# Patient Record
Sex: FEMALE | Race: WHITE | NOT HISPANIC OR LATINO | Employment: FULL TIME | URBAN - METROPOLITAN AREA
[De-identification: names, ages, dates, MRNs, and addresses within clinical notes are randomized per-mention and may not be internally consistent; named-entity substitution may affect disease eponyms.]

---

## 2017-01-17 ENCOUNTER — PRENATAL OFFICE VISIT (OUTPATIENT)
Dept: OBGYN | Facility: CLINIC | Age: 32
End: 2017-01-17
Payer: COMMERCIAL

## 2017-01-17 VITALS — WEIGHT: 156.6 LBS | SYSTOLIC BLOOD PRESSURE: 94 MMHG | DIASTOLIC BLOOD PRESSURE: 54 MMHG | BODY MASS INDEX: 26.87 KG/M2

## 2017-01-17 DIAGNOSIS — Z34.02 ENCOUNTER FOR SUPERVISION OF NORMAL FIRST PREGNANCY IN SECOND TRIMESTER: Primary | ICD-10-CM

## 2017-01-17 PROCEDURE — 99207 ZZC PRENATAL VISIT: CPT | Performed by: OBSTETRICS & GYNECOLOGY

## 2017-01-17 NOTE — NURSING NOTE
"Chief Complaint   Patient presents with     Prenatal Care   21w2d  Discuss fetal echo.  Mere Mcallister MA      Initial BP 94/54 mmHg  Wt 156 lb 9.6 oz (71.033 kg)  LMP 08/09/2016 Estimated body mass index is 26.87 kg/(m^2) as calculated from the following:    Height as of 9/28/16: 5' 4\" (1.626 m).    Weight as of this encounter: 156 lb 9.6 oz (71.033 kg).  BP completed using cuff size: regular    "

## 2017-01-17 NOTE — PROGRESS NOTES
PROBLEM LIST  1. Migraines: Inderal  2. Anxiety: Zoloft and Buspar  3. Opos/RI/  GIRL  4. Increased NT on 1TM screen: Verifi 46 XX, level II: within normal limits; echo (1/31):    Feeling well overall, flying to [place] in the next few weeks, discussed walking, hydration. Echo scheduled with peds cardiology/MFM on 1/31, follow up with me in one month.  June Jones MD

## 2017-01-31 ENCOUNTER — HOSPITAL ENCOUNTER (OUTPATIENT)
Dept: ULTRASOUND IMAGING | Facility: CLINIC | Age: 32
Discharge: HOME | End: 2017-01-31
Attending: OBSTETRICS & GYNECOLOGY | Admitting: OBSTETRICS & GYNECOLOGY
Payer: COMMERCIAL

## 2017-01-31 ENCOUNTER — MYC MEDICAL ADVICE (OUTPATIENT)
Dept: OBGYN | Facility: CLINIC | Age: 32
End: 2017-01-31

## 2017-01-31 ENCOUNTER — OFFICE VISIT (OUTPATIENT)
Dept: MATERNAL FETAL MEDICINE | Facility: CLINIC | Age: 32
End: 2017-01-31
Attending: OBSTETRICS & GYNECOLOGY
Payer: COMMERCIAL

## 2017-01-31 ENCOUNTER — HOSPITAL ENCOUNTER (OUTPATIENT)
Dept: ULTRASOUND IMAGING | Facility: CLINIC | Age: 32
End: 2017-01-31
Attending: OBSTETRICS & GYNECOLOGY
Payer: COMMERCIAL

## 2017-01-31 DIAGNOSIS — O28.3 INCREASED NUCHAL TRANSLUCENCY SPACE ON FETAL ULTRASOUND: Primary | ICD-10-CM

## 2017-01-31 DIAGNOSIS — O28.3 INCREASED NUCHAL TRANSLUCENCY SPACE ON FETAL ULTRASOUND: ICD-10-CM

## 2017-01-31 PROCEDURE — 76825 ECHO EXAM OF FETAL HEART: CPT

## 2017-01-31 PROCEDURE — 76816 OB US FOLLOW-UP PER FETUS: CPT

## 2017-01-31 NOTE — PROGRESS NOTES
"Please see \"Imaging\" tab under \"Chart Review\" for details of today's ultrasound.    Karl Hernández M.D.  Specialist in Maternal-Fetal Medicine     "

## 2017-02-21 ENCOUNTER — PRENATAL OFFICE VISIT (OUTPATIENT)
Dept: OBGYN | Facility: CLINIC | Age: 32
End: 2017-02-21
Payer: COMMERCIAL

## 2017-02-21 VITALS — SYSTOLIC BLOOD PRESSURE: 98 MMHG | BODY MASS INDEX: 28.37 KG/M2 | WEIGHT: 165.3 LBS | DIASTOLIC BLOOD PRESSURE: 60 MMHG

## 2017-02-21 DIAGNOSIS — Z32.01 PREGNANCY TEST POSITIVE: Primary | ICD-10-CM

## 2017-02-21 DIAGNOSIS — Z34.02 ENCOUNTER FOR SUPERVISION OF NORMAL FIRST PREGNANCY IN SECOND TRIMESTER: ICD-10-CM

## 2017-02-21 LAB
ERYTHROCYTE [DISTWIDTH] IN BLOOD BY AUTOMATED COUNT: 12.3 % (ref 10–15)
HCT VFR BLD AUTO: 30.7 % (ref 35–47)
HGB BLD-MCNC: 10.1 G/DL (ref 11.7–15.7)
MCH RBC QN AUTO: 31.8 PG (ref 26.5–33)
MCHC RBC AUTO-ENTMCNC: 32.9 G/DL (ref 31.5–36.5)
MCV RBC AUTO: 97 FL (ref 78–100)
PLATELET # BLD AUTO: 174 10E9/L (ref 150–450)
RBC # BLD AUTO: 3.18 10E12/L (ref 3.8–5.2)
WBC # BLD AUTO: 8 10E9/L (ref 4–11)

## 2017-02-21 PROCEDURE — 82950 GLUCOSE TEST: CPT | Performed by: OBSTETRICS & GYNECOLOGY

## 2017-02-21 PROCEDURE — 36415 COLL VENOUS BLD VENIPUNCTURE: CPT | Performed by: OBSTETRICS & GYNECOLOGY

## 2017-02-21 PROCEDURE — 86780 TREPONEMA PALLIDUM: CPT | Performed by: OBSTETRICS & GYNECOLOGY

## 2017-02-21 PROCEDURE — 99207 ZZC PRENATAL VISIT: CPT | Performed by: OBSTETRICS & GYNECOLOGY

## 2017-02-21 PROCEDURE — 85027 COMPLETE CBC AUTOMATED: CPT | Performed by: OBSTETRICS & GYNECOLOGY

## 2017-02-21 NOTE — PROGRESS NOTES
PROBLEM LIST  1. Migraines: Inderal  2. Anxiety: Zoloft and Buspar  3. Opos/RI/  GIRL  4. Increased NT on 1TM screen: Verifi 46 XX, level II: within normal limits; echo (1/31):    All follow up tests normal (echo.) Routine prenatal care. Glucose tolerance test today, tdap next time.  June Jones MD

## 2017-02-21 NOTE — MR AVS SNAPSHOT
After Visit Summary   2/21/2017    Seema Nielson    MRN: [id number]           Patient Information     Date Of Birth          1985        Visit Information        Provider Department      2/21/2017 3:30 PM June Jones MD [facility]        Today's Diagnoses     Pregnancy test positive    -  1    Encounter for supervision of normal first pregnancy in second trimester           Follow-ups after your visit        Your next 10 appointments already scheduled     Mar 14, 2017  3:00 PM CDT   ESTABL[facility] PRENATAL with June Jones MD   [facility]    [address]  [facility] [id number] [phone]              Who to contact     If you have questions or need follow up information about today's clinic visit or your schedule please contact [facility] directly at [phone].  Normal or non-critical lab and imaging results will be communicated to you by MyChart, letter or phone within 4 business days after the clinic has received the results. If you do not hear from us within 7 days, please contact the clinic through MyChart or phone. If you have a critical or abnormal lab result, we will notify you by phone as soon as possible.  Submit refill requests through [employer] or call your pharmacy and they will forward the refill request to us. Please allow 3 business days for your refill to be completed.          Additional Information About Your Visit        MyChart Information     [employer] gives you secure access to your electronic health record. If you see a primary care provider, you can also send messages to your care team and make appointments. If you have questions, please call your primary care clinic.  If you do not have a primary care provider, please call [phone] and they will assist you.        Care EveryWhere ID     This is your Care EveryWhere ID. This could be used by other organizations to  access your [facility] medical records  [id number]        Your Vitals Were     Last Period BMI (Body Mass Index)                08/09/2016 28.37 kg/m2           Blood Pressure from Last 3 Encounters:   02/21/17 98/60   01/17/17 94/54   12/21/16 100/62    Weight from Last 3 Encounters:   02/21/17 165 lb 4.8 oz (75 kg)   01/17/17 156 lb 9.6 oz (71 kg)   12/21/16 152 lb 4.8 oz (69.1 kg)              We Performed the Following     Anti Treponema     CBC with platelets     Glucose tolerance, gest screen, 1 hour          Today's Medication Changes          These changes are accurate as of: 2/21/17  4:30 PM.  If you have any questions, ask your nurse or doctor.               Stop taking these medicines if you haven't already. Please contact your care team if you have questions.     SUMAtriptan 100 MG tablet   Commonly known as:  IMITREX   Stopped by:  June Jones MD                    Primary Care Provider    No Pcp Confirmed       No address on file        Thank you!     Thank you for choosing [facility]  for your care. Our goal is always to provide you with excellent care. Hearing back from our patients is one way we can continue to improve our services. Please take a few minutes to complete the written survey that you may receive in the mail after your visit with us. Thank you!             Your Updated Medication List - Protect others around you: Learn how to safely use, store and throw away your medicines at www.disposemymeds.org.          This list is accurate as of: 2/21/17  4:30 PM.  Always use your most recent med list.                   Brand Name Dispense Instructions for use    busPIRone 15 MG tablet    BUSPAR    360 tablet    Take 2 tablets (30 mg) by mouth 2 times daily       propranolol 60 MG 24 hr capsule    INDERAL LA         sertraline 100 MG tablet    ZOLOFT    90 tablet    Take 1 tablet (100 mg) by mouth daily

## 2017-02-21 NOTE — NURSING NOTE
"Chief Complaint   Patient presents with     Prenatal Care   26w2d  Pt having 1 hour glucose, pt is O+.  No concerns.  Mere Mcallister MA      Initial BP 98/60  Wt 165 lb 4.8 oz (75 kg)  LMP 08/09/2016  BMI 28.37 kg/m2 Estimated body mass index is 28.37 kg/(m^2) as calculated from the following:    Height as of 9/28/16: 5' 4\" (1.626 m).    Weight as of this encounter: 165 lb 4.8 oz (75 kg).  Medication Reconciliation: complete    "

## 2017-02-22 LAB
GLUCOSE 1H P 50 G GLC PO SERPL-MCNC: 88 MG/DL (ref 60–129)
T PALLIDUM IGG+IGM SER QL: NEGATIVE

## 2017-03-09 ENCOUNTER — TELEPHONE (OUTPATIENT)
Dept: OBGYN | Facility: CLINIC | Age: 32
End: 2017-03-09

## 2017-03-09 NOTE — TELEPHONE ENCOUNTER
Pt calls, she was in [place] and developed nausea, vomiting and diarrhea the last day of her trip. She has been home for 2 days now and symptoms have continued. She is 28w4d. She is having vomiting and diarrhea episodes a couple times a day. Normal fetal movement today, no vaginal bleeding, contractions or signs of dehydration. She has Zofran at home, but this is not helping the nausea.     Symptoms are consistent with gastroenteritis. Recommended stomach rest following vomiting, then teaspoon of clear fluid every 10 minutes for one hour, can increase amount and frequency if tolerated. Advised BRAT diet once tolerating clear fluids and to call back with any changes.

## 2017-03-14 ENCOUNTER — PRENATAL OFFICE VISIT (OUTPATIENT)
Dept: OBGYN | Facility: CLINIC | Age: 32
End: 2017-03-14
Payer: COMMERCIAL

## 2017-03-14 VITALS
SYSTOLIC BLOOD PRESSURE: 104 MMHG | BODY MASS INDEX: 28.2 KG/M2 | WEIGHT: 164.3 LBS | TEMPERATURE: 97.7 F | DIASTOLIC BLOOD PRESSURE: 58 MMHG

## 2017-03-14 DIAGNOSIS — Z34.03 PRENATAL CARE, FIRST PREGNANCY IN THIRD TRIMESTER: Primary | ICD-10-CM

## 2017-03-14 PROCEDURE — 90471 IMMUNIZATION ADMIN: CPT | Performed by: OBSTETRICS & GYNECOLOGY

## 2017-03-14 PROCEDURE — 90715 TDAP VACCINE 7 YRS/> IM: CPT | Performed by: OBSTETRICS & GYNECOLOGY

## 2017-03-14 PROCEDURE — 99207 ZZC PRENATAL VISIT: CPT | Performed by: OBSTETRICS & GYNECOLOGY

## 2017-03-14 RX ORDER — BREAST PUMP
1 EACH MISCELLANEOUS PRN
Qty: 1 EACH | Refills: 0 | Status: SHIPPED | OUTPATIENT
Start: 2017-03-14 | End: 2018-06-20

## 2017-03-14 NOTE — MR AVS SNAPSHOT
After Visit Summary   3/14/2017    Seema Nielson    MRN: [id number]           Patient Information     Date Of Birth          1985        Visit Information        Provider Department      3/14/2017 3:00 PM June Jones MD [facility]        Today's Diagnoses     Prenatal care, first pregnancy in third trimester    -  1       Follow-ups after your visit        Your next 10 appointments already scheduled     Mar 31, 2017  9:45 AM CDT   ESTABLISHED PRENATAL with June Jones MD   [facility] ([facility]    [address] [place] [id number] [phone]              Who to contact     If you have questions or need follow up information about today's clinic visit or your schedule please contact [facility] directly at [phone].  Normal or non-critical lab and imaging results will be communicated to you by MyChart, letter or phone within 4 business days after the clinic has received the results. If you do not hear from us within 7 days, please contact the clinic through MyChart or phone. If you have a critical or abnormal lab result, we will notify you by phone as soon as possible.  Submit refill requests through [employer] or call your pharmacy and they will forward the refill request to us. Please allow 3 business days for your refill to be completed.          Additional Information About Your Visit        MyChart Information     [employer] gives you secure access to your electronic health record. If you see a primary care provider, you can also send messages to your care team and make appointments. If you have questions, please call your primary care clinic.  If you do not have a primary care provider, please call [phone] and they will assist you.        Care EveryWhere ID     This is your Care EveryWhere ID. This could be used by other organizations to access your [facility]  records  [id number]        Your Vitals Were     Temperature Last Period BMI (Body Mass Index)             97.7  F (36.5  C) (Oral) 08/09/2016 28.2 kg/m2          Blood Pressure from Last 3 Encounters:   03/14/17 104/58   02/21/17 98/60   01/17/17 94/54    Weight from Last 3 Encounters:   03/14/17 164 lb 4.8 oz (74.5 kg)   02/21/17 165 lb 4.8 oz (75 kg)   01/17/17 156 lb 9.6 oz (71 kg)              We Performed the Following     TDAP (ADACEL AGES 11-64)          Today's Medication Changes          These changes are accurate as of: 3/14/17  4:32 PM.  If you have any questions, ask your nurse or doctor.               Start taking these medicines.        Dose/Directions    breast pump Misc   Used for:  Prenatal care, first pregnancy in third trimester   Started by:  June Jones MD        Dose:  1 each   1 each as needed   Quantity:  1 each   Refills:  0            Where to get your medicines      Some of these will need a paper prescription and others can be bought over the counter.  Ask your nurse if you have questions.     Bring a paper prescription for each of these medications     breast pump Misc                Primary Care Provider    No Pcp Confirmed       No address on file        Thank you!     Thank you for choosing [facility]  for your care. Our goal is always to provide you with excellent care. Hearing back from our patients is one way we can continue to improve our services. Please take a few minutes to complete the written survey that you may receive in the mail after your visit with us. Thank you!             Your Updated Medication List - Protect others around you: Learn how to safely use, store and throw away your medicines at www.disposemymeds.org.          This list is accurate as of: 3/14/17  4:32 PM.  Always use your most recent med list.                   Brand Name Dispense Instructions for use    breast pump Misc     1 each    1 each as needed       busPIRone 15 MG tablet     BUSPAR    360 tablet    Take 2 tablets (30 mg) by mouth 2 times daily       propranolol 60 MG 24 hr capsule    INDERAL LA         sertraline 100 MG tablet    ZOLOFT    90 tablet    Take 1 tablet (100 mg) by mouth daily

## 2017-03-14 NOTE — NURSING NOTE
"Chief Complaint   Patient presents with     Prenatal Care   29w2d  Pt c/o increased anxiety.  C/O nausea from Fe.  Mere Mcallister MA      Initial /58 (BP Location: Right arm, Patient Position: Chair, Cuff Size: Adult Regular)  Temp 97.7  F (36.5  C) (Oral)  Wt 164 lb 4.8 oz (74.5 kg)  LMP 08/09/2016  BMI 28.2 kg/m2 Estimated body mass index is 28.2 kg/(m^2) as calculated from the following:    Height as of 9/28/16: 5' 4\" (1.626 m).    Weight as of this encounter: 164 lb 4.8 oz (74.5 kg).  Medication Reconciliation: complete    "

## 2017-03-21 ENCOUNTER — MYC MEDICAL ADVICE (OUTPATIENT)
Dept: OBGYN | Facility: CLINIC | Age: 32
End: 2017-03-21

## 2017-03-21 NOTE — TELEPHONE ENCOUNTER
Agree, unisom is fine. We have discussed her anxiety in clinic and I think it's okay for her to add this for now. Thanks.    June Jones MD

## 2017-03-31 ENCOUNTER — PRENATAL OFFICE VISIT (OUTPATIENT)
Dept: OBGYN | Facility: CLINIC | Age: 32
End: 2017-03-31
Payer: COMMERCIAL

## 2017-03-31 VITALS
DIASTOLIC BLOOD PRESSURE: 58 MMHG | BODY MASS INDEX: 28.67 KG/M2 | TEMPERATURE: 98 F | WEIGHT: 167 LBS | HEART RATE: 100 BPM | SYSTOLIC BLOOD PRESSURE: 102 MMHG

## 2017-03-31 DIAGNOSIS — Z34.03 PRENATAL CARE, FIRST PREGNANCY IN THIRD TRIMESTER: Primary | ICD-10-CM

## 2017-03-31 PROCEDURE — 99207 ZZC PRENATAL VISIT: CPT | Performed by: OBSTETRICS & GYNECOLOGY

## 2017-03-31 RX ORDER — SUMATRIPTAN 100 MG/1
100 TABLET, FILM COATED ORAL
COMMUNITY
End: 2018-06-20

## 2017-03-31 NOTE — PROGRESS NOTES
PROBLEM LIST  1. Migraines: Inderal  2. Anxiety: Zoloft and Buspar  3. Opos/RI/  GIRL  4. Increased NT on 1TM screen: Verifi 46 XX, level II: within normal limits; echo (1/31): within normal limits.    Anxiety is improving. She had one migraine starting earlier this month, went to bed and it was okay. Wondering what she should do in the third trimester since I asked her not to use the Imitrex, if she gets another and is not able to handle it. I recommend going to the ED, where she can receive fluids and Reglan if needed. She understands. Follow up 2 weeks.    June Jones MD

## 2017-03-31 NOTE — MR AVS SNAPSHOT
After Visit Summary   3/31/2017    Seema Nielson    MRN: [id number]           Patient Information     Date Of Birth          1985        Visit Information        Provider Department      3/31/2017 9:45 AM June Jones MD [facility]        Today's Diagnoses     Prenatal care, first pregnancy in third trimester    -  1       Follow-ups after your visit        Your next 10 appointments already scheduled     Apr 13, 2017  2:30 PM CDT   ESTABLISHED PRENATAL with June Jones MD   [facility] ([facility])    [address]  [facility] [address]   [phone]            Apr 26, 2017  3:00 PM CDT   ESTABLISHED PRENATAL with June Jnoes MD   [facility] ([facility])    [address]  [facility] [id number] [phone]            May 03, 2017  3:45 PM CDT   ESTABLISHED PRENATAL with June Jones MD   [facility] ([facility])    [address]  [facility] [id number] [phone]            May 10, 2017  3:30 PM CDT   ESTABLISHED PRENATAL with June Jones MD   [facility] ([facility])    [address]  [facility] [address]   [phone]            May 17, 2017  3:45 PM CDT   ESTABLISHED PRENATAL with June Jones MD   [facility] ([facility])    [address]  [facility] [address]   [phone]            May 24, 2017  3:45 PM CDT   ESTABLISHED PRENATAL with June Jones MD   [facility] ([facility])    [address]  [facility] [id number] [phone]              Who to contact     If you have questions or need follow up information about today's clinic visit or your schedule please contact [facility] directly at [phone].  Normal or non-critical lab and  imaging results will be communicated to you by [employer], letter or phone within 4 business days after the clinic has received the results. If you do not hear from us within 7 days, please contact the clinic through [employer] or phone. If you have a critical or abnormal lab result, we will notify you by phone as soon as possible.  Submit refill requests through [employer] or call your pharmacy and they will forward the refill request to us. Please allow 3 business days for your refill to be completed.          Additional Information About Your Visit        [employer] Information     [employer] gives you secure access to your electronic health record. If you see a primary care provider, you can also send messages to your care team and make appointments. If you have questions, please call your primary care clinic.  If you do not have a primary care provider, please call [phone] and they will assist you.        Care EveryWhere ID     This is your Care EveryWhere ID. This could be used by other organizations to access your [facility] medical records  [id number]        Your Vitals Were     Pulse Temperature Last Period BMI (Body Mass Index)          100 98  F (36.7  C) (Oral) 08/09/2016 28.67 kg/m2         Blood Pressure from Last 3 Encounters:   03/31/17 102/58   03/14/17 104/58   02/21/17 98/60    Weight from Last 3 Encounters:   03/31/17 167 lb (75.8 kg)   03/14/17 164 lb 4.8 oz (74.5 kg)   02/21/17 165 lb 4.8 oz (75 kg)              Today, you had the following     No orders found for display       Primary Care Provider    No Pcp Confirmed       No address on file        Thank you!     Thank you for choosing [facility]  for your care. Our goal is always to provide you with excellent care. Hearing back from our patients is one way we can continue to improve our services. Please take a few minutes to complete the written survey that you may receive in the mail after your visit with us. Thank you!              Your Updated Medication List - Protect others around you: Learn how to safely use, store and throw away your medicines at www.disposemymeds.org.          This list is accurate as of: 3/31/17 10:11 AM.  Always use your most recent med list.                   Brand Name Dispense Instructions for use    breast pump Misc     1 each    1 each as needed       busPIRone 15 MG tablet    BUSPAR    360 tablet    Take 2 tablets (30 mg) by mouth 2 times daily       IMITREX 100 MG tablet   Generic drug:  SUMAtriptan      Take 100 mg by mouth at onset of headache for migraine       propranolol 60 MG 24 hr capsule    KAREN SANCHEZ     Reported on 3/31/2017       sertraline 100 MG tablet    ZOLOFT    90 tablet    Take 1 tablet (100 mg) by mouth daily

## 2017-03-31 NOTE — NURSING NOTE
"Chief Complaint   Patient presents with     Prenatal Care     31 weeks 5 days     Anxiety     Medication Question     migraine treatment       Initial /58 (BP Location: Right arm, Patient Position: Chair, Cuff Size: Adult Regular)  Pulse 100  Temp 98  F (36.7  C) (Oral)  Wt 167 lb (75.8 kg)  LMP 08/09/2016  BMI 28.67 kg/m2 Estimated body mass index is 28.67 kg/(m^2) as calculated from the following:    Height as of 9/28/16: 5' 4\" (1.626 m).    Weight as of this encounter: 167 lb (75.8 kg).  Medication Reconciliation: complete     Edna Heath CMA      "

## 2017-04-13 ENCOUNTER — PRENATAL OFFICE VISIT (OUTPATIENT)
Dept: OBGYN | Facility: CLINIC | Age: 32
End: 2017-04-13
Payer: COMMERCIAL

## 2017-04-13 VITALS
TEMPERATURE: 98.1 F | BODY MASS INDEX: 29.37 KG/M2 | WEIGHT: 171.1 LBS | SYSTOLIC BLOOD PRESSURE: 102 MMHG | DIASTOLIC BLOOD PRESSURE: 54 MMHG

## 2017-04-13 DIAGNOSIS — Z34.03 PRENATAL CARE, FIRST PREGNANCY IN THIRD TRIMESTER: Primary | ICD-10-CM

## 2017-04-13 PROCEDURE — 99207 ZZC PRENATAL VISIT: CPT | Performed by: OBSTETRICS & GYNECOLOGY

## 2017-04-13 NOTE — PROGRESS NOTES
PROBLEM LIST  1. Migraines: Inderal  2. Anxiety: Zoloft and Buspar  3. Opos/RI/  GIRL  4. Increased NT on 1TM screen: Verifi 46 XX, level II: within normal limits; echo (1/31): within normal limits.    Anxiety is improving.  No migraines. Overall feeling quite well. Did prenatal classes. Follow up 2 weeks.    June Jones MD

## 2017-04-13 NOTE — MR AVS SNAPSHOT
After Visit Summary   4/13/2017    Seema Nielson    MRN: [id number]           Patient Information     Date Of Birth          1985        Visit Information        Provider Department      4/13/2017 2:30 PM June Jones MD [facility]        Today's Diagnoses     Prenatal care, first pregnancy in third trimester    -  1       Follow-ups after your visit        Your next 10 appointments already scheduled     Apr 26, 2017  3:00 PM CDT   ESTABLISHED PRENATAL with June Jones MD   [facility] ([facility])    [address]  [facility] [address]   [phone]            May 03, 2017  3:45 PM CDT   ESTABLISHED PRENATAL with June Jones MD   [facility] ([facility])    [address]  [facility] [id number] [phone]            May 10, 2017  3:30 PM CDT   ESTABLISHED PRENATAL with June Jones MD   [facility] ([facility])    [address]  [facility] [id number] [phone]            May 17, 2017  3:45 PM CDT   ESTABLISHED PRENATAL with June Jones MD   [facility] ([facility])    [address]  [facility] [id number] [phone]            May 24, 2017  3:45 PM CDT   ESTABLISHED PRENATAL with June Jones MD   [facility] ([facility])    [address]  [facility] [address] [phone]              Who to contact     If you have questions or need follow up information about today's clinic visit or your schedule please contact [facility] directly at [phone].  Normal or non-critical lab and imaging results will be communicated to you by MyChart, letter or phone within 4 business days after the clinic has received the results. If you do not hear from us within 7 days, please contact the clinic through  [employer] or phone. If you have a critical or abnormal lab result, we will notify you by phone as soon as possible.  Submit refill requests through [employer] or call your pharmacy and they will forward the refill request to us. Please allow 3 business days for your refill to be completed.          Additional Information About Your Visit        [employer] Information     [employer] gives you secure access to your electronic health record. If you see a primary care provider, you can also send messages to your care team and make appointments. If you have questions, please call your primary care clinic.  If you do not have a primary care provider, please call [phone] and they will assist you.        Care EveryWhere ID     This is your Care EveryWhere ID. This could be used by other organizations to access your [place] medical records  [id number]        Your Vitals Were     Temperature Last Period BMI (Body Mass Index)             98.1  F (36.7  C) (Oral) 08/09/2016 29.37 kg/m2          Blood Pressure from Last 3 Encounters:   04/13/17 102/54   03/31/17 102/58   03/14/17 104/58    Weight from Last 3 Encounters:   04/13/17 171 lb 1.6 oz (77.6 kg)   03/31/17 167 lb (75.8 kg)   03/14/17 164 lb 4.8 oz (74.5 kg)              Today, you had the following     No orders found for display       Primary Care Provider Office Phone # Fax #    Tay Lopes [phone]       [facility] [address]  [facility] [address]        Thank you!     Thank you for choosing [facility]  for your care. Our goal is always to provide you with excellent care. Hearing back from our patients is one way we can continue to improve our services. Please take a few minutes to complete the written survey that you may receive in the mail after your visit with us. Thank you!             Your Updated Medication List - Protect others around you: Learn how to safely use, store and throw away your medicines  at www.disposemymeds.org.          This list is accurate as of: 4/13/17  2:54 PM.  Always use your most recent med list.                   Brand Name Dispense Instructions for use    breast pump Misc     1 each    1 each as needed       busPIRone 15 MG tablet    BUSPAR    360 tablet    Take 2 tablets (30 mg) by mouth 2 times daily       IMITREX 100 MG tablet   Generic drug:  SUMAtriptan      Take 100 mg by mouth at onset of headache for migraine       propranolol 60 MG 24 hr capsule    KAREN SANCHEZ     Reported on 3/31/2017       sertraline 100 MG tablet    ZOLOFT    90 tablet    Take 1 tablet (100 mg) by mouth daily

## 2017-04-13 NOTE — NURSING NOTE
"Chief Complaint   Patient presents with     Prenatal Care   33w4d  Mere Mcallister MA      Initial /54 (BP Location: Right arm, Patient Position: Chair, Cuff Size: Adult Regular)  Temp 98.1  F (36.7  C) (Oral)  Wt 171 lb 1.6 oz (77.6 kg)  LMP 2016  BMI 29.37 kg/m2 Estimated body mass index is 29.37 kg/(m^2) as calculated from the following:    Height as of 16: 5' 4\" (1.626 m).    Weight as of this encounter: 171 lb 1.6 oz (77.6 kg).  BP completed using cuff size: regular        The following HM Due: NONE        "

## 2017-04-26 ENCOUNTER — PRENATAL OFFICE VISIT (OUTPATIENT)
Dept: OBGYN | Facility: CLINIC | Age: 32
End: 2017-04-26
Payer: COMMERCIAL

## 2017-04-26 VITALS
WEIGHT: 173.7 LBS | TEMPERATURE: 97.9 F | SYSTOLIC BLOOD PRESSURE: 106 MMHG | BODY MASS INDEX: 29.82 KG/M2 | DIASTOLIC BLOOD PRESSURE: 70 MMHG

## 2017-04-26 DIAGNOSIS — Z34.03 FIRST PREGNANCY, THIRD TRIMESTER: Primary | ICD-10-CM

## 2017-04-26 PROBLEM — Z34.00 FIRST PREGNANCY: Status: ACTIVE | Noted: 2017-04-26

## 2017-04-26 PROCEDURE — 99207 ZZC PRENATAL VISIT: CPT | Performed by: OBSTETRICS & GYNECOLOGY

## 2017-04-26 NOTE — MR AVS SNAPSHOT
After Visit Summary   4/26/2017    Seema Nielson    MRN: [id number]           Patient Information     Date Of Birth          1985        Visit Information        Provider Department      4/26/2017 3:00 PM June Jones MD [facility]        Today's Diagnoses     First pregnancy, third trimester    -  1       Follow-ups after your visit        Your next 10 appointments already scheduled     May 03, 2017  3:45 PM CDT   ESTABLISHED PRENATAL with June Jones MD   [facility]    [address]  [facility] [address]   [phone]            May 10, 2017  3:30 PM CDT   ESTABLISHED PRENATAL with June Jones MD   [facility]    [address]  [facility] [address]   [phone]            May 17, 2017  3:45 PM CDT   ESTABLISHED PRENATAL with June Jones MD   [facility]    [address]  [facility] [address]   [phone]            May 24, 2017  3:45 PM CDT   ESTABLISHED PRENATAL with June Jones MD   [facility]    [address]  [facility] [address] [phone]              Who to contact     If you have questions or need follow up information about today's clinic visit or your schedule please contact [facility] directly at [phone].  Normal or non-critical lab and imaging results will be communicated to you by MyChart, letter or phone within 4 business days after the clinic has received the results. If you do not hear from us within 7 days, please contact the clinic through ExactCost[employer] or phone. If you have a critical or abnormal lab result, we will notify you by phone as soon as possible.  Submit refill requests through [employer] or call your pharmacy and they will forward the refill request to us. Please  allow 3 business days for your refill to be completed.          Additional Information About Your Visit        MyChart Information     CastingDBhart gives you secure access to your electronic health record. If you see a primary care provider, you can also send messages to your care team and make appointments. If you have questions, please call your primary care clinic.  If you do not have a primary care provider, please call [phone] and they will assist you.        Care EveryWhere ID     This is your Care EveryWhere ID. This could be used by other organizations to access your [facility] medical records  [id number]        Your Vitals Were     Temperature Last Period BMI (Body Mass Index)             97.9  F (36.6  C) (Oral) 08/09/2016 29.82 kg/m2          Blood Pressure from Last 3 Encounters:   04/26/17 106/70   04/13/17 102/54   03/31/17 102/58    Weight from Last 3 Encounters:   04/26/17 173 lb 11.2 oz (78.8 kg)   04/13/17 171 lb 1.6 oz (77.6 kg)   03/31/17 167 lb (75.8 kg)              We Performed the Following     Group B strep PCR          Today's Medication Changes          These changes are accurate as of: 4/26/17  3:41 PM.  If you have any questions, ask your nurse or doctor.               Stop taking these medicines if you haven't already. Please contact your care team if you have questions.     propranolol 60 MG 24 hr capsule   Commonly known as:  INDERAL LA   Stopped by:  June Jones MD                    Primary Care Provider Office Phone # Fax #    Tay Lopes [phone]       [facility] [address]  [facility] [address]        Thank you!     Thank you for choosing [facility]  for your care. Our goal is always to provide you with excellent care. Hearing back from our patients is one way we can continue to improve our services. Please take a few minutes to complete the written survey that you may receive in the mail after your visit with  us. Thank you!             Your Updated Medication List - Protect others around you: Learn how to safely use, store and throw away your medicines at www.disposemymeds.org.          This list is accurate as of: 4/26/17  3:41 PM.  Always use your most recent med list.                   Brand Name Dispense Instructions for use    breast pump Misc     1 each    1 each as needed       busPIRone 15 MG tablet    BUSPAR    360 tablet    Take 2 tablets (30 mg) by mouth 2 times daily       IMITREX 100 MG tablet   Generic drug:  SUMAtriptan      Take 100 mg by mouth at onset of headache for migraine Reported on 4/26/2017       sertraline 100 MG tablet    ZOLOFT    90 tablet    Take 1 tablet (100 mg) by mouth daily

## 2017-04-26 NOTE — NURSING NOTE
"Chief Complaint   Patient presents with     Prenatal Care       Initial /70 (BP Location: Right arm, Patient Position: Chair, Cuff Size: Adult Regular)  Temp 97.9  F (36.6  C) (Oral)  Wt 173 lb 11.2 oz (78.8 kg)  LMP 08/09/2016  BMI 29.82 kg/m2 Estimated body mass index is 29.82 kg/(m^2) as calculated from the following:    Height as of 9/28/16: 5' 4\" (1.626 m).    Weight as of this encounter: 173 lb 11.2 oz (78.8 kg).  Medication Reconciliation: complete  35w3d    "

## 2017-04-26 NOTE — PROGRESS NOTES
PROBLEM LIST  1. Migraines: Inderal  2. Anxiety: Zoloft and Buspar  3. Opos/RI/  GIRL  4. Increased NT on 1TM screen: Verifi 46 XX, level II: within normal limits; echo (1/31): within normal limits.    Feeling well overall. Group B Strep today. Discussed signs and symptoms of labor today, when to call or come in. Follow up weekly. Planning epidural likely.    June Jones MD

## 2017-05-03 ENCOUNTER — PRENATAL OFFICE VISIT (OUTPATIENT)
Dept: OBGYN | Facility: CLINIC | Age: 32
End: 2017-05-03
Payer: COMMERCIAL

## 2017-05-03 VITALS — BODY MASS INDEX: 29.87 KG/M2 | SYSTOLIC BLOOD PRESSURE: 102 MMHG | DIASTOLIC BLOOD PRESSURE: 70 MMHG | WEIGHT: 174 LBS

## 2017-05-03 DIAGNOSIS — Z34.03 PRENATAL CARE, FIRST PREGNANCY IN THIRD TRIMESTER: Primary | ICD-10-CM

## 2017-05-03 PROCEDURE — 99207 ZZC PRENATAL VISIT: CPT | Performed by: OBSTETRICS & GYNECOLOGY

## 2017-05-03 PROCEDURE — 87653 STREP B DNA AMP PROBE: CPT | Performed by: OBSTETRICS & GYNECOLOGY

## 2017-05-03 RX ORDER — MULTIPLE VITAMINS W/ MINERALS TAB 9MG-400MCG
1 TAB ORAL DAILY
COMMUNITY

## 2017-05-03 NOTE — NURSING NOTE
"Chief Complaint   Patient presents with     Prenatal Care     She has no concerns.   36w3d      Initial LMP 08/09/2016 Estimated body mass index is 29.82 kg/(m^2) as calculated from the following:    Height as of 9/28/16: 5' 4\" (1.626 m).    Weight as of 4/26/17: 173 lb 11.2 oz (78.8 kg).  Medication Reconciliation: complete   Cheryle Sinclair MA      "

## 2017-05-03 NOTE — MR AVS SNAPSHOT
After Visit Summary   5/3/2017    Seema Nielson    MRN: [id number]           Patient Information     Date Of Birth          1985        Visit Information        Provider Department      5/3/2017 3:45 PM June Jones MD [facility]        Today's Diagnoses     Prenatal care, first pregnancy in third trimester    -  1       Follow-ups after your visit        Your next 10 appointments already scheduled     May 10, 2017  3:30 PM CDT   ESTABLISHED PRENATAL with June Jones MD   [facility] ([facility])    [address]  [facility] [id number] [phone]            May 17, 2017  3:45 PM CDT   ESTABLISHED PRENATAL with June Jones MD   [facility] ([facility])    [address]  [facility] [address] [phone]            May 24, 2017  3:45 PM CDT   ESTABLISHED PRENATAL with June Jones MD   [facility] ([facility])    [address]  [facility] [address] [phone]              Who to contact     If you have questions or need follow up information about today's clinic visit or your schedule please contact [facility] directly at [phone].  Normal or non-critical lab and imaging results will be communicated to you by MyChart, letter or phone within 4 business days after the clinic has received the results. If you do not hear from us within 7 days, please contact the clinic through MyChart or phone. If you have a critical or abnormal lab result, we will notify you by phone as soon as possible.  Submit refill requests through [employer] or call your pharmacy and they will forward the refill request to us. Please allow 3 business days for your refill to be completed.          Additional Information About Your Visit        [employer] Information     [employer] gives you secure access to your electronic health record.  If you see a primary care provider, you can also send messages to your care team and make appointments. If you have questions, please call your primary care clinic.  If you do not have a primary care provider, please call [phone] and they will assist you.        Care EveryWhere ID     This is your Care EveryWhere ID. This could be used by other organizations to access your [place] medical records  [id number]        Your Vitals Were     Last Period BMI (Body Mass Index)                08/09/2016 29.87 kg/m2           Blood Pressure from Last 3 Encounters:   05/03/17 102/70   04/26/17 106/70   04/13/17 102/54    Weight from Last 3 Encounters:   05/03/17 174 lb (78.9 kg)   04/26/17 173 lb 11.2 oz (78.8 kg)   04/13/17 171 lb 1.6 oz (77.6 kg)              Today, you had the following     No orders found for display       Primary Care Provider Office Phone # Fax #    Tay Lopes [phone]       [facility] [address]  [facility] [address]        Thank you!     Thank you for choosing [facility]  for your care. Our goal is always to provide you with excellent care. Hearing back from our patients is one way we can continue to improve our services. Please take a few minutes to complete the written survey that you may receive in the mail after your visit with us. Thank you!             Your Updated Medication List - Protect others around you: Learn how to safely use, store and throw away your medicines at www.disposemymeds.org.          This list is accurate as of: 5/3/17  4:20 PM.  Always use your most recent med list.                   Brand Name Dispense Instructions for use    breast pump Misc     1 each    1 each as needed       busPIRone 15 MG tablet    BUSPAR    360 tablet    Take 2 tablets (30 mg) by mouth 2 times daily       DHA PO      Take 2 tablets daily.       IMITREX 100 MG tablet   Generic drug:  SUMAtriptan      Take 100 mg by mouth at onset  of headache for migraine Reported on 5/3/2017       Multi-vitamin Tabs tablet      Take 1 tablet by mouth daily       sertraline 100 MG tablet    ZOLOFT    90 tablet    Take 1 tablet (100 mg) by mouth daily

## 2017-05-03 NOTE — PROGRESS NOTES
PROBLEM LIST  1. Migraines: Inderal  2. Anxiety: Zoloft and Buspar  3. Opos/RI/  GIRL  4. Increased NT on 1TM screen: Verifi 46 XX, level II: within normal limits; echo (1/31): within normal limits.    Her Group B Strep did not get sent last week, so repeated today. Discussed her pubic symphysis pain, she is managing okay. Follow up weekly. Signs and symptoms of labor discussed.  June Jones MD

## 2017-05-05 LAB
GP B STREP DNA SPEC QL NAA+PROBE: NORMAL
SPECIMEN SOURCE: NORMAL

## 2017-05-10 ENCOUNTER — PRENATAL OFFICE VISIT (OUTPATIENT)
Dept: OBGYN | Facility: CLINIC | Age: 32
End: 2017-05-10
Payer: COMMERCIAL

## 2017-05-10 VITALS — BODY MASS INDEX: 30 KG/M2 | SYSTOLIC BLOOD PRESSURE: 108 MMHG | DIASTOLIC BLOOD PRESSURE: 64 MMHG | WEIGHT: 174.8 LBS

## 2017-05-10 DIAGNOSIS — Z34.03 PRENATAL CARE, FIRST PREGNANCY IN THIRD TRIMESTER: Primary | ICD-10-CM

## 2017-05-10 PROCEDURE — 99207 ZZC PRENATAL VISIT: CPT | Performed by: OBSTETRICS & GYNECOLOGY

## 2017-05-10 NOTE — PROGRESS NOTES
PROBLEM LIST  1. Migraines: Inderal  2. Anxiety: Zoloft and Buspar  3. Opos/RI/  GIRL  4. Increased NT on 1TM screen: Verifi 46 XX, level II: within normal limits; echo (1/31): within normal limits.    Doing well, follow up in one week. Signs and symptoms of labor reviewed.  June Jones MD

## 2017-05-10 NOTE — NURSING NOTE
"Chief Complaint   Patient presents with     Prenatal Care   37w3d  Mere Mcallister MA    Initial /64 (BP Location: Right arm, Patient Position: Chair, Cuff Size: Adult Regular)  Wt 174 lb 12.8 oz (79.3 kg)  LMP 2016  BMI 30 kg/m2 Estimated body mass index is 30 kg/(m^2) as calculated from the following:    Height as of 16: 5' 4\" (1.626 m).    Weight as of this encounter: 174 lb 12.8 oz (79.3 kg).  BP completed using cuff size: regular        The following HM Due: NONE          n/a             "

## 2017-05-10 NOTE — MR AVS SNAPSHOT
After Visit Summary   5/10/2017    Seema Nielson    MRN: [id number]           Patient Information     Date Of Birth          1985        Visit Information        Provider Department      5/10/2017 3:30 PM June Jones MD [facility]        Today's Diagnoses     Prenatal care, first pregnancy in third trimester    -  1       Follow-ups after your visit        Your next 10 appointments already scheduled     May 17, 2017  3:45 PM CDT   ESTABLISHED PRENATAL with June Jones MD   [facility] ([facility]    [address]  [facility] [id number] [phone]            May 24, 2017  3:45 PM CDT   ESTABLISHED PRENATAL with June Jones MD   [facility]    [address] [place]  [facility] [id number] [phone]              Who to contact     If you have questions or need follow up information about today's clinic visit or your schedule please contact [facility] directly at [phone].  Normal or non-critical lab and imaging results will be communicated to you by [employer] Skimm[employer], letter or phone within 4 business days after the clinic has received the results. If you do not hear from us within 7 days, please contact the clinic through Fot[employer] or phone. If you have a critical or abnormal lab result, we will notify you by phone as soon as possible.  Submit refill requests through [employer] or call your pharmacy and they will forward the refill request to us. Please allow 3 business days for your refill to be completed.          Additional Information About Your Visit        [employer] Information     Peel gives you secure access to your electronic health record. If you see a primary care provider, you can also send messages to your care team and make appointments. If you have questions, please call your primary care clinic.  If you do not have a primary care provider,  please call [phone] and they will assist you.        Care EveryWhere ID     This is your Care EveryWhere ID. This could be used by other organizations to access your [place] medical records  [id number]        Your Vitals Were     Last Period BMI (Body Mass Index)                08/09/2016 30 kg/m2           Blood Pressure from Last 3 Encounters:   05/10/17 108/64   05/03/17 102/70   04/26/17 106/70    Weight from Last 3 Encounters:   05/10/17 174 lb 12.8 oz (79.3 kg)   05/03/17 174 lb (78.9 kg)   04/26/17 173 lb 11.2 oz (78.8 kg)              Today, you had the following     No orders found for display       Primary Care Provider Office Phone # Fax #    Tay Lopes [phone]       [facility] [address] [facility] [address]  [facility] [address]        Thank you!     Thank you for choosing [facility]  for your care. Our goal is always to provide you with excellent care. Hearing back from our patients is one way we can continue to improve our services. Please take a few minutes to complete the written survey that you may receive in the mail after your visit with us. Thank you!             Your Updated Medication List - Protect others around you: Learn how to safely use, store and throw away your medicines at www.disposemymeds.org.          This list is accurate as of: 5/10/17  4:19 PM.  Always use your most recent med list.                   Brand Name Dispense Instructions for use    breast pump Misc     1 each    1 each as needed       busPIRone 15 MG tablet    BUSPAR    360 tablet    Take 2 tablets (30 mg) by mouth 2 times daily       DHA PO      Take 2 tablets daily.       IMITREX 100 MG tablet   Generic drug:  SUMAtriptan      Take 100 mg by mouth at onset of headache for migraine Reported on 5/3/2017       Multi-vitamin Tabs tablet      Take 1 tablet by mouth daily       sertraline 100 MG tablet    ZOLOFT    90 tablet    Take 1 tablet (100 mg) by mouth daily

## 2017-05-17 ENCOUNTER — PRENATAL OFFICE VISIT (OUTPATIENT)
Dept: OBGYN | Facility: CLINIC | Age: 32
End: 2017-05-17
Payer: COMMERCIAL

## 2017-05-17 VITALS
BODY MASS INDEX: 30.71 KG/M2 | TEMPERATURE: 98.1 F | SYSTOLIC BLOOD PRESSURE: 108 MMHG | WEIGHT: 178.9 LBS | DIASTOLIC BLOOD PRESSURE: 64 MMHG

## 2017-05-17 DIAGNOSIS — Z34.03 PRENATAL CARE, FIRST PREGNANCY IN THIRD TRIMESTER: Primary | ICD-10-CM

## 2017-05-17 PROCEDURE — 99207 ZZC PRENATAL VISIT: CPT | Performed by: OBSTETRICS & GYNECOLOGY

## 2017-05-17 NOTE — NURSING NOTE
"Chief Complaint   Patient presents with     Prenatal Care     No concern.     38w3d    Initial /64 (BP Location: Right arm, Patient Position: Chair, Cuff Size: Adult Regular)  Temp 98.1  F (36.7  C) (Oral)  Wt 178 lb 14.4 oz (81.1 kg)  LMP 08/09/2016  BMI 30.71 kg/m2 Estimated body mass index is 30.71 kg/(m^2) as calculated from the following:    Height as of 9/28/16: 5' 4\" (1.626 m).    Weight as of this encounter: 178 lb 14.4 oz (81.1 kg).  Medication Reconciliation: complete   Cheryle Sinclair MA      "

## 2017-05-17 NOTE — MR AVS SNAPSHOT
After Visit Summary   5/17/2017    Seema Nielson    MRN: [id number]           Patient Information     Date Of Birth          1985        Visit Information        Provider Department      5/17/2017 3:45 PM June Jones MD [facility]        Today's Diagnoses     Prenatal care, first pregnancy in third trimester    -  1       Follow-ups after your visit        Your next 10 appointments already scheduled     May 24, 2017  3:45 PM CDT   ESTABL[facility] PRENATAL with June Jones MD   [facility]    [address]  [facility] [id number] [phone]            May 30, 2017 11:15 AM CDT   ESTABLISHED PRENATAL with June Jones MD   [facility]    [address] [place]  [facility] [address] [phone]              Who to contact     If you have questions or need follow up information about today's clinic visit or your schedule please contact [facility] directly at [phone].  Normal or non-critical lab and imaging results will be communicated to you by Bol[employer] Trevorhart, letter or phone within 4 business days after the clinic has received the results. If you do not hear from us within 7 days, please contact the clinic through Boost[employer] or phone. If you have a critical or abnormal lab result, we will notify you by phone as soon as possible.  Submit refill requests through [employer] or call your pharmacy and they will forward the refill request to us. Please allow 3 business days for your refill to be completed.          Additional Information About Your Visit        [employer] Trevorhart Information     [employer] gives you secure access to your electronic health record. If you see a primary care provider, you can also send messages to your care team and make appointments. If you have questions, please call your primary care clinic.  If you do not have a primary care provider,  please call [phone] and they will assist you.        Care EveryWhere ID     This is your Care EveryWhere ID. This could be used by other organizations to access your [facility] medical records  [id number]        Your Vitals Were     Temperature Last Period BMI (Body Mass Index)             98.1  F (36.7  C) (Oral) 08/09/2016 30.71 kg/m2          Blood Pressure from Last 3 Encounters:   05/17/17 108/64   05/10/17 108/64   05/03/17 102/70    Weight from Last 3 Encounters:   05/17/17 178 lb 14.4 oz (81.1 kg)   05/10/17 174 lb 12.8 oz (79.3 kg)   05/03/17 174 lb (78.9 kg)              Today, you had the following     No orders found for display       Primary Care Provider Office Phone # Fax #    Tay Lopes [phone]       [facility] [address] [place] [address]        Thank you!     Thank you for choosing [facility]  for your care. Our goal is always to provide you with excellent care. Hearing back from our patients is one way we can continue to improve our services. Please take a few minutes to complete the written survey that you may receive in the mail after your visit with us. Thank you!             Your Updated Medication List - Protect others around you: Learn how to safely use, store and throw away your medicines at www.disposemymeds.org.          This list is accurate as of: 5/17/17  4:05 PM.  Always use your most recent med list.                   Brand Name Dispense Instructions for use    breast pump Misc     1 each    1 each as needed       busPIRone 15 MG tablet    BUSPAR    360 tablet    Take 2 tablets (30 mg) by mouth 2 times daily       DHA PO      Take 2 tablets daily.       IMITREX 100 MG tablet   Generic drug:  SUMAtriptan      Take 100 mg by mouth at onset of headache for migraine Reported on 5/17/2017       Multi-vitamin Tabs tablet      Take 1 tablet by mouth daily       sertraline 100 MG tablet    ZOLOFT    90 tablet    Take  1 tablet (100 mg) by mouth daily

## 2017-05-24 ENCOUNTER — PRENATAL OFFICE VISIT (OUTPATIENT)
Dept: OBGYN | Facility: CLINIC | Age: 32
End: 2017-05-24
Payer: COMMERCIAL

## 2017-05-24 VITALS
BODY MASS INDEX: 30.62 KG/M2 | HEART RATE: 80 BPM | WEIGHT: 178.4 LBS | DIASTOLIC BLOOD PRESSURE: 60 MMHG | SYSTOLIC BLOOD PRESSURE: 106 MMHG

## 2017-05-24 DIAGNOSIS — Z34.03 PRENATAL CARE, FIRST PREGNANCY IN THIRD TRIMESTER: Primary | ICD-10-CM

## 2017-05-24 PROCEDURE — 99207 ZZC PRENATAL VISIT: CPT | Performed by: OBSTETRICS & GYNECOLOGY

## 2017-05-24 NOTE — NURSING NOTE
"Chief Complaint   Patient presents with     Prenatal Care   39w3d  C/o increase pressure/ no contractions    Initial /60 (BP Location: Right arm, Patient Position: Chair, Cuff Size: Adult Regular)  Pulse 80  Wt 178 lb 6.4 oz (80.9 kg)  LMP 08/09/2016  BMI 30.62 kg/m2 Estimated body mass index is 30.62 kg/(m^2) as calculated from the following:    Height as of 9/28/16: 5' 4\" (1.626 m).    Weight as of this encounter: 178 lb 6.4 oz (80.9 kg).  Medication Reconciliation: complete    "

## 2017-05-24 NOTE — MR AVS SNAPSHOT
After Visit Summary   5/24/2017    Seema Nielson    MRN: [id number]           Patient Information     Date Of Birth          1985        Visit Information        Provider Department      5/24/2017 3:45 PM June Jones MD [facility]        Today's Diagnoses     Prenatal care, first pregnancy in third trimester    -  1       Follow-ups after your visit        Your next 10 appointments already scheduled     May 30, 2017 11:15 AM CDT   ESTABLISHED PRENATAL with June Jones MD   [facility]    [address]  [facility] [id number] [phone]              Who to contact     If you have questions or need follow up information about today's clinic visit or your schedule please contact [facility] directly at [phone].  Normal or non-critical lab and imaging results will be communicated to you by MyChart, letter or phone within 4 business days after the clinic has received the results. If you do not hear from us within 7 days, please contact the clinic through [employer] or phone. If you have a critical or abnormal lab result, we will notify you by phone as soon as possible.  Submit refill requests through [employer] or call your pharmacy and they will forward the refill request to us. Please allow 3 business days for your refill to be completed.          Additional Information About Your Visit        MyChart Information     [employer] gives you secure access to your electronic health record. If you see a primary care provider, you can also send messages to your care team and make appointments. If you have questions, please call your primary care clinic.  If you do not have a primary care provider, please call [phone] and they will assist you.        Care EveryWhere ID     This is your Care EveryWhere ID. This could be used by other organizations to access your [place] medical records  [id number]         Your Vitals Were     Pulse Last Period BMI (Body Mass Index)             80 08/09/2016 30.62 kg/m2          Blood Pressure from Last 3 Encounters:   05/24/17 106/60   05/17/17 108/64   05/10/17 108/64    Weight from Last 3 Encounters:   05/24/17 178 lb 6.4 oz (80.9 kg)   05/17/17 178 lb 14.4 oz (81.1 kg)   05/10/17 174 lb 12.8 oz (79.3 kg)              Today, you had the following     No orders found for display       Primary Care Provider Office Phone # Fax #    Tay Lopes [phone]       [facility] [address]  [facility] [address]        Thank you!     Thank you for choosing [facility]  for your care. Our goal is always to provide you with excellent care. Hearing back from our patients is one way we can continue to improve our services. Please take a few minutes to complete the written survey that you may receive in the mail after your visit with us. Thank you!             Your Updated Medication List - Protect others around you: Learn how to safely use, store and throw away your medicines at www.disposemymeds.org.          This list is accurate as of: 5/24/17  4:00 PM.  Always use your most recent med list.                   Brand Name Dispense Instructions for use    breast pump Misc     1 each    1 each as needed       busPIRone 15 MG tablet    BUSPAR    360 tablet    Take 2 tablets (30 mg) by mouth 2 times daily       DHA PO      Take 2 tablets daily.       IMITREX 100 MG tablet   Generic drug:  SUMAtriptan      Take 100 mg by mouth at onset of headache for migraine Reported on 5/17/2017       Multi-vitamin Tabs tablet      Take 1 tablet by mouth daily       sertraline 100 MG tablet    ZOLOFT    90 tablet    Take 1 tablet (100 mg) by mouth daily

## 2017-05-24 NOTE — PROGRESS NOTES
PROBLEM LIST  1. Migraines: Inderal  2. Anxiety: Zoloft and Buspar  3. Opos/RI/GBS neg  GIRL  4. Increased NT on 1TM screen: Verifi 46 XX, level II: within normal limits; echo (1/31): within normal limits.    Doing well, follow up in one week. Membranes stripped with her permission. Signs and symptoms of labor reviewed.  June Jones MD

## 2017-05-25 ENCOUNTER — ANESTHESIA (OUTPATIENT)
Dept: OBGYN | Facility: CLINIC | Age: 32
End: 2017-05-25
Payer: COMMERCIAL

## 2017-05-25 ENCOUNTER — TELEPHONE (OUTPATIENT)
Dept: NURSING | Facility: CLINIC | Age: 32
End: 2017-05-25

## 2017-05-25 ENCOUNTER — ANESTHESIA EVENT (OUTPATIENT)
Dept: OBGYN | Facility: CLINIC | Age: 32
End: 2017-05-25
Payer: COMMERCIAL

## 2017-05-25 ENCOUNTER — HOSPITAL ENCOUNTER (INPATIENT)
Facility: CLINIC | Age: 32
LOS: 2 days | Discharge: HOME | End: 2017-05-27
Attending: OBSTETRICS & GYNECOLOGY | Admitting: FAMILY MEDICINE
Payer: COMMERCIAL

## 2017-05-25 PROBLEM — Z36.89 ENCOUNTER FOR TRIAGE IN PREGNANT PATIENT: Status: ACTIVE | Noted: 2017-05-25

## 2017-05-25 LAB
ABO + RH BLD: NORMAL
ABO + RH BLD: NORMAL
SPECIMEN EXP DATE BLD: NORMAL
T PALLIDUM IGG+IGM SER QL: NEGATIVE

## 2017-05-25 PROCEDURE — 25000132 ZZH RX MED GY IP 250 OP 250 PS 637: Performed by: FAMILY MEDICINE

## 2017-05-25 PROCEDURE — 25000125 ZZHC RX 250

## 2017-05-25 PROCEDURE — 88307 TISSUE EXAM BY PATHOLOGIST: CPT | Mod: 26 | Performed by: FAMILY MEDICINE

## 2017-05-25 PROCEDURE — 3E0R3CZ INTRODUCTION OF REGIONAL ANESTHETIC INTO SPINAL CANAL, PERCUTANEOUS APPROACH: ICD-10-PCS | Performed by: ANESTHESIOLOGY

## 2017-05-25 PROCEDURE — 25000128 H RX IP 250 OP 636: Performed by: FAMILY MEDICINE

## 2017-05-25 PROCEDURE — 25000128 H RX IP 250 OP 636: Performed by: OBSTETRICS & GYNECOLOGY

## 2017-05-25 PROCEDURE — 40000671 ZZH STATISTIC ANESTHESIA CASE

## 2017-05-25 PROCEDURE — 25000125 ZZHC RX 250: Performed by: OBSTETRICS & GYNECOLOGY

## 2017-05-25 PROCEDURE — 86780 TREPONEMA PALLIDUM: CPT | Performed by: OBSTETRICS & GYNECOLOGY

## 2017-05-25 PROCEDURE — 25000125 ZZHC RX 250: Performed by: ANESTHESIOLOGY

## 2017-05-25 PROCEDURE — 88307 TISSUE EXAM BY PATHOLOGIST: CPT | Performed by: FAMILY MEDICINE

## 2017-05-25 PROCEDURE — 25000132 ZZH RX MED GY IP 250 OP 250 PS 637: Performed by: OBSTETRICS & GYNECOLOGY

## 2017-05-25 PROCEDURE — 00HU33Z INSERTION OF INFUSION DEVICE INTO SPINAL CANAL, PERCUTANEOUS APPROACH: ICD-10-PCS | Performed by: ANESTHESIOLOGY

## 2017-05-25 PROCEDURE — 37000011 ZZH ANESTHESIA WARD SERVICE

## 2017-05-25 PROCEDURE — 10907ZC DRAINAGE OF AMNIOTIC FLUID, THERAPEUTIC FROM PRODUCTS OF CONCEPTION, VIA NATURAL OR ARTIFICIAL OPENING: ICD-10-PCS | Performed by: FAMILY MEDICINE

## 2017-05-25 PROCEDURE — 86901 BLOOD TYPING SEROLOGIC RH(D): CPT | Performed by: OBSTETRICS & GYNECOLOGY

## 2017-05-25 PROCEDURE — 25000128 H RX IP 250 OP 636

## 2017-05-25 PROCEDURE — 59400 OBSTETRICAL CARE: CPT | Performed by: FAMILY MEDICINE

## 2017-05-25 PROCEDURE — 0UQGXZZ REPAIR VAGINA, EXTERNAL APPROACH: ICD-10-PCS | Performed by: FAMILY MEDICINE

## 2017-05-25 PROCEDURE — 12000029 ZZH R&B OB INTERMEDIATE

## 2017-05-25 PROCEDURE — 0KQM0ZZ REPAIR PERINEUM MUSCLE, OPEN APPROACH: ICD-10-PCS | Performed by: FAMILY MEDICINE

## 2017-05-25 PROCEDURE — 86900 BLOOD TYPING SEROLOGIC ABO: CPT | Performed by: OBSTETRICS & GYNECOLOGY

## 2017-05-25 PROCEDURE — 72200001 ZZH LABOR CARE VAGINAL DELIVERY SINGLE

## 2017-05-25 RX ORDER — SODIUM CHLORIDE, SODIUM LACTATE, POTASSIUM CHLORIDE, CALCIUM CHLORIDE 600; 310; 30; 20 MG/100ML; MG/100ML; MG/100ML; MG/100ML
INJECTION, SOLUTION INTRAVENOUS CONTINUOUS
Status: DISCONTINUED | OUTPATIENT
Start: 2017-05-25 | End: 2017-05-26

## 2017-05-25 RX ORDER — OXYCODONE HYDROCHLORIDE 5 MG/1
5-10 TABLET ORAL
Status: DISCONTINUED | OUTPATIENT
Start: 2017-05-25 | End: 2017-05-27 | Stop reason: HOSPADM

## 2017-05-25 RX ORDER — METOCLOPRAMIDE HYDROCHLORIDE 5 MG/ML
10 INJECTION INTRAMUSCULAR; INTRAVENOUS EVERY 6 HOURS
Status: DISCONTINUED | OUTPATIENT
Start: 2017-05-25 | End: 2017-05-26

## 2017-05-25 RX ORDER — BISACODYL 10 MG
10 SUPPOSITORY, RECTAL RECTAL DAILY PRN
Status: DISCONTINUED | OUTPATIENT
Start: 2017-05-27 | End: 2017-05-27 | Stop reason: HOSPADM

## 2017-05-25 RX ORDER — SERTRALINE HYDROCHLORIDE 100 MG/1
100 TABLET, FILM COATED ORAL DAILY
Status: DISCONTINUED | OUTPATIENT
Start: 2017-05-25 | End: 2017-05-27 | Stop reason: HOSPADM

## 2017-05-25 RX ORDER — HYDROMORPHONE HYDROCHLORIDE 1 MG/ML
.3-.5 INJECTION, SOLUTION INTRAMUSCULAR; INTRAVENOUS; SUBCUTANEOUS EVERY 30 MIN PRN
Status: DISCONTINUED | OUTPATIENT
Start: 2017-05-25 | End: 2017-05-27 | Stop reason: HOSPADM

## 2017-05-25 RX ORDER — LANOLIN 100 %
OINTMENT (GRAM) TOPICAL
Status: DISCONTINUED | OUTPATIENT
Start: 2017-05-25 | End: 2017-05-27 | Stop reason: HOSPADM

## 2017-05-25 RX ORDER — FENTANYL CITRATE 50 UG/ML
50-100 INJECTION, SOLUTION INTRAMUSCULAR; INTRAVENOUS
Status: DISCONTINUED | OUTPATIENT
Start: 2017-05-25 | End: 2017-05-26

## 2017-05-25 RX ORDER — HYDROCORTISONE 2.5 %
CREAM (GRAM) TOPICAL 3 TIMES DAILY PRN
Status: DISCONTINUED | OUTPATIENT
Start: 2017-05-25 | End: 2017-05-27 | Stop reason: HOSPADM

## 2017-05-25 RX ORDER — IBUPROFEN 800 MG/1
800 TABLET, FILM COATED ORAL
Status: COMPLETED | OUTPATIENT
Start: 2017-05-25 | End: 2017-05-25

## 2017-05-25 RX ORDER — OXYTOCIN 10 [USP'U]/ML
10 INJECTION, SOLUTION INTRAMUSCULAR; INTRAVENOUS
Status: DISCONTINUED | OUTPATIENT
Start: 2017-05-25 | End: 2017-05-26

## 2017-05-25 RX ORDER — AMOXICILLIN 250 MG
1-2 CAPSULE ORAL 2 TIMES DAILY
Status: DISCONTINUED | OUTPATIENT
Start: 2017-05-25 | End: 2017-05-27 | Stop reason: HOSPADM

## 2017-05-25 RX ORDER — ONDANSETRON 2 MG/ML
4 INJECTION INTRAMUSCULAR; INTRAVENOUS EVERY 6 HOURS PRN
Status: DISCONTINUED | OUTPATIENT
Start: 2017-05-25 | End: 2017-05-26

## 2017-05-25 RX ORDER — EPHEDRINE SULFATE 50 MG/ML
INJECTION, SOLUTION INTRAMUSCULAR; INTRAVENOUS; SUBCUTANEOUS
Status: COMPLETED
Start: 2017-05-25 | End: 2017-05-25

## 2017-05-25 RX ORDER — OXYTOCIN 10 [USP'U]/ML
10 INJECTION, SOLUTION INTRAMUSCULAR; INTRAVENOUS
Status: DISCONTINUED | OUTPATIENT
Start: 2017-05-25 | End: 2017-05-27 | Stop reason: HOSPADM

## 2017-05-25 RX ORDER — OXYCODONE AND ACETAMINOPHEN 5; 325 MG/1; MG/1
1 TABLET ORAL
Status: COMPLETED | OUTPATIENT
Start: 2017-05-25 | End: 2017-05-27

## 2017-05-25 RX ORDER — OXYTOCIN/0.9 % SODIUM CHLORIDE 30/500 ML
340 PLASTIC BAG, INJECTION (ML) INTRAVENOUS CONTINUOUS PRN
Status: DISCONTINUED | OUTPATIENT
Start: 2017-05-25 | End: 2017-05-27 | Stop reason: HOSPADM

## 2017-05-25 RX ORDER — NALOXONE HYDROCHLORIDE 0.4 MG/ML
.1-.4 INJECTION, SOLUTION INTRAMUSCULAR; INTRAVENOUS; SUBCUTANEOUS
Status: DISCONTINUED | OUTPATIENT
Start: 2017-05-25 | End: 2017-05-26

## 2017-05-25 RX ORDER — SCOLOPAMINE TRANSDERMAL SYSTEM 1 MG/1
1 PATCH, EXTENDED RELEASE TRANSDERMAL ONCE
Status: DISCONTINUED | OUTPATIENT
Start: 2017-05-25 | End: 2017-05-26

## 2017-05-25 RX ORDER — METHYLERGONOVINE MALEATE 0.2 MG/ML
200 INJECTION INTRAVENOUS
Status: DISCONTINUED | OUTPATIENT
Start: 2017-05-25 | End: 2017-05-27 | Stop reason: HOSPADM

## 2017-05-25 RX ORDER — CALCIUM CARBONATE 500 MG/1
500 TABLET, CHEWABLE ORAL DAILY PRN
Status: DISCONTINUED | OUTPATIENT
Start: 2017-05-25 | End: 2017-05-27 | Stop reason: HOSPADM

## 2017-05-25 RX ORDER — IBUPROFEN 400 MG/1
400-800 TABLET, FILM COATED ORAL EVERY 6 HOURS PRN
Status: DISCONTINUED | OUTPATIENT
Start: 2017-05-25 | End: 2017-05-27 | Stop reason: HOSPADM

## 2017-05-25 RX ORDER — ACETAMINOPHEN 325 MG/1
650 TABLET ORAL EVERY 4 HOURS PRN
Status: DISCONTINUED | OUTPATIENT
Start: 2017-05-25 | End: 2017-05-27 | Stop reason: HOSPADM

## 2017-05-25 RX ORDER — MISOPROSTOL 200 UG/1
400 TABLET ORAL
Status: DISCONTINUED | OUTPATIENT
Start: 2017-05-25 | End: 2017-05-27 | Stop reason: HOSPADM

## 2017-05-25 RX ORDER — EPHEDRINE SULFATE 50 MG/ML
5 INJECTION, SOLUTION INTRAMUSCULAR; INTRAVENOUS; SUBCUTANEOUS
Status: DISCONTINUED | OUTPATIENT
Start: 2017-05-25 | End: 2017-05-26

## 2017-05-25 RX ORDER — CARBOPROST TROMETHAMINE 250 UG/ML
250 INJECTION, SOLUTION INTRAMUSCULAR
Status: DISCONTINUED | OUTPATIENT
Start: 2017-05-25 | End: 2017-05-27 | Stop reason: HOSPADM

## 2017-05-25 RX ORDER — OXYTOCIN/0.9 % SODIUM CHLORIDE 30/500 ML
100-340 PLASTIC BAG, INJECTION (ML) INTRAVENOUS CONTINUOUS PRN
Status: COMPLETED | OUTPATIENT
Start: 2017-05-25 | End: 2017-05-25

## 2017-05-25 RX ORDER — FENTANYL CITRATE 50 UG/ML
100 INJECTION, SOLUTION INTRAMUSCULAR; INTRAVENOUS ONCE
Status: COMPLETED | OUTPATIENT
Start: 2017-05-25 | End: 2017-05-25

## 2017-05-25 RX ORDER — NALBUPHINE HYDROCHLORIDE 10 MG/ML
2.5-5 INJECTION, SOLUTION INTRAMUSCULAR; INTRAVENOUS; SUBCUTANEOUS EVERY 6 HOURS PRN
Status: DISCONTINUED | OUTPATIENT
Start: 2017-05-25 | End: 2017-05-26

## 2017-05-25 RX ORDER — ACETAMINOPHEN 325 MG/1
650 TABLET ORAL EVERY 4 HOURS PRN
Status: DISCONTINUED | OUTPATIENT
Start: 2017-05-25 | End: 2017-05-26

## 2017-05-25 RX ORDER — CITRIC ACID/SODIUM CITRATE 334-500MG
30 SOLUTION, ORAL ORAL ONCE
Status: COMPLETED | OUTPATIENT
Start: 2017-05-25 | End: 2017-05-25

## 2017-05-25 RX ORDER — NALOXONE HYDROCHLORIDE 0.4 MG/ML
.1-.4 INJECTION, SOLUTION INTRAMUSCULAR; INTRAVENOUS; SUBCUTANEOUS
Status: DISCONTINUED | OUTPATIENT
Start: 2017-05-25 | End: 2017-05-27 | Stop reason: HOSPADM

## 2017-05-25 RX ORDER — BUSPIRONE HYDROCHLORIDE 15 MG/1
30 TABLET ORAL 2 TIMES DAILY
Status: DISCONTINUED | OUTPATIENT
Start: 2017-05-25 | End: 2017-05-27 | Stop reason: HOSPADM

## 2017-05-25 RX ORDER — OXYTOCIN/0.9 % SODIUM CHLORIDE 30/500 ML
100 PLASTIC BAG, INJECTION (ML) INTRAVENOUS CONTINUOUS
Status: DISCONTINUED | OUTPATIENT
Start: 2017-05-25 | End: 2017-05-27 | Stop reason: HOSPADM

## 2017-05-25 RX ADMIN — SODIUM CHLORIDE, POTASSIUM CHLORIDE, SODIUM LACTATE AND CALCIUM CHLORIDE: 600; 310; 30; 20 INJECTION, SOLUTION INTRAVENOUS at 13:15

## 2017-05-25 RX ADMIN — IBUPROFEN 800 MG: 800 TABLET ORAL at 22:38

## 2017-05-25 RX ADMIN — FENTANYL CITRATE 100 MCG: 50 INJECTION INTRAMUSCULAR; INTRAVENOUS at 07:45

## 2017-05-25 RX ADMIN — FENTANYL CITRATE 50 MCG: 50 INJECTION INTRAMUSCULAR; INTRAVENOUS at 20:37

## 2017-05-25 RX ADMIN — FENTANYL CITRATE 100 MCG: 50 INJECTION INTRAMUSCULAR; INTRAVENOUS at 04:42

## 2017-05-25 RX ADMIN — EPHEDRINE SULFATE 5 MG: 50 INJECTION INTRAMUSCULAR; INTRAVENOUS; SUBCUTANEOUS at 07:38

## 2017-05-25 RX ADMIN — BUSPIRONE HYDROCHLORIDE 30 MG: 15 TABLET ORAL at 22:38

## 2017-05-25 RX ADMIN — CALCIUM CARBONATE (ANTACID) CHEW TAB 500 MG 500 MG: 500 CHEW TAB at 15:25

## 2017-05-25 RX ADMIN — METOCLOPRAMIDE 10 MG: 5 INJECTION, SOLUTION INTRAMUSCULAR; INTRAVENOUS at 19:04

## 2017-05-25 RX ADMIN — ONDANSETRON 4 MG: 2 INJECTION INTRAMUSCULAR; INTRAVENOUS at 04:31

## 2017-05-25 RX ADMIN — FENTANYL CITRATE 100 MCG: 50 INJECTION INTRAMUSCULAR; INTRAVENOUS at 07:24

## 2017-05-25 RX ADMIN — SODIUM CHLORIDE, POTASSIUM CHLORIDE, SODIUM LACTATE AND CALCIUM CHLORIDE 1000 ML: 600; 310; 30; 20 INJECTION, SOLUTION INTRAVENOUS at 04:23

## 2017-05-25 RX ADMIN — CALCIUM CARBONATE (ANTACID) CHEW TAB 500 MG 500 MG: 500 CHEW TAB at 15:52

## 2017-05-25 RX ADMIN — FENTANYL CITRATE 100 MCG: 50 INJECTION INTRAMUSCULAR; INTRAVENOUS at 05:55

## 2017-05-25 RX ADMIN — ONDANSETRON 4 MG: 2 INJECTION INTRAMUSCULAR; INTRAVENOUS at 14:09

## 2017-05-25 RX ADMIN — SERTRALINE HYDROCHLORIDE 100 MG: 100 TABLET ORAL at 09:57

## 2017-05-25 RX ADMIN — ONDANSETRON 4 MG: 2 INJECTION INTRAMUSCULAR; INTRAVENOUS at 18:00

## 2017-05-25 RX ADMIN — BUSPIRONE HYDROCHLORIDE 30 MG: 15 TABLET ORAL at 09:57

## 2017-05-25 RX ADMIN — SODIUM CHLORIDE, POTASSIUM CHLORIDE, SODIUM LACTATE AND CALCIUM CHLORIDE: 600; 310; 30; 20 INJECTION, SOLUTION INTRAVENOUS at 04:52

## 2017-05-25 RX ADMIN — SODIUM CITRATE AND CITRIC ACID MONOHYDRATE 30 ML: 500; 334 SOLUTION ORAL at 17:46

## 2017-05-25 RX ADMIN — EPHEDRINE SULFATE 5 MG: 50 INJECTION, SOLUTION INTRAMUSCULAR; INTRAVENOUS; SUBCUTANEOUS at 07:38

## 2017-05-25 RX ADMIN — FENTANYL CITRATE 100 MCG: 50 INJECTION INTRAMUSCULAR; INTRAVENOUS at 06:55

## 2017-05-25 RX ADMIN — ACETAMINOPHEN 650 MG: 325 TABLET, FILM COATED ORAL at 13:18

## 2017-05-25 RX ADMIN — Medication: at 11:52

## 2017-05-25 RX ADMIN — OXYTOCIN-SODIUM CHLORIDE 0.9% IV SOLN 30 UNIT/500ML 340 ML/HR: 30-0.9/5 SOLUTION at 20:26

## 2017-05-25 RX ADMIN — Medication: at 07:25

## 2017-05-25 NOTE — ADDENDUM NOTE
Addendum  created 05/25/17 1718 by Umer Valentin DO    Anesthesia Event edited, Procedure Event Log accessed

## 2017-05-25 NOTE — PROVIDER NOTIFICATION
05/25/17 0401   Provider Notification   Provider Name/Title Dr. Jenkins   Method of Notification Phone   Request Evaluate - Remote   Notification Reason Patient Arrived;Uterine Activity;Pain;SVE   Comments   Comments Intrapartum orders received. Ok for epidural whenever. Call with changes.

## 2017-05-25 NOTE — ADDENDUM NOTE
Addendum  created 05/25/17 1415 by Umer Valentin DO    Anesthesia Event edited, Procedure Event Log accessed

## 2017-05-25 NOTE — PROVIDER NOTIFICATION
05/25/17 1410   Provider Notification   Provider Name/Title Dr Valentin   Method of Notification At Bedside   Request Evaluate in Person   Notification Reason Pain   Dr. Valentin at bedside to assess patient's pain. 2/3 bolus given. Will continue to monitor.

## 2017-05-25 NOTE — IP AVS SNAPSHOT
[facility]    [address] [facility] [address]    Phone:  [phone]    Fax:  [phone]                                       After Visit Summary   5/25/2017    Seema Nielson    MRN: [id number]           After Visit Summary Signature Page     I have received my discharge instructions, and my questions have been answered. I have discussed any challenges I see with this plan with the nurse or doctor.    ..........................................................................................................................................  Patient/Patient Representative Signature      ..........................................................................................................................................  Patient Representative Print Name and Relationship to Patient    ..................................................               ................................................  Date                                            Time    ..........................................................................................................................................  Reviewed by Signature/Title    ...................................................              ..............................................  Date                                                            Time

## 2017-05-25 NOTE — IP AVS SNAPSHOT
MRN:[id number]                      After Visit Summary   5/25/2017    Seema Nielson    MRN: [id number]           Thank you!     Thank you for choosing [facility] for your care. Our goal is always to provide you with excellent care. Hearing back from our patients is one way we can continue to improve our services. Please take a few minutes to complete the written survey that you may receive in the mail after you visit. If you would like to speak to someone directly about your visit please contact Patient Relations at [phone]. Thank you!          Patient Information     Date Of Birth          1985        About your hospital stay     You were admitted on:  May 25, 2017 You last received care in the:  [facility] Postpartum    You were discharged on:  May 27, 2017       Who to Call     For medical emergencies, please call 911.  For non-urgent questions about your medical care, please call your primary care provider or clinic, [phone]          Attending Provider     Provider Specialty    Duglas Jenkins MD OB/Gyn    [facility], Shira Calles DO OB/Gyn       Primary Care Provider Office Phone # Fax #    Tay Lopes -[phone]       [facility] [address] [place] [address]        Further instructions from your care team       Postpartum Vaginal Delivery Instructions  [facility] lactation: [phone]  Activity       Ask family and friends for help when you need it.    Do not place anything in your vagina for 6 weeks.    You are not restricted on other activities, but take it easy for a few weeks to allow your body to recover from delivery.  You are able to do any activities you feel up to that point.    No driving until you have stopped taking your pain medications (usually two weeks after delivery).     Call your health care provider if you have any of these symptoms:       Increased pain, swelling, redness, or  fluid around your stiches from an episiotomy or perineal tear.    A fever above 100.4 F (38 C) with or without chills when placing a thermometer under your tongue.    You soak a sanitary pad with blood within 1 hour, or you see blood clots larger than a golf ball.    Bleeding that lasts more than 6 weeks.    Vaginal discharge that smells bad.    Severe pain, cramping or tenderness in your lower belly area.    A need to urinate more frequently (use the toilet more often), more urgently (use the toilet very quickly), or it burns when you urinate.    Nausea and vomiting.    Redness, swelling or pain around a vein in your leg.    Problems breastfeeding or a red or painful area on your breast.    Chest pain and cough or are gasping for air.    Problems coping with sadness, anxiety, or depression.  If you have any concerns about hurting yourself or the baby, call your provider immediately.     You have questions or concerns after you return home.     Keep your hands clean:  Always wash your hands before touching your perineal area and stitches.  This helps reduce your risk of infection.  If your hands aren't dirty, you may use an alcohol hand-rub to clean your hands. Keep your nails clean and short.    Follow up in 1-2 weeks   Call [phone] or [phone] for appointment     Call and ask to be seen or talk to a doctor for the following: (You can go to the ob triage button   On answering service line) .     Increased bleeding, fever, general unwell feeling or increased pain.     Dr. Shira Chapin, DO    OB/GYN   [facility]        Pending Results     Date and Time Order Name Status Description    5/25/2017 2141 Placenta Path Order and Indications (PLACENTA) In process             Statement of Approval     Ordered          05/27/17 1038  I have reviewed and agree with all the recommendations and orders detailed in this document.  EFFECTIVE NOW     Approved and electronically  "signed by:  Shira Chapin DO             Admission Information     Date & Time Provider Department Dept. Phone    5/25/2017 Shira Chapin DO [facility] Postpartum [phone]      Your Vitals Were     Blood Pressure Pulse Temperature Respirations Height Weight    111/70 70 97.9  F (36.6  C) (Oral) 16 1.626 m (5' 4\") 80.7 kg (178 lb)    Last Period Pulse Oximetry BMI (Body Mass Index)             08/09/2016 94% 30.55 kg/m2         [email or web address] Information     [employer] gives you secure access to your electronic health record. If you see a primary care provider, you can also send messages to your care team and make appointments. If you have questions, please call your primary care clinic.  If you do not have a primary care provider, please call [phone] and they will assist you.        Care EveryWhere ID     This is your Care EveryWhere ID. This could be used by other organizations to access your [place] medical records  [id number]           Review of your medicines      START taking        Dose / Directions    docusate sodium 100 MG tablet   Commonly known as:  COLACE        Dose:  100 mg   Take 100 mg by mouth daily   Quantity:  60 tablet   Refills:  1       ibuprofen 800 MG tablet   Commonly known as:  ADVIL/MOTRIN        Dose:  800 mg   Take 1 tablet (800 mg) by mouth every 8 hours as needed for moderate pain   Quantity:  90 tablet   Refills:  1       oxyCODONE 5 MG IR tablet   Commonly known as:  ROXICODONE        Dose:  5 mg   Take 1 tablet (5 mg) by mouth every 4 hours as needed for pain maximum 6 tablet(s) per day   Quantity:  18 tablet   Refills:  0         CONTINUE these medicines which have NOT CHANGED        Dose / Directions    breast pump Misc   Used for:  Prenatal care, first pregnancy in third trimester        Dose:  1 each   1 each as needed   Quantity:  1 each   Refills:  0       busPIRone 15 MG tablet   Commonly known as:  BUSPAR   Used for:  Anxiety        Dose:  30 mg   Take " 2 tablets (30 mg) by mouth 2 times daily   Quantity:  360 tablet   Refills:  3       DHA PO        Take 2 tablets daily.   Refills:  0       IMITREX 100 MG tablet   Generic drug:  SUMAtriptan        Dose:  100 mg   Take 100 mg by mouth at onset of headache for migraine Reported on 5/17/2017   Refills:  0       Multi-vitamin Tabs tablet        Dose:  1 tablet   Take 1 tablet by mouth daily   Refills:  0       sertraline 100 MG tablet   Commonly known as:  ZOLOFT   Used for:  Anxiety        Dose:  100 mg   Take 1 tablet (100 mg) by mouth daily   Quantity:  90 tablet   Refills:  3            Where to get your medicines      These medications were sent to [place], [place] - [address] [facility]  [address] [facility] [address]     Phone:  [phone]     docusate sodium 100 MG tablet    ibuprofen 800 MG tablet         Some of these will need a paper prescription and others can be bought over the counter. Ask your nurse if you have questions.     Bring a paper prescription for each of these medications     oxyCODONE 5 MG IR tablet                Protect others around you: Learn how to safely use, store and throw away your medicines at www.disposemymeds.org.             Medication List: This is a list of all your medications and when to take them. Check marks below indicate your daily home schedule. Keep this list as a reference.      Medications           Morning Afternoon Evening Bedtime As Needed    breast pump Misc   1 each as needed                                busPIRone 15 MG tablet   Commonly known as:  BUSPAR   Take 2 tablets (30 mg) by mouth 2 times daily   Last time this was given:  30 mg on 5/27/2017  8:26 AM                                DHA PO   Take 2 tablets daily.                                docusate sodium 100 MG tablet   Commonly known as:  COLACE   Take 100 mg by mouth daily                                ibuprofen 800 MG tablet   Commonly known as:   ADVIL/MOTRIN   Take 1 tablet (800 mg) by mouth every 8 hours as needed for moderate pain   Last time this was given:  800 mg on 5/27/2017  6:06 AM                                IMITREX 100 MG tablet   Take 100 mg by mouth at onset of headache for migraine Reported on 5/17/2017   Generic drug:  SUMAtriptan                                Multi-vitamin Tabs tablet   Take 1 tablet by mouth daily                                oxyCODONE 5 MG IR tablet   Commonly known as:  ROXICODONE   Take 1 tablet (5 mg) by mouth every 4 hours as needed for pain maximum 6 tablet(s) per day   Last time this was given:  5 mg on 5/27/2017 10:56 AM                                sertraline 100 MG tablet   Commonly known as:  ZOLOFT   Take 1 tablet (100 mg) by mouth daily   Last time this was given:  100 mg on 5/27/2017  8:26 AM

## 2017-05-25 NOTE — PROVIDER NOTIFICATION
05/25/17 1545   Provider Notification   Provider Name/Title Valentin   Method of Notification At Bedside   MDA at bedside for rebolus.

## 2017-05-25 NOTE — PROVIDER NOTIFICATION
05/25/17 0700   Provider Notification   Provider Name/Title Dr. Valentin   Method of Notification Phone   Dr. Valentin paged for epidural - will put in orders and come up.

## 2017-05-25 NOTE — PLAN OF CARE
Pt not feeling anxious about feeling numb in her legs and butt. Requested to have epidural infusion turned off. Pt would also like to begin pushing.

## 2017-05-25 NOTE — PROVIDER NOTIFICATION
"   05/25/17 1005   Provider Notification   Provider Name/Title Dr. Valentin   Method of Notification In Department   Request Evaluate - Remote   Notification Reason Status Update   Dr Valentin notified that patient feeling anxious and stated that she \"was having a panic attack\" from not being able to feel her feet and toes. Dr. Valentin advised to turn off patient's epidural medication until she requests it again for pain.  Reassurance provided to patient. Patient agreed with this plan.  "

## 2017-05-25 NOTE — PROVIDER NOTIFICATION
05/25/17 0915   Provider Notification   Provider Name/Title Dr. Chapin   Method of Notification At Bedside   Request Evaluate in Person   Notification Reason SVE;Status Update;Labor Status;Membrane Status   Dr. Chapin assessed and made plan with patient. SVE done; patient dilated 6-7, 100% effaced, -1 station. Plan made to not rupture membranes or start oxytocin at this time. Orders for zoloft and buspar to be given now.

## 2017-05-25 NOTE — PLAN OF CARE
Pt educated re: Pain medication options, ambulation, monitoring, labor process, etc.  Contractions have spaced out - encouraged ambulation and birthing ball; pt states she would like to try natural methods as opposed to pitocin.  Pt does not want epidural at this time, would like to continue using fentanyl.

## 2017-05-25 NOTE — PROVIDER NOTIFICATION
05/25/17 0729   Provider Notification   Provider Name/Title Dr. Jenkins   Method of Notification Phone   Dr. Jenkins updated re: Last SVE before 4am @ 3/90/-1, contractions had spaced out, pt was ambulating/on birthing ball, has received fentanyl, just now received epidural. No new orders, Dr. Chapin will be on today.

## 2017-05-25 NOTE — PROVIDER NOTIFICATION
05/25/17 1510   Provider Notification   Provider Name/Title Dr Chapin   Method of Notification At Bedside   Request Evaluate in Person   Notification Reason Labor Status;SVE;Status Update;Uterine Activity   Dr Chapin at bedside to evaluate patient's labor.

## 2017-05-25 NOTE — ANESTHESIA PREPROCEDURE EVALUATION
PAC NOTE:       ANESTHESIA PRE EVALUATION:  Anesthesia Evaluation       history and physical reviewed .             ROS/MED HX    ENT/Pulmonary:  - neg pulmonary ROS     Neurologic:  - neg neurologic ROS     Cardiovascular:  - neg cardiovascular ROS       METS/Exercise Tolerance:     Hematologic:         Musculoskeletal:         GI/Hepatic:  - neg GI/hepatic ROS       Renal/Genitourinary:         Endo:         Psychiatric:         Infectious Disease:         Malignancy:         Other:                     Physical Exam  Normal systems: cardiovascular, pulmonary and dental    Airway   Mallampati: II    Dental     Cardiovascular       Pulmonary     Other findings: .Lab Test        02/21/17     09/28/16     09/15/16                       1622          1953          0749          WBC          8.0          6.5          4.2           HGB          10.1*        12.2         12.8          MCV          97           92           92            PLT          174          214          186            Lab Test        09/15/16     07/09/16                       0749          0616          NA           137          140           POTASSIUM    4.4          3.4           CHLORIDE     104          105           CO2          26           28            BUN          11           11            CR           0.78         0.85          ANIONGAP     7            7             ORACIO          9.1          9.2           GLC          80           101*            neg OB ROS            Anesthesia Plan      History & Physical Review      ASA Status:  2 .  OB Epidural Asa: 2       Plan for Epidural          Postoperative Care      Consents  Anesthetic plan, risks, benefits and alternatives discussed with:  Patient..                            .

## 2017-05-25 NOTE — PROVIDER NOTIFICATION
05/25/17 1600   Provider Notification   Provider Name/Title Tavares   Method of Notification At Bedside   MD at bedside to check on progress. Gave pt the option to labor down. Pt accepted and would like to rest for a while. Turned to right side.

## 2017-05-25 NOTE — PROVIDER NOTIFICATION
05/25/17 0900   Provider Notification   Provider Name/Title Dr. Ybarra   Method of Notification Phone   Request Evaluate - Remote   Notification Reason Membrane Status;Labor Status;Uterine Activity;Status Update   Dr Reid paged to update on patient status. Dr Reid will stop by to assess patient.

## 2017-05-25 NOTE — PLAN OF CARE
at 39.4 weeks presented at 0200 with low uterine cramp like contractions which started around 2100 last evening. Accompanied by , Vinicius. Denies LOF or VB. [place] and EFM applied. History reviewed and assessment taken. UC's 2-6 min apart and palpate mild to moderate. Patient breathing through UC's and educated on breathing and pain management techniques.  moderate variability with accelerations. Category I. Patient has hx of anxiety and current medications include Buspar and Zoloft. Discussed affects on infant after birth.   SVE: /-2, no fluid seen. No change from last clinic visit. Discussed observation plan, encouraged movement and walking, birthing ball and heat provided. Out walking at this time. Will recheck cervix by 345. Patient agrees to POC.

## 2017-05-25 NOTE — ANESTHESIA PROCEDURE NOTES
Peripheral nerve/Neuraxial procedure note : epidural catheter  Pre-Procedure  Performed by ÁNGELA VALENTIN  Referred by OLEKSANDR ARAGON MD  Location: OB      Pre-Anesthestic Checklist: patient identified, IV checked, risks and benefits discussed, informed consent, monitors and equipment checked, pre-op evaluation and at physician/surgeon's request    Timeout  Correct Patient: Yes   Correct Procedure: Yes   Correct Site: Yes   Correct Laterality: N/A   Correct Position: Yes   Site Marked: N/A   .   Procedure Documentation    .    Procedure:    Epidural catheter.     .  .       Assessment/Narrative  .  .  . Comments:  .Diagnosis- Anticipated vaginal delivery    Continuous Labor Epidural, indication is for labor pain. Following an discussion of the expectations, benefits and risk (including but not limited to nerve damage, infection, bleeding, spinal headache, partial or failed block)--questions were encouraged and answered. The patient appears to understand, and consents to proceed.     The patient received a fluid bolus per orders    Time-out performed.     The patient was in the sitting position, a PVP prep times three was done in the lumbar area followed by placement of a sterile drape. The skin was then infiltrated with lidocaine. A 17ga Touhy needle was then advanced under a loss of resistance technique until the epidural space was identified. The epidural catheter was then advanced and secured. The catheter was then bolused in divided doses with Bupivicaine 0.25% 12 cc plus Fentanyl 100mcg (2cc), while the patient/fetus was monitored. Infusion orders written and infusion of 0.125% bupivicaine 15cc per hour started.    I or my partner, was immediately available and frequently monitored at necessary intervals.      ESTER Valentin

## 2017-05-25 NOTE — TELEPHONE ENCOUNTER
"Call Type: Triage Call    Presenting Problem: \"I am in labor.\" Pt. says that she is 39 weeks 3  days pregnant, with her first baby. ERENDIRA: 5/28/17. Pt. is having  contractions. Pt. sees Dr. June Jones. Dr. Jenkins-[facility]  OB[facility]N-was then paged, to call pt., at: [phone], at: 12:00 A,  via smart web.  Triage Note:  Guideline Title: Pregnancy: Signs of Labor, 37 Weeks or Greater  Recommended Disposition: Call Provider Immediately  Original Inclination: Wanted to speak with a nurse  Override Disposition:  Intended Action: Call PCP/HCP  Physician Contacted: No  First childbirth with regular contractions for 1 hour and contractions are  feeling stronger and closer together. ?  YES  New or worsening signs and symptoms that may indicate shock ? NO  Feeling of baby coming or wanting to push (urge to bear down) ? NO  Umbilical cord or any part of the baby (head, bottom, arm or leg) at the opening  of the vagina ? NO  Gestation 20 to 37 weeks ? NO  Gush or leakage of green or green-tinged or port-wine colored fluid (reddish and  watery) from the vagina ? NO  Decreased fetal movement (less than 10 kicks/movements within two hours or a  significant change in usual pattern compared to previous days) ? NO  No relief between contractions ? NO  Continuous bright red vaginal bleeding for more than 15 minutes (more than  spotting) ? NO  Physician Instructions:  Care Advice: Lie on left side, if possible.  CAUTIONS  SYMPTOM / CONDITION MANAGEMENT  See another provider immediately if unable to talk with your provider  within 1 hour. Follow the directions from your provider's on call resource  if you are unable to speak to your provider directly.  You may be directed  to go to the hospital's Labor & Delivery department for evaluation. Another  adult should drive.  May have clear liquids (such as water, clear fruit juices without pulp,  soda, tea or coffee without dairy or non-dairy creamer, clear broth or  bouillon, oral hydration " solution, or plain gelatin, fruit ices/popsicles,  hard candy) but do not eat solid foods before talking with your provider.  Call  if any of these occur: profuse bright red vaginal bleeding  continuous (without relaxation) abdominal pain  the umbilical cord or any fetal part in vagina  bag of sanford coming through vagina  feeling of wanting to push or have a bowel movement.

## 2017-05-25 NOTE — PROVIDER NOTIFICATION
05/25/17 1325   Provider Notification   Provider Name/Title Dr Chapin   Method of Notification At Bedside   Request Evaluate in Person   Notification Reason SVE;Status Update;Bleeding;Pain;Uterine Activity;Labor Status;Membrane Status   Dr Chapin at bedside to evaluate patient. SVE 9.5/100/-1; fore bag AROM. Patient tolerated well. Will continue to monitor.

## 2017-05-25 NOTE — H&P
[facility] Labor and Delivery History and Physical    Seema Nielson MRN# [id number]   Age: 32 year old YOB: 1985     Date of Admission:  2017    Primary care provider: Tay Lopes           Chief Complaint:   Seema Nielson is a 32 year old female who is  @ 39w4d pregnant and being admitted for active labor management, GBS negative.          Pregnancy history:     OBSTETRIC HISTORY:    Obstetric History       T0      L0     SAB0   TAB0   Ectopic0   Multiple0   Live Births0       # Outcome Date GA Lbr Amari/2nd Weight Sex Delivery Anes PTL Lv   1 Current                   EDC: Estimated Date of Delivery: May 28, 2017    Prenatal Labs:   Lab Results   Component Value Date    ABO O 2017    RH  Pos 2017    AS Neg 2016    HEPBANG Nonreactive 2016    CHPCRT  10/20/2016     Negative   Negative for C. trachomatis rRNA by transcription mediated amplification.   A negative result by transcription mediated amplification does not preclude the   presence of C. trachomatis infection because results are dependent on proper   and adequate collection, absence of inhibitors, and sufficient rRNA to be   detected.      GCPCRT  10/20/2016     Negative   Negative for N. gonorrhoeae rRNA by transcription mediated amplification.   A negative result by transcription mediated amplification does not preclude the   presence of N. gonorrhoeae infection because results are dependent on proper   and adequate collection, absence of inhibitors, and sufficient rRNA to be   detected.      TREPAB Negative 2017    HGB 10.1 (L) 2017       GBS Status:   Lab Results   Component Value Date    GBS  2017     Negative  No GBS DNA detected, presumed negative for GBS or number of bacteria may be   below the limit of detection of the assay.   Assay performed on incubated broth culture of specimen using [employer] real-time   PCR.         Active Problem  List  Patient Active Problem List   Diagnosis     TMJ (dislocation of temporomandibular joint)     Constipation     RHETT (generalized anxiety disorder)     Irregular menses     Migraine without aura and without status migrainosus, not intractable     Anxiety     First pregnancy     Encounter for triage in pregnant patient     Indication for care in labor or delivery       Medication Prior to Admission  Prescriptions Prior to Admission   Medication Sig Dispense Refill Last Dose     multivitamin, therapeutic with minerals (MULTI-VITAMIN) TABS tablet Take 1 tablet by mouth daily   5/24/2017 at Unknown time     Docosahexaenoic Acid (DHA PO) Take 2 tablets daily.   5/24/2017 at Unknown time     sertraline (ZOLOFT) 100 MG tablet Take 1 tablet (100 mg) by mouth daily 90 tablet 3 5/24/2017 at Unknown time     busPIRone (BUSPAR) 15 MG tablet Take 2 tablets (30 mg) by mouth 2 times daily 360 tablet 3 5/24/2017 at Unknown time     SUMAtriptan (IMITREX) 100 MG tablet Take 100 mg by mouth at onset of headache for migraine Reported on 5/17/2017   More than a month at Unknown time     Misc. Devices (BREAST PUMP) MISC 1 each as needed (Patient not taking: Reported on 3/31/2017) 1 each 0 Not Taking   .        Maternal Past Medical History:     Past Medical History:   Diagnosis Date     Abnormal Pap smear of cervix 2004    Treated with Cryo.  Normal paps since then.  Last pap 4/2016     Anxiety state, unspecified     On Zoloft, well controlled. Managed by Dr. Zhu/Marianne     Battery end of life of spinal cord stimulator      Constipation      Constipation      H/O: depression      History of vaccination against human papillomavirus      Migraine, unspecified, without mention of intractable migraine without mention of status migrainosus     Managed by Dr. Zhu/Marianne     OAB (overactive bladder)     interstim initial      Temporomandibular joint disorders, unspecified      TMJ (dislocation of temporomandibular joint) middle school     Has taken Zanaflex.  Has also had surgeries.     Urinary problem                        Family History:   This patient has no significant family history            Social History:   This patient has no significant social history         Review of Systems:   C: NEGATIVE for fever, chills, change in weight  I: NEGATIVE for worrisome rashes, moles or lesions  E: NEGATIVE for vision changes or irritation  E/M: NEGATIVE for ear, mouth and throat problems  R: NEGATIVE for significant cough or SOB  B: NEGATIVE for masses, tenderness or discharge  CV: NEGATIVE for chest pain, palpitations or peripheral edema  GI: NEGATIVE for nausea, abdominal pain, heartburn, or change in bowel habits  : NEGATIVE for frequency, dysuria, or hematuria  M: NEGATIVE for significant arthralgias or myalgia  N: NEGATIVE for weakness, dizziness or paresthesias  E: NEGATIVE for temperature intolerance, skin/hair changes  H: NEGATIVE for bleeding problems  P: NEGATIVE for changes in mood or affect          Physical Exam:     Vitals were reviewed  All vitals stable  Patient Vitals for the past 8 hrs:   BP Temp Temp src Resp SpO2   05/25/17 1005 115/59 - - - 94 %   05/25/17 1000 - - - 16 96 %   05/25/17 0955 - - - - (!) 84 %   05/25/17 0953 111/61 - - - -   05/25/17 0950 - - - - 97 %   05/25/17 0945 - - - - 98 %   05/25/17 0940 - - - - 98 %   05/25/17 0939 115/60 - - - -   05/25/17 0935 - - - - 98 %   05/25/17 0930 - - - 16 97 %   05/25/17 0928 - - - - (!) 84 %   05/25/17 0925 - - - - 95 %   05/25/17 0923 102/63 - - - -   05/25/17 0920 - - - - 98 %   05/25/17 0915 - - - - 97 %   05/25/17 0910 - - - - 95 %   05/25/17 0908 - - - - 93 %   05/25/17 0905 - - - - 96 %   05/25/17 0900 - - - 16 96 %   05/25/17 0855 - - - - 96 %   05/25/17 0850 - - - - 98 %   05/25/17 0845 - - - - 98 %   05/25/17 0840 - - - - 98 %   05/25/17 0837 104/69 - - - -   05/25/17 0835 - - - - 97 %   05/25/17 0832 111/66 - - - -   05/25/17 0830 - - - 16 96 %   05/25/17 0827 108/65 -  - - -   05/25/17 0825 - - - - 98 %   05/25/17 0822 110/68 - - - -   05/25/17 0820 - - - - 100 %   05/25/17 0818 (!) 137/91 - - - -   05/25/17 0815 - - - - 98 %   05/25/17 0812 118/70 - - - -   05/25/17 0810 - - - - 99 %   05/25/17 0807 110/55 - - - -   05/25/17 0805 - - - - 99 %   05/25/17 0802 110/63 - - - -   05/25/17 0800 - - - 16 98 %   05/25/17 0759 110/61 - - - -   05/25/17 0755 - - - - 98 %   05/25/17 0751 96/54 - - - -   05/25/17 0750 98/55 - - - 98 %   05/25/17 0747 95/52 - - - -   05/25/17 0745 96/54 - - - 97 %   05/25/17 0743 102/55 - - - -   05/25/17 0741 104/59 - - - -   05/25/17 0740 - - - - 97 %   05/25/17 0739 96/52 - - - -   05/25/17 0737 95/51 - - - -   05/25/17 0735 94/55 - - - 93 %   05/25/17 0733 93/51 - - - -   05/25/17 0731 97/54 - - - -   05/25/17 0730 100/57 97.6  F (36.4  C) Oral 16 97 %   05/25/17 0729 - - - - 97 %   05/25/17 0727 92/55 - - - -   05/25/17 0725 100/55 - - - -   05/25/17 0723 102/59 - - - -   05/25/17 0555 - 98  F (36.7  C) Oral - -   05/25/17 0445 116/68 - - - -   05/25/17 0424 - - - - 100 %     Constitutional: Awake, alert, cooperative, no apparent distress, and appears stated age.  Eyes: Lids and lashes normal, pupils equal, round and reactive to light, extra ocular muscles intact, sclera clear, conjunctiva normal.  ENT: Normocephalic, without obvious abnormality, atramatic, sinuses nontender on palpation, external ears without lesions, oral pharynx with moist mucus membranes, tonsils without erythema or exudates, gums normal and good dentition.  Neck: Supple, symmetrical, trachea midline, no adenopathy, thyroid symmetric, not enlarged and no tenderness, skin normal.  Hematologic / Lymphatic: No cervical lymphadenopathy and no supraclavicular lymphadenopathy.  Back: Symmetric, no curvature, spinous processes are non-tender on palpation, paraspinous muscles are non-tender on palpation, no costal vertebral tenderness.  Lungs: No increased work of breathing, good air  exchange, clear to auscultation bilaterally, no crackles or wheezing.  Cardiovascular: Regular rate and rhythm, normal S1 and S2, no S3 or S4, and no murmur noted.  Chest / Breast: Breasts symmetrical, skin without lesion(s), no nipple retraction or dimpling, no nipple discharge, no masses palpated, no axillary or supraclavicular adenopathy.  Abdomen: No scars, normal bowel sounds, soft, non-distended, non-tender, no masses palpated, no hepatosplenomegally.  Genitourinary: No urethral discharge, normal external genitalia, no hernia.  Musculoskeletal: No redness, warmth, or swelling of the joints.  Full range of motion noted.  Motor strength is 5 out of 5 all extremities bilaterally.  Tone is normal.  Neurologic: Awake, alert, oriented to name, place and time.  Cranial nerves II-XII are grossly intact.  Motor is 5 out of 5 bilaterally.  Cerebellar finger to nose, heel to shin intact.  Sensory is intact.  Babinski down going, Romberg negative, and gait is normal.  Neuropsychiatric: Normal affect, mood, orientation, memory and insight.  Skin: No rashes, erythema, pallor, petechia or purpura.   Cervix:   Membranes: intact   Dilation: 7   Effacement: 100%   Station:-1   Consistency: average   Position: Mid  Presentation:Cephalic  Fetal Heart Rate Tracing: Tier 1 (normal)  Tocometer: frequency q 2-3 minutes                       Assessment:   Seema Nielson is a 32 year old female who is  @ 39w4d pregnant and being admitted for active labor management, GBS negative, reassuring fetal status.        Plan:   Anticipate   AROM when patient  Desires      Shira Chapin, DO

## 2017-05-26 LAB — HGB BLD-MCNC: 11.1 G/DL (ref 11.7–15.7)

## 2017-05-26 PROCEDURE — 25000132 ZZH RX MED GY IP 250 OP 250 PS 637: Performed by: FAMILY MEDICINE

## 2017-05-26 PROCEDURE — 36415 COLL VENOUS BLD VENIPUNCTURE: CPT | Performed by: FAMILY MEDICINE

## 2017-05-26 PROCEDURE — 85018 HEMOGLOBIN: CPT | Performed by: FAMILY MEDICINE

## 2017-05-26 PROCEDURE — 40000084 ZZH STATISTIC IP LACTATION SERVICES 16-30 MIN

## 2017-05-26 PROCEDURE — 12000029 ZZH R&B OB INTERMEDIATE

## 2017-05-26 RX ADMIN — ACETAMINOPHEN 650 MG: 325 TABLET, FILM COATED ORAL at 23:44

## 2017-05-26 RX ADMIN — IBUPROFEN 800 MG: 400 TABLET ORAL at 17:17

## 2017-05-26 RX ADMIN — OXYCODONE HYDROCHLORIDE 5 MG: 5 TABLET ORAL at 17:20

## 2017-05-26 RX ADMIN — BUSPIRONE HYDROCHLORIDE 30 MG: 15 TABLET ORAL at 21:44

## 2017-05-26 RX ADMIN — IBUPROFEN 800 MG: 400 TABLET ORAL at 11:16

## 2017-05-26 RX ADMIN — ACETAMINOPHEN 650 MG: 325 TABLET, FILM COATED ORAL at 09:50

## 2017-05-26 RX ADMIN — IBUPROFEN 800 MG: 400 TABLET ORAL at 05:32

## 2017-05-26 RX ADMIN — BUSPIRONE HYDROCHLORIDE 30 MG: 15 TABLET ORAL at 08:45

## 2017-05-26 RX ADMIN — ACETAMINOPHEN 650 MG: 325 TABLET, FILM COATED ORAL at 18:43

## 2017-05-26 RX ADMIN — OXYCODONE HYDROCHLORIDE 5 MG: 5 TABLET ORAL at 20:21

## 2017-05-26 RX ADMIN — IBUPROFEN 800 MG: 400 TABLET ORAL at 23:45

## 2017-05-26 RX ADMIN — SENNOSIDES AND DOCUSATE SODIUM 2 TABLET: 8.6; 5 TABLET ORAL at 08:51

## 2017-05-26 RX ADMIN — ACETAMINOPHEN 650 MG: 325 TABLET, FILM COATED ORAL at 14:10

## 2017-05-26 RX ADMIN — SERTRALINE HYDROCHLORIDE 100 MG: 100 TABLET ORAL at 08:45

## 2017-05-26 RX ADMIN — ACETAMINOPHEN 650 MG: 325 TABLET, FILM COATED ORAL at 05:31

## 2017-05-26 RX ADMIN — ACETAMINOPHEN 650 MG: 325 TABLET, FILM COATED ORAL at 00:17

## 2017-05-26 RX ADMIN — OXYCODONE HYDROCHLORIDE 5 MG: 5 TABLET ORAL at 11:16

## 2017-05-26 RX ADMIN — SENNOSIDES AND DOCUSATE SODIUM 2 TABLET: 8.6; 5 TABLET ORAL at 20:21

## 2017-05-26 NOTE — ANESTHESIA POSTPROCEDURE EVALUATION
Patient: Seema Nielson    * No procedures listed *    Diagnosis:* No pre-op diagnosis entered *  Diagnosis Additional Information: .Intrauterine Pregnancy, Labor      Anesthesia Type:  Epidural    Note:  Anesthesia Post Evaluation    Patient location during evaluation: Bedside       Comments: I or my partner was immediately available for management of this patient during epidural analgesia infusion.   Patient post labor epidural catheter, doing well.  She reports good pain relief with epidural catheter.  She denies ongoing sensorimotor block, headache, fever, chills or other complaints.  All questions answered, understanding voiced.  She will have us contacted for any questions or problems.        Last vitals:  Vitals:    05/25/17 2230 05/25/17 2245 05/26/17 0017   BP: 117/57 115/57 107/54   Pulse:   69   Resp:   18   Temp:   98.5  F (36.9  C)   SpO2:            Electronically Signed By: Bhavik Cardona MD  May 26, 2017  8:58 AM

## 2017-05-26 NOTE — PLAN OF CARE
Problem: Goal Outcome Summary  Goal: Goal Outcome Summary  Outcome: No Change  VSS, pt took a shower this shift, ambulating in the room, pleasant, having her family visiting; cramping pain is managed with pharmacological interventions and T-pump, using ice and tucks for perineal pain; seen by lactation consultant this shift, education is given about effective breast feeding, answered questions.

## 2017-05-26 NOTE — PLAN OF CARE
Problem: Goal Outcome Summary  Goal: Goal Outcome Summary  Outcome: Improving  Data: Vital signs within normal limits. Postpartum checks within normal limits - see flow record. Patient eating and drinking normally. Patient able to empty bladder independently and is up ambulating. Patient had one episode of urinary retention- see other note. Since resolved. No apparent signs of infection. Laceration healing well. Patient performing self cares and is able to care for infant. Baby having blood glucose instability.  Pt has lots of pain with feeding- latch check, alternatives (i.e. Nipple shield offered), but declined at this time. Mom stated less pain after RN adjustment. Once on breast, audible swallows heard and colostrum easily expressed. Mother expresses lots of anxiety and concerns with the blood glucose levels. 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  RN educated the mother and she states she feels slightly less anxious.   Action: Patient medicated during the shift for pain. See MAR. Patient reassessed within 1 hour after each medication and pain was improved - patient stated she was comfortable. Patient education done. See flow record.  Response: Positive attachment behaviors observed with infant. Support person present.   Plan: Anticipate discharge.

## 2017-05-26 NOTE — PROGRESS NOTES
"[facility]   Post-Partum Progress Note          Assessment and Plan:    Assessment:   Post-partum day #1  Normal spontaneous vaginal delivery  L&D complications: None      Doing well.  No excessive bleeding  Pain well-controlled.  Concern about avoiding constipation; has senna-colace available, may add additional agents as needed.   No constipation reported as yet.    Concern about pain management, has oxycodone available if needed      Plan:   Ambulation encouraged  Pain control measures as needed  Anticipate discharge tomorrow           Interval History:   Doing well.  Pain is well-controlled.  No fevers.  No history of foul-smelling vaginal discharge.  Good appetite.  Denies chest pain, shortness of breath, nausea or vomiting.  Vaginal bleeding is similar to a heavy menstrual flow.  Ambulatory.  Breastfeeding well.           Significant Problems:      Patient Active Problem List   Diagnosis     TMJ (dislocation of temporomandibular joint)     Constipation     RHETT (generalized anxiety disorder)     Irregular menses     Migraine without aura and without status migrainosus, not intractable     Anxiety     First pregnancy     Encounter for triage in pregnant patient     Indication for care in labor or delivery     Meconium staining      (spontaneous vaginal delivery)             Review of Systems:    The Review of Systems is negative other than noted in the HPI          Medications:   All medications related to the patient's surgery have been reviewed  Current Facility-Administered Medications   Medication     Medication Instructions: misoprostol (CYTOTEC)- Nurse to discuss ordering with provider, if needed. Ordered via \"OB misoprostol (CYTOTEC) Postpartum Hemorrhage PANEL\"     methylergonovine (METHERGINE) injection 200 mcg     carboprost (HEMABATE) injection 250 mcg     oxyCODONE-acetaminophen (PERCOCET) 5-325 MG per tablet 1 tablet     busPIRone (BUSPAR) tablet 30 mg     sertraline (ZOLOFT) tablet " "100 mg     calcium carbonate (TUMS) chewable tablet 500 mg     oxytocin (PITOCIN) 30 units in 500 mL 0.9% NaCl infusion     ibuprofen (ADVIL/MOTRIN) tablet 400-800 mg     acetaminophen (TYLENOL) tablet 650 mg     naloxone (NARCAN) injection 0.1-0.4 mg     senna-docusate (SENOKOT-S;PERICOLACE) 8.6-50 MG per tablet 1-2 tablet     [START ON 5/27/2017] bisacodyl (DULCOLAX) Suppository 10 mg     [START ON 5/27/2017] sodium phosphate (FLEET ENEMA) 1 enema     hydrocortisone 2.5 % cream     lanolin ointment     lactated ringers BOLUS 1,000 mL     oxytocin (PITOCIN) 30 units in 500 mL 0.9% NaCl infusion     oxytocin (PITOCIN) injection 10 Units     misoprostol (CYTOTEC) tablet 400 mcg     NO Rho (D) immune globulin (RhoGam) needed - mother Rh POSITIVE     measles, mumps and rubella vaccine (MMR) injection 0.5 mL     Tdap (tetanus-diphtheria-acell pertussis) (ADACEL) injection 0.5 mL     oxyCODONE (ROXICODONE) IR tablet 5-10 mg     HYDROmorphone (PF) (DILAUDID) injection 0.3-0.5 mg             Physical Exam:   All vitals stable    Vital signs:  Temp: 98.9  F (37.2  C) Temp src: Oral BP: 108/66 Pulse: 79   Resp: 16       Height: 5' 4\" (162.6 cm) Weight: 178 lb (80.7 kg)  Estimated body mass index is 30.55 kg/(m^2) as calculated from the following:    Height as of this encounter: 5' 4\" (1.626 m).    Weight as of this encounter: 178 lb (80.7 kg).      Uterine fundus is firm, non-tender and at the level of the umbilicus  Extremities: no edema bilaterally          Data:     All laboratory data related to this surgery reviewed  Hemoglobin   Date Value Ref Range Status   05/26/2017 11.1 (L) 11.7 - 15.7 g/dL Final   02/21/2017 10.1 (L) 11.7 - 15.7 g/dL Final     Comment:     Results confirmed by repeat test   09/28/2016 12.2 11.7 - 15.7 g/dL Final   09/15/2016 12.8 11.7 - 15.7 g/dL Final   07/09/2016 13.4 11.7 - 15.7 g/dL Final     No imaging studies have been ordered    Hilary Rosales MD    "

## 2017-05-26 NOTE — L&D DELIVERY NOTE
Delivery Summary    Seema Nielson MRN# [id number]   Age: 32 year old YOB: 1985     ASSESSMENT & PLAN:   32 year old y/o  @ 39w4d with Estimated Date of Delivery: Data Unavailable     Patient was admitted for labor  GBS neg.  SROM @ 1145, AROM of forebag 1320 , LOP with brow presentation  delivery   Fetal weight 7# 7oz   2nd degree tear with bilateral superficial sulcus tears, all repaired with 3-0 monocryl  Complications none               Labor Event Times    Labor onset date:  17 Onset time:   9:15 AM   Dilation complete date:  17 Complete time:   1:51 PM   Start pushing date/time:  2017 1437            Labor Length    1st Stage (hrs):  4 (min):  36   2nd Stage (hrs):  6 (min):  31   3rd Stage (hrs):  0 (min):  3      Labor Events     labor?:  No      Rupture identifier:  Rupture 1   Rupture date/time: 17 1100   Rupture type:  Spontaneous rupture of membranes occuring during spontaneous labor or augmentation   Fluid color:  Meconium   Fluid odor:  Normal      1:1 continuous labor support provided by?:  RN Labor partogram used?:  no         Delivery/Placenta Date and Time    Delivery Date:  17 Delivery Time:   8:22 PM   Placenta Date/Time:  2017  8:25 PM   Oxytocin given at the time of delivery:  after delivery of placenta      Vaginal Counts    Initial count performed by 2 team members:   Two Team Members   Kassy Chapin          Needles Suture [place] Sponges Instruments   Initial counts 2  5    Added to count  2 5    Final counts          Placed during labor Accounted for at the end of labor   No NA   No NA   No NA               Apgars    Living status:  Living    1 Minute 5 Minute 10 Minute 15 Minute 20 Minute   Skin color: 0  1       Heart rate: 2  2       Reflex irritability: 2  2       Muscle tone: 2  2       Respiratory effort: 1  2       Total: 7  9             Cord    Vessels:  3 Vessels Complications:  None   Cord Blood  Disposition:  Lab Gases Sent?:  No         Union Resuscitation    Methods:  None       Care at Delivery:  Called to this vaginal delivery for meconium stained amniotic fluid. Infant was delivered and cried before the cord was cut.  There was delayed cord clamping of ~ 1 minute. First apgar was 7 due to more aeration but after vigorous stimulation and crying lung sounds were clear and equal bilaterally. At ~4 min. Of age infant was brought to the warmer to be dried. Lung sounds improve and she is pink in room-air.  Presentation was forehead and she has a large presenting part there. I explained to the parents that that will go down even by tomorrow morning. Stable female .  Tatiana Malinopf ROSANNE CNP  17 8:38PM   Output in Delivery Room:  Stool       Measurements    Weight:  118.7 oz       Skin to Skin and Feeding Plan    Skin to skin initiation date/time: 17   Skin to skin with:  Mother   Skin to skin end date/time:        Labor Events and Shoulder Dystocia    Fetal Tracing Prior to Delivery:  Category 1   Shoulder dystocia present?:  Neg            Delivery (Maternal) (Provider to Complete) ([id number])    Episiotomy:  None   Perineal lacerations:  2nd Repaired?:  Yes   Vaginal laceration?:  Yes Repaired?:  Yes   Cervical laceration?:  No          Mother's Information  Mother: Seema Nielson #[id number]    Start of Mother's Information     IO Blood Loss  17 0915 - 17    Mom's I/O Activity            End of Mother's Information  Mother: Seema Nielson #[id number]            Delivery - Provider to Complete ([id number])    Delivery Type (Choose the 1 that will go to the Birth History):  Vaginal, Spontaneous Delivery                     Other personnel:   Provider Role   MERE HOFFMANN             Placenta    Delayed Cord Clamping:  Done   Date/Time:  2017  8:25 PM   Removal:  Spontaneous   Disposition:  Pathology      Anesthesia    Method:  Epidural   Cervical  dilation at placement:  4-7         Presentation and Position    Presentation:  Brow   Position:  Left Occiput Posterior                    Shira Chapin DO

## 2017-05-26 NOTE — LACTATION NOTE
Lactation visit. Mom has baby is laid back position with baby on top trying to awaken for latch. Her nipples are a little large in diameter but they are pliable and with help baby easily latched and NW. Baby  was given donor milk early for wt issues at 12 hours of age. Discussed this with mom. Assisted with feeding. Baby was quite jittery and though nursing well still was irritable. Assisted with hand expression technique and mom did well and saw good amount of colostrum which she was able to collect and give to baby by spoon, about 3 cc. Enc donor milk as long as baby was jittery even though the blood sugars have stabilized. If she can get her own milk that was encouraged 1st. Discussed the value of both breast compression while baby sucks and hand expression after nursing. Had Mom view the hand expression video and encouraged hand express after each nursing to increase milk supply. Demonstrated the technique and mom returned demo successfully. Reviewed breastfeeding expectations, milk production,  identifying nutritive and non-nutritive sucking, baby's 2nd night. Lactation to follow up tomorrow if available

## 2017-05-26 NOTE — PROVIDER NOTIFICATION
05/25/17 2010   Provider Notification   Provider Name/Title Tavares   Method of Notification At Bedside   MD at bedside for delivery

## 2017-05-26 NOTE — PROVIDER NOTIFICATION
05/25/17 1905   Provider Notification   Provider Name/Title Arya   Method of Notification At Bedside   MDA at bedside for rebcourtney.

## 2017-05-26 NOTE — PROGRESS NOTES
Pt uterus deviated- see flowsheets. Bleeding small. Pt denied need to void. Bladder scanned for >750cc. Pt voided 200cc. Uterus still deviated. Bladder scanned for 550cc. Straight cath performed with GLORY Silva as witness for sterility. 500cc drained from bladder. Fundus WDL- see flowsheets. Pt will attempt to void in 3 hours. Will continue to monitor.

## 2017-05-26 NOTE — ADDENDUM NOTE
Addendum  created 05/25/17 1945 by Matt Koenig MD    Anesthesia Event edited, Procedure Event Log accessed

## 2017-05-27 VITALS
SYSTOLIC BLOOD PRESSURE: 111 MMHG | OXYGEN SATURATION: 94 % | BODY MASS INDEX: 30.39 KG/M2 | DIASTOLIC BLOOD PRESSURE: 70 MMHG | HEIGHT: 64 IN | WEIGHT: 178 LBS | TEMPERATURE: 97.9 F | HEART RATE: 70 BPM | RESPIRATION RATE: 16 BRPM

## 2017-05-27 PROCEDURE — 25000132 ZZH RX MED GY IP 250 OP 250 PS 637: Performed by: OBSTETRICS & GYNECOLOGY

## 2017-05-27 PROCEDURE — 25000132 ZZH RX MED GY IP 250 OP 250 PS 637: Performed by: FAMILY MEDICINE

## 2017-05-27 RX ORDER — IBUPROFEN 800 MG/1
800 TABLET, FILM COATED ORAL EVERY 8 HOURS PRN
Qty: 90 TABLET | Refills: 1 | Status: SHIPPED | OUTPATIENT
Start: 2017-05-27 | End: 2017-07-20

## 2017-05-27 RX ORDER — ASPIRIN 81 MG
100 TABLET, DELAYED RELEASE (ENTERIC COATED) ORAL DAILY
Qty: 60 TABLET | Refills: 1 | Status: SHIPPED | OUTPATIENT
Start: 2017-05-27 | End: 2017-10-04

## 2017-05-27 RX ORDER — OXYCODONE HYDROCHLORIDE 5 MG/1
5 TABLET ORAL EVERY 4 HOURS PRN
Qty: 18 TABLET | Refills: 0 | Status: SHIPPED | OUTPATIENT
Start: 2017-05-27 | End: 2017-07-20

## 2017-05-27 RX ADMIN — ACETAMINOPHEN 650 MG: 325 TABLET, FILM COATED ORAL at 03:32

## 2017-05-27 RX ADMIN — SERTRALINE HYDROCHLORIDE 100 MG: 100 TABLET ORAL at 08:26

## 2017-05-27 RX ADMIN — OXYCODONE HYDROCHLORIDE AND ACETAMINOPHEN 1 TABLET: 5; 325 TABLET ORAL at 14:21

## 2017-05-27 RX ADMIN — SENNOSIDES AND DOCUSATE SODIUM 2 TABLET: 8.6; 5 TABLET ORAL at 08:26

## 2017-05-27 RX ADMIN — BUSPIRONE HYDROCHLORIDE 30 MG: 15 TABLET ORAL at 08:26

## 2017-05-27 RX ADMIN — IBUPROFEN 800 MG: 400 TABLET ORAL at 06:06

## 2017-05-27 RX ADMIN — ACETAMINOPHEN 650 MG: 325 TABLET, FILM COATED ORAL at 10:28

## 2017-05-27 RX ADMIN — OXYCODONE HYDROCHLORIDE 5 MG: 5 TABLET ORAL at 06:56

## 2017-05-27 RX ADMIN — OXYCODONE HYDROCHLORIDE 5 MG: 5 TABLET ORAL at 10:56

## 2017-05-27 NOTE — DISCHARGE INSTRUCTIONS
Postpartum Vaginal Delivery Instructions  [facility] lactation: [phone]  Activity       Ask family and friends for help when you need it.    Do not place anything in your vagina for 6 weeks.    You are not restricted on other activities, but take it easy for a few weeks to allow your body to recover from delivery.  You are able to do any activities you feel up to that point.    No driving until you have stopped taking your pain medications (usually two weeks after delivery).     Call your health care provider if you have any of these symptoms:       Increased pain, swelling, redness, or fluid around your stiches from an episiotomy or perineal tear.    A fever above 100.4 F (38 C) with or without chills when placing a thermometer under your tongue.    You soak a sanitary pad with blood within 1 hour, or you see blood clots larger than a golf ball.    Bleeding that lasts more than 6 weeks.    Vaginal discharge that smells bad.    Severe pain, cramping or tenderness in your lower belly area.    A need to urinate more frequently (use the toilet more often), more urgently (use the toilet very quickly), or it burns when you urinate.    Nausea and vomiting.    Redness, swelling or pain around a vein in your leg.    Problems breastfeeding or a red or painful area on your breast.    Chest pain and cough or are gasping for air.    Problems coping with sadness, anxiety, or depression.  If you have any concerns about hurting yourself or the baby, call your provider immediately.     You have questions or concerns after you return home.     Keep your hands clean:  Always wash your hands before touching your perineal area and stitches.  This helps reduce your risk of infection.  If your hands aren't dirty, you may use an alcohol hand-rub to clean your hands. Keep your nails clean and short.    Follow up in 1-2 weeks   Call [phone] or [phone] for appointment     Call and ask to be seen or talk to a doctor for  the following: (You can go to the ob triage button   On answering service line) .     Increased bleeding, fever, general unwell feeling or increased pain.     Dr. Shira Chapin, DO    OB/GYN   [facility]

## 2017-05-27 NOTE — PLAN OF CARE
Problem: Goal Outcome Summary  Goal: Goal Outcome Summary  Outcome: No Change  Pt meets expected goals, talked about discharge process with pt and spouse earlier this shift, answered questions and follow up for pt, will be discharging to home upon completion of discharge education.       

## 2017-05-27 NOTE — PLAN OF CARE
Problem: Goal Outcome Summary  Goal: Goal Outcome Summary  Outcome: Adequate for Discharge Date Met:  05/27/17  Pt's discharge education is complete, received Rx meds for going to home with, paged SW to see the pt since Hx of depression and anxiety, currently on meds, no one called back, pt scored 2 on the depression assessment, appropriate with self and baby; strongly advised pt to repeat a self assessment test for depression in 1-2 weeks; no further questions, will be discharging to home shortly with her baby.

## 2017-05-27 NOTE — PLAN OF CARE
Problem: Goal Outcome Summary  Goal: Goal Outcome Summary  Outcome: Improving  Motrin, oxy, and tylenol for pain this shift. Nipples tender using mother love and gel pads.Nursing baby well. Lots of visitors this shift.

## 2017-05-27 NOTE — PLAN OF CARE
Problem: Goal Outcome Summary  Goal: Goal Outcome Summary  Outcome: Improving  Pt VSS, is bonding well with infant, tolerating regular diet and able to ambulate independently.  Pt utilizing tylenol and ibuprofen, and oxycodone this shift for pain management. Pt education done, see flow sheet. Expected d/c 5/27/17.

## 2017-05-30 LAB — COPATH REPORT: NORMAL

## 2017-06-09 DIAGNOSIS — B37.31 YEAST INFECTION OF THE VAGINA: ICD-10-CM

## 2017-06-09 RX ORDER — FLUCONAZOLE 150 MG/1
150 TABLET ORAL ONCE
Qty: 2 TABLET | Refills: 0
Start: 2017-06-09 | End: 2017-06-09

## 2017-06-09 NOTE — TELEPHONE ENCOUNTER
Please call prescription to [facility] [phone]. It is not in the system .Mayte Bryant RN  Pt is still having yeast infection symtpotms reoccuring . Tried probiotics and yogurt. Itching and burning . Going out of town Had baby 2 weeks ago . .Mayte Bryant RN

## 2017-06-27 ENCOUNTER — OFFICE VISIT (OUTPATIENT)
Dept: OBGYN | Facility: CLINIC | Age: 32
End: 2017-06-27
Payer: COMMERCIAL

## 2017-06-27 VITALS
DIASTOLIC BLOOD PRESSURE: 60 MMHG | SYSTOLIC BLOOD PRESSURE: 110 MMHG | HEIGHT: 64 IN | WEIGHT: 152 LBS | BODY MASS INDEX: 25.95 KG/M2

## 2017-06-27 DIAGNOSIS — Z13.9 SCREENING FOR CONDITION: ICD-10-CM

## 2017-06-27 DIAGNOSIS — Z13.1 SCREENING FOR DIABETES MELLITUS: ICD-10-CM

## 2017-06-27 DIAGNOSIS — Z13.220 LIPID SCREENING: Primary | ICD-10-CM

## 2017-06-27 LAB
CHOLEST SERPL-MCNC: 188 MG/DL
GLUCOSE SERPL-MCNC: 83 MG/DL (ref 70–99)
HDLC SERPL-MCNC: 62 MG/DL
HGB BLD-MCNC: 13.8 G/DL (ref 11.7–15.7)
LDLC SERPL CALC-MCNC: 116 MG/DL
NONHDLC SERPL-MCNC: 126 MG/DL
TRIGL SERPL-MCNC: 50 MG/DL

## 2017-06-27 PROCEDURE — 80061 LIPID PANEL: CPT | Performed by: OBSTETRICS & GYNECOLOGY

## 2017-06-27 PROCEDURE — 99213 OFFICE O/P EST LOW 20 MIN: CPT | Performed by: OBSTETRICS & GYNECOLOGY

## 2017-06-27 PROCEDURE — 36415 COLL VENOUS BLD VENIPUNCTURE: CPT | Performed by: OBSTETRICS & GYNECOLOGY

## 2017-06-27 PROCEDURE — 82947 ASSAY GLUCOSE BLOOD QUANT: CPT | Performed by: OBSTETRICS & GYNECOLOGY

## 2017-06-27 PROCEDURE — 85018 HEMOGLOBIN: CPT | Performed by: OBSTETRICS & GYNECOLOGY

## 2017-06-27 NOTE — MR AVS SNAPSHOT
After Visit Summary   6/27/2017    Seema Nielson    MRN: [id number]           Patient Information     Date Of Birth          1985        Visit Information        Provider Department      6/27/2017 9:15 AM June Jones MD [facility]        Today's Diagnoses     Lipid screening    -  1    Screening for condition        Screening for diabetes mellitus           Follow-ups after your visit        Your next 10 appointments already scheduled     Jul 11, 2017  2:15 PM CDT   MyChart OB Short with June Jones MD   [facility] ([facility]    [address]  [facility] [address]   [phone]              Who to contact     If you have questions or need follow up information about today's clinic visit or your schedule please contact [facility] directly at [phone].  Normal or non-critical lab and imaging results will be communicated to you by MyChart, letter or phone within 4 business days after the clinic has received the results. If you do not hear from us within 7 days, please contact the clinic through [email or web address] or phone. If you have a critical or abnormal lab result, we will notify you by phone as soon as possible.  Submit refill requests through DialMyApp or call your pharmacy and they will forward the refill request to us. Please allow 3 business days for your refill to be completed.          Additional Information About Your Visit        MyChart Information     DialMyApp gives you secure access to your electronic health record. If you see a primary care provider, you can also send messages to your care team and make appointments. If you have questions, please call your primary care clinic.  If you do not have a primary care provider, please call [phone] and they will assist you.        Care EveryWhere ID     This is your Care EveryWhere ID. This could be used by other organizations to access your [place]  "medical records  [id number]        Your Vitals Were     Height Last Period Breastfeeding? BMI (Body Mass Index)          5' 4\" (1.626 m) 08/09/2016 Yes 26.09 kg/m2         Blood Pressure from Last 3 Encounters:   06/27/17 110/60   05/27/17 111/70   05/24/17 106/60    Weight from Last 3 Encounters:   06/27/17 152 lb (68.9 kg)   05/25/17 178 lb (80.7 kg)   05/24/17 178 lb 6.4 oz (80.9 kg)              We Performed the Following     Glucose     Hemoglobin     Lipid Profile        Primary Care Provider Office Phone # Fax #    Tay Lopes [phone]       [facility] [address]  [facility] [address]        Equal Access to Services     TIFFANIE VICTOR : Hadii aad olman hadasho Soomaali, waaxda luqadaha, qaybta kaalmada adeegyada, waxay carminain haydaniel aguayo . So [facility] [phone].    ATENCIÓN: Si habla español, tiene a spear disposición servicios gratuitos de asistencia lingüística. Evan al [phone].    We comply with applicable federal civil rights laws and [place] laws. We do not discriminate on the basis of race, color, national origin, age, disability sex, sexual orientation or gender identity.            Thank you!     Thank you for choosing [facility]  for your care. Our goal is always to provide you with excellent care. Hearing back from our patients is one way we can continue to improve our services. Please take a few minutes to complete the written survey that you may receive in the mail after your visit with us. Thank you!             Your Updated Medication List - Protect others around you: Learn how to safely use, store and throw away your medicines at www.disposemymeds.org.          This list is accurate as of: 6/27/17  2:18 PM.  Always use your most recent med list.                   Brand Name Dispense Instructions for use Diagnosis    breast pump Misc     1 each    1 each as needed    Prenatal care, first pregnancy in third " trimester       busPIRone 15 MG tablet    BUSPAR    360 tablet    Take 2 tablets (30 mg) by mouth 2 times daily    Anxiety       DHA PO      Take 2 tablets daily.        docusate sodium 100 MG tablet    COLACE    60 tablet    Take 100 mg by mouth daily     (spontaneous vaginal delivery)       ibuprofen 800 MG tablet    ADVIL/MOTRIN    90 tablet    Take 1 tablet (800 mg) by mouth every 8 hours as needed for moderate pain     (spontaneous vaginal delivery)       IMITREX 100 MG tablet   Generic drug:  SUMAtriptan      Take 100 mg by mouth at onset of headache for migraine Reported on 2017        Multi-vitamin Tabs tablet      Take 1 tablet by mouth daily        oxyCODONE 5 MG IR tablet    ROXICODONE    18 tablet    Take 1 tablet (5 mg) by mouth every 4 hours as needed for pain maximum 6 tablet(s) per day     (spontaneous vaginal delivery)       sertraline 100 MG tablet    ZOLOFT    90 tablet    Take 1 tablet (100 mg) by mouth daily    Anxiety

## 2017-06-27 NOTE — LETTER
[facility]  [address], [address]  [place] [address]  [phone]        June 28, 2017    Seema Nielson  [address] [address]            Dear MsSherrell Nielson:      The results of your recent labs were NORMAL.   Results for orders placed or performed in visit on 06/27/17   Lipid Profile   Result Value Ref Range    Cholesterol 188 <200 mg/dL    Triglycerides 50 <150 mg/dL    HDL Cholesterol 62 >49 mg/dL    LDL Cholesterol Calculated 116 (H) <100 mg/dL    Non HDL Cholesterol 126 <130 mg/dL   Hemoglobin   Result Value Ref Range    Hemoglobin 13.8 11.7 - 15.7 g/dL   Glucose   Result Value Ref Range    Glucose 83 70 - 99 mg/dL      If you have any further questions or problems, please contact our office.    Sincerely,      June Jones MD

## 2017-06-27 NOTE — PROGRESS NOTES
"  SUBJECTIVE:                                                   Seema Nielson is a 32 year old female who presents to clinic today for pain at her obstetric laceration repair site. Was doing \"great\" until this weekend, when she noted some pain with wiping and a little bleeding. No bleeding now, just a little sore. She wants to make sure everything is ok. No intercourse yet. Otherwise doing fine, no fever or chills. Baby is doing well.    Problem list and histories reviewed & adjusted, as indicated.  Additional history: as documented.    Patient Active Problem List   Diagnosis     TMJ (dislocation of temporomandibular joint)     Constipation     RHETT (generalized anxiety disorder)     Irregular menses     Migraine without aura and without status migrainosus, not intractable     Anxiety     First pregnancy     Encounter for triage in pregnant patient     Indication for care in labor or delivery     Meconium staining      (spontaneous vaginal delivery)     Past Surgical History:   Procedure Laterality Date     APPENDECTOMY OPEN       MANDIBLE SURGERY  ,     To treat TMJ (Done by oromaxillary sugeon).     NEURAL STIMULATOR DEVICE      Done by urologist in [facility] to treat constipation.     REMOVE STIMULATOR SACRAL NERVE  10/15/2013    Procedure: REMOVE STIMULATOR SACRAL NERVE;   REPLACEMENT NEURO STIMULATOR BATTERY ;  Surgeon: Deacon Casiano MD;  Location: [facility]      Social History   Substance Use Topics     Smoking status: Never Smoker     Smokeless tobacco: Never Used     Alcohol use No      Comment: One drink/week (prior to pregnancy)      Problem (# of Occurrences) Relation (Name,Age of Onset)    Breast Cancer (1) Paternal Grandmother (50)    CEREBROVASCULAR DISEASE (1) Maternal Grandmother (80)    Family History Negative (1) Father: Born     Hyperlipidemia (1) Maternal Grandmother    Hypertension (3) Mother: Born 1953, Paternal Grandmother, Maternal Grandmother    OSTEOPOROSIS " (2) Paternal Grandmother, Paternal Aunt              Current Outpatient Prescriptions on File Prior to Visit:  docusate sodium (COLACE) 100 MG tablet Take 100 mg by mouth daily   multivitamin, therapeutic with minerals (MULTI-VITAMIN) TABS tablet Take 1 tablet by mouth daily   SUMAtriptan (IMITREX) 100 MG tablet Take 100 mg by mouth at onset of headache for migraine Reported on 5/17/2017   sertraline (ZOLOFT) 100 MG tablet Take 1 tablet (100 mg) by mouth daily   busPIRone (BUSPAR) 15 MG tablet Take 2 tablets (30 mg) by mouth 2 times daily   oxyCODONE (ROXICODONE) 5 MG IR tablet Take 1 tablet (5 mg) by mouth every 4 hours as needed for pain maximum 6 tablet(s) per day (Patient not taking: Reported on 6/27/2017)   ibuprofen (ADVIL/MOTRIN) 800 MG tablet Take 1 tablet (800 mg) by mouth every 8 hours as needed for moderate pain (Patient not taking: Reported on 6/27/2017)   Docosahexaenoic Acid (DHA PO) Take 2 tablets daily.   Misc. Devices (BREAST PUMP) MISC 1 each as needed (Patient not taking: Reported on 3/31/2017)     No current facility-administered medications on file prior to visit.   Allergies   Allergen Reactions     Thimerosal      Thorazine [Chlorpromazine]      Gets panic attacks per pt       ROS:  5 point ROS negative except as noted above in HPI, including Gen., Resp., CV, GI &  system review.    OBJECTIVE:     Exam:  Constitutional:  Appearance: Well nourished, well developed alert, in no acute distress  Neurologic/Psychiatric:  Mental Status:  Oriented X3    Pelvis: External genitalia, Bartholin, urethral, and [place] glands within normal limits and with a well-healing midline perineal laceration--there is a single, slightly open area of about 1 mm at the introitus, and my guess is that this was from the knot tied during repair. No breakdown is seen. No signs of infection or fistula.      In-Clinic Test Results:  Results for orders placed or performed in visit on 06/27/17 (from the past 24 hour(s))    Hemoglobin   Result Value Ref Range    Hemoglobin 13.8 11.7 - 15.7 g/dL       ASSESSMENT/PLAN:                                                        ICD-10-CM    1. Lipid screening Z13.220 Lipid Profile   2. Screening for condition Z13.9 Hemoglobin   3. Screening for diabetes mellitus Z13.1 Glucose         She needs fasting labs for insurance so these were drawn today. Has postpartum appointment on 7/11, will follow up then and recheck perineum. Reassured that this is normal. Can use sitz baths, vasoline as needed.    June Jones MD  [facility]

## 2017-07-08 ENCOUNTER — TELEPHONE (OUTPATIENT)
Dept: OBGYN | Facility: CLINIC | Age: 32
End: 2017-07-08

## 2017-07-08 NOTE — TELEPHONE ENCOUNTER
Patient wanted to reschedule her post partum appointment but I didn't see anything until August. Is there any other time in July she could be fit in with Dr. Jones? Let her know.    Thank you,  Mary Jo DICKEY   Central Scheduler

## 2017-07-10 ENCOUNTER — TRANSFERRED RECORDS (OUTPATIENT)
Dept: HEALTH INFORMATION MANAGEMENT | Facility: CLINIC | Age: 32
End: 2017-07-10

## 2017-07-10 NOTE — TELEPHONE ENCOUNTER
Spoke with pt and due to unseen circumstances, she is not able to make it in tomorrow.     Pt states that she is doing better, but knows that Dr. Jones did want to see her to check her perineum.     appt changed to 7/20/17. I did tell the pt that Dr. Jones is on call that day.     ANA Liu RN

## 2017-07-20 ENCOUNTER — PRENATAL OFFICE VISIT (OUTPATIENT)
Dept: OBGYN | Facility: CLINIC | Age: 32
End: 2017-07-20
Payer: COMMERCIAL

## 2017-07-20 VITALS
HEART RATE: 68 BPM | DIASTOLIC BLOOD PRESSURE: 58 MMHG | BODY MASS INDEX: 25.88 KG/M2 | WEIGHT: 151.6 LBS | SYSTOLIC BLOOD PRESSURE: 104 MMHG | HEIGHT: 64 IN

## 2017-07-20 PROCEDURE — 99207 ZZC POST PARTUM EXAM: CPT | Performed by: OBSTETRICS & GYNECOLOGY

## 2017-07-20 RX ORDER — TRETINOIN 0.25 MG/G
CREAM TOPICAL
Refills: 5 | COMMUNITY
Start: 2017-07-10 | End: 2018-10-29

## 2017-07-20 RX ORDER — AZELAIC ACID 0.15 G/G
AEROSOL, FOAM TOPICAL
Refills: 11 | COMMUNITY
Start: 2017-07-12 | End: 2019-02-26

## 2017-07-20 NOTE — NURSING NOTE
"Chief Complaint   Patient presents with     Post Partum Exam       Initial /58  Pulse 68  Ht 5' 4\" (1.626 m)  Wt 151 lb 9.6 oz (68.8 kg)  Breastfeeding? Yes  BMI 26.02 kg/m2 Estimated body mass index is 26.02 kg/(m^2) as calculated from the following:    Height as of this encounter: 5' 4\" (1.626 m).    Weight as of this encounter: 151 lb 9.6 oz (68.8 kg).  BP completed using cuff size: regular        The following HM Due: NONE      The following patient reported/Care Every where data was sent to:  P ABSTRACT QUALITY INITIATIVES [[id number]]  NA     patient has appointment for today    Edwige DELGADO               "

## 2017-07-20 NOTE — MR AVS SNAPSHOT
"              After Visit Summary   7/20/2017    Seema Nielson    MRN: [id number]           Patient Information     Date Of Birth          1985        Visit Information        Provider Department      7/20/2017 10:00 AM June Jones MD [facility]        Today's Diagnoses     Routine postpartum follow-up    -  1       Follow-ups after your visit        Who to contact     If you have questions or need follow up information about today's clinic visit or your schedule please contact [facility] directly at [phone].  Normal or non-critical lab and imaging results will be communicated to you by byUshart, letter or phone within 4 business days after the clinic has received the results. If you do not hear from us within 7 days, please contact the clinic through [email or web address] or phone. If you have a critical or abnormal lab result, we will notify you by phone as soon as possible.  Submit refill requests through Cherry or call your pharmacy and they will forward the refill request to us. Please allow 3 business days for your refill to be completed.          Additional Information About Your Visit        MyChart Information     Cherry gives you secure access to your electronic health record. If you see a primary care provider, you can also send messages to your care team and make appointments. If you have questions, please call your primary care clinic.  If you do not have a primary care provider, please call [phone] and they will assist you.        Care EveryWhere ID     This is your Care EveryWhere ID. This could be used by other organizations to access your [place] medical records  [id number]        Your Vitals Were     Pulse Height Breastfeeding? BMI (Body Mass Index)          68 5' 4\" (1.626 m) Yes 26.02 kg/m2         Blood Pressure from Last 3 Encounters:   07/20/17 104/58   06/27/17 110/60   05/27/17 111/70    Weight from Last 3 Encounters:   07/20/17 151 lb 9.6 oz " (68.8 kg)   06/27/17 152 lb (68.9 kg)   05/25/17 178 lb (80.7 kg)              Today, you had the following     No orders found for display         Today's Medication Changes          These changes are accurate as of: 7/20/17 12:41 PM.  If you have any questions, ask your nurse or doctor.               Stop taking these medicines if you haven't already. Please contact your care team if you have questions.     ibuprofen 800 MG tablet   Commonly known as:  ADVIL/MOTRIN   Stopped by:  June Jones MD           oxyCODONE 5 MG IR tablet   Commonly known as:  ROXICODONE   Stopped by:  June Jones MD                    Primary Care Provider Office Phone # Fax #    Tay Lopes [phone]       [facility] [address]  [facility] [address]        Equal Access to Services     [facility]: Hadii genoveva thurman hadasho Soomaali, waaxda luqadaha, qaybta kaalmada adeegyada, waxay carminain hayaan molina aguayo . So [facility] [phone].    ATENCIÓN: Si habla español, tiene a spear disposición servicios gratuitos de asistencia lingüística. Llame al [phone].    We comply with applicable federal civil rights laws and [place] laws. We do not discriminate on the basis of race, color, national origin, age, disability sex, sexual orientation or gender identity.            Thank you!     Thank you for choosing [facility]  for your care. Our goal is always to provide you with excellent care. Hearing back from our patients is one way we can continue to improve our services. Please take a few minutes to complete the written survey that you may receive in the mail after your visit with us. Thank you!             Your Updated Medication List - Protect others around you: Learn how to safely use, store and throw away your medicines at www.disposemymeds.org.          This list is accurate as of: 7/20/17 12:41 PM.  Always use your most recent med list.                   Brand  Name Dispense Instructions for use Diagnosis    breast pump Misc     1 each    1 each as needed    Prenatal care, first pregnancy in third trimester       busPIRone 15 MG tablet    BUSPAR    360 tablet    Take 2 tablets (30 mg) by mouth 2 times daily    Anxiety       DHA PO      Take 2 tablets daily.        docusate sodium 100 MG tablet    COLACE    60 tablet    Take 100 mg by mouth daily     (spontaneous vaginal delivery)       FINACEA 15 % Foam   Generic drug:  Azelaic Acid      APPLY TO AFFECTED AREAS QD        IMITREX 100 MG tablet   Generic drug:  SUMAtriptan      Take 100 mg by mouth at onset of headache for migraine Reported on 2017        Multi-vitamin Tabs tablet      Take 1 tablet by mouth daily        sertraline 100 MG tablet    ZOLOFT    90 tablet    Take 1 tablet (100 mg) by mouth daily    Anxiety       tretinoin 0.025 % cream    RETIN-A     APPLY A PEA-SIZED AMOUNT TO AFFECTED AREAS QPM

## 2017-07-20 NOTE — PROGRESS NOTES
SUBJECTIVE: Seema is here for a 6-week postpartum checkup.    Delivery date was 2017. She had a  of a viable girl, weight 7 pounds 7 oz., with no complications.  Since delivery, she has been breast feeding.  She has no signs of infection, bleeding or other complications.  She is not pregnant.  We discussed contraception and she has chosen condoms.  She  has not had intercourse since delivery and complains of no discomfort. Patient screened for postpartum depression and complaints are NEGATIVE. Screening has also been completed for intimate partner violence.    PER MD:    She is doing well, reports good mood, is breastfeeding without concerns. No bleeding bowel and bladder function is normal.  She is planning to use condoms for birth control, may decide to start oral contraceptive pills after breastfeeding is completed. We did discuss all options today. Last pap was  and was normal, so not due today.    EXAM:  Today's Depression Rating was   PHQ-9 SCORE 2016   Total Score 0     Gen: mood: happy and adjusting to situation  Abdomen: Benign and Soft, flat, non-tender   Pelvis:  Perineum Intact and Well healed. On bimanual exam, anteverted, smooth contour, without enlargement, mobile, and without tenderness. Adnexa Normal.        ASSESSMENT:   Normal postpartum exam after .    PLAN:  Return as needed or at time of next expected pap, pelvic, or breast exam in one year, or sooner if issues/concerns.    June Jones MD

## 2017-07-24 ENCOUNTER — TRANSFERRED RECORDS (OUTPATIENT)
Dept: HEALTH INFORMATION MANAGEMENT | Facility: CLINIC | Age: 32
End: 2017-07-24

## 2017-08-22 DIAGNOSIS — F41.9 ANXIETY: ICD-10-CM

## 2017-08-22 RX ORDER — BUSPIRONE HYDROCHLORIDE 15 MG/1
TABLET ORAL
Refills: 0 | OUTPATIENT
Start: 2017-08-22

## 2017-08-22 RX ORDER — SERTRALINE HYDROCHLORIDE 100 MG/1
TABLET, FILM COATED ORAL
Qty: 90 TABLET | Refills: 0 | OUTPATIENT
Start: 2017-08-22

## 2017-08-26 ENCOUNTER — MYC MEDICAL ADVICE (OUTPATIENT)
Dept: OBGYN | Facility: CLINIC | Age: 32
End: 2017-08-26

## 2017-08-26 DIAGNOSIS — N95.2 ATROPHIC VAGINITIS: Primary | ICD-10-CM

## 2017-08-29 NOTE — TELEPHONE ENCOUNTER
Recommend Astroglide (easily found at [employer]) or Sylk (good for sensitive skin as well, can be ordered online at [employer] if not found in a store). Make sure it's water based if using a condom. If still having issues after using this, may need prescription for vaginal estrogen, so send a message and I will authorize it.    June Jones MD

## 2017-09-10 RX ORDER — ESTRADIOL 0.1 MG/G
CREAM VAGINAL
Qty: 42.5 G | Refills: 1 | Status: SHIPPED | OUTPATIENT
Start: 2017-09-10 | End: 2018-06-20

## 2017-09-20 ENCOUNTER — TRANSFERRED RECORDS (OUTPATIENT)
Dept: HEALTH INFORMATION MANAGEMENT | Facility: CLINIC | Age: 32
End: 2017-09-20

## 2017-10-01 ENCOUNTER — MYC REFILL (OUTPATIENT)
Dept: INTERNAL MEDICINE | Facility: CLINIC | Age: 32
End: 2017-10-01

## 2017-10-01 DIAGNOSIS — F41.9 ANXIETY: ICD-10-CM

## 2017-10-02 RX ORDER — SERTRALINE HYDROCHLORIDE 100 MG/1
100 TABLET, FILM COATED ORAL DAILY
Qty: 30 TABLET | Refills: 0 | Status: SHIPPED | OUTPATIENT
Start: 2017-10-02 | End: 2017-10-04

## 2017-10-02 RX ORDER — BUSPIRONE HYDROCHLORIDE 15 MG/1
30 TABLET ORAL 2 TIMES DAILY
Qty: 120 TABLET | Refills: 0 | Status: SHIPPED | OUTPATIENT
Start: 2017-10-02 | End: 2017-10-04

## 2017-10-02 NOTE — TELEPHONE ENCOUNTER
Zoloft     Last Written Prescription Date: 9/15/17  Last Fill Quantity: 90, # refills: 3  Last Office Visit with [facility] primary care provider:  9/15/16        Last PHQ-9 score on record=   PHQ-9 SCORE 6/9/2016   Total Score 0       Buspar        Last Written Prescription Date: 9/15/17  Last Fill Quantity: 90; # refills: 3  Last Office Visit with [facility], [facility] or [facility] prescribing provider:  9/15/16        Last PHQ-9 score on record=   PHQ-9 SCORE 6/9/2016   Total Score 0       Lab Results   Component Value Date    AST 9 09/15/2016     Lab Results   Component Value Date    ALT 16 09/15/2016       Labs showing if normal/abnormal  Lab Results   Component Value Date    AST 9 09/15/2016    ALT 16 09/15/2016

## 2017-10-02 NOTE — TELEPHONE ENCOUNTER
Message from MyChart:  Original authorizing provider: Tay Lopes MD, MD Seema Nielson would like a refill of the following medications:  sertraline (ZOLOFT) 100 MG tablet [Tay Lopes MD, MD]  busPIRone (BUSPAR) 15 MG tablet [Tay Lopes MD, MD]    Preferred pharmacy: [facility] [place], [place] [address] [address]    Comment:

## 2017-10-04 ENCOUNTER — OFFICE VISIT (OUTPATIENT)
Dept: INTERNAL MEDICINE | Facility: CLINIC | Age: 32
End: 2017-10-04
Payer: COMMERCIAL

## 2017-10-04 VITALS
BODY MASS INDEX: 24.82 KG/M2 | SYSTOLIC BLOOD PRESSURE: 106 MMHG | WEIGHT: 145.4 LBS | HEIGHT: 64 IN | RESPIRATION RATE: 16 BRPM | TEMPERATURE: 98.3 F | HEART RATE: 88 BPM | DIASTOLIC BLOOD PRESSURE: 70 MMHG | OXYGEN SATURATION: 98 %

## 2017-10-04 DIAGNOSIS — F41.9 ANXIETY: ICD-10-CM

## 2017-10-04 PROBLEM — Z36.89 ENCOUNTER FOR TRIAGE IN PREGNANT PATIENT: Status: RESOLVED | Noted: 2017-05-25 | Resolved: 2017-10-04

## 2017-10-04 PROCEDURE — 99213 OFFICE O/P EST LOW 20 MIN: CPT | Performed by: NURSE PRACTITIONER

## 2017-10-04 RX ORDER — SERTRALINE HYDROCHLORIDE 100 MG/1
100 TABLET, FILM COATED ORAL DAILY
Qty: 90 TABLET | Refills: 3 | Status: SHIPPED | OUTPATIENT
Start: 2017-10-04 | End: 2018-06-20

## 2017-10-04 RX ORDER — BUSPIRONE HYDROCHLORIDE 15 MG/1
30 TABLET ORAL 2 TIMES DAILY
Qty: 460 TABLET | Refills: 3 | Status: SHIPPED | OUTPATIENT
Start: 2017-10-04 | End: 2018-06-20

## 2017-10-04 ASSESSMENT — ANXIETY QUESTIONNAIRES
1. FEELING NERVOUS, ANXIOUS, OR ON EDGE: NOT AT ALL
2. NOT BEING ABLE TO STOP OR CONTROL WORRYING: NOT AT ALL
GAD7 TOTAL SCORE: 0
7. FEELING AFRAID AS IF SOMETHING AWFUL MIGHT HAPPEN: NOT AT ALL
5. BEING SO RESTLESS THAT IT IS HARD TO SIT STILL: NOT AT ALL
IF YOU CHECKED OFF ANY PROBLEMS ON THIS QUESTIONNAIRE, HOW DIFFICULT HAVE THESE PROBLEMS MADE IT FOR YOU TO DO YOUR WORK, TAKE CARE OF THINGS AT HOME, OR GET ALONG WITH OTHER PEOPLE: NOT DIFFICULT AT ALL
3. WORRYING TOO MUCH ABOUT DIFFERENT THINGS: NOT AT ALL
6. BECOMING EASILY ANNOYED OR IRRITABLE: NOT AT ALL

## 2017-10-04 ASSESSMENT — PATIENT HEALTH QUESTIONNAIRE - PHQ9: 5. POOR APPETITE OR OVEREATING: NOT AT ALL

## 2017-10-04 NOTE — PROGRESS NOTES
SUBJECTIVE:   Seema Nielson is a 32 year old female who presents to clinic today for the following health issues:      Anxiety Follow-Up    Status since last visit: No change    Other associated symptoms:None    Complicating factors:   Significant life event: 4 month old daughter, breastfeeding   Current substance abuse: None  Depression symptoms: No  RHETT-7 SCORE 6/9/2016   Total Score 2       GAD7              Amount of exercise or physical activity: None    Problems taking medications regularly: No    Medication side effects: none  Diet: regular (no restrictions)        Patient Active Problem List   Diagnosis     TMJ (dislocation of temporomandibular joint)     Constipation     RHETT (generalized anxiety disorder)     Irregular menses     Migraine without aura and without status migrainosus, not intractable     Anxiety     First pregnancy     Past Surgical History:   Procedure Laterality Date     APPENDECTOMY OPEN  2004     MANDIBLE SURGERY  2003, 2006    To treat TMJ (Done by oromaxillary sugeon).     NEURAL STIMULATOR DEVICE      Done by urologist in [facility] to treat constipation.     REMOVE STIMULATOR SACRAL NERVE  10/15/2013    Procedure: REMOVE STIMULATOR SACRAL NERVE;   REPLACEMENT NEURO STIMULATOR BATTERY ;  Surgeon: Deacon Casiano MD;  Location: [facility]       Social History   Substance Use Topics     Smoking status: Never Smoker     Smokeless tobacco: Never Used     Alcohol use Not on file      Comment: One drink/week (prior to pregnancy)     Family History   Problem Relation Age of Onset     Hypertension Mother      Born 1953     Family History Negative Father      Born 1957     Breast Cancer Paternal Grandmother 50     Hypertension Paternal Grandmother      OSTEOPOROSIS Paternal Grandmother      Hyperlipidemia Maternal Grandmother      Hypertension Maternal Grandmother      CEREBROVASCULAR DISEASE Maternal Grandmother 80     OSTEOPOROSIS Paternal Aunt          Current Outpatient  "Prescriptions   Medication Sig Dispense Refill     sertraline (ZOLOFT) 100 MG tablet Take 1 tablet (100 mg) by mouth daily 90 tablet 3     busPIRone (BUSPAR) 15 MG tablet Take 2 tablets (30 mg) by mouth 2 times daily 460 tablet 3     estradiol (ESTRACE VAGINAL) 0.1 MG/GM cream Apply a small amount externally every night for 4-6 weeks. 42.5 g 1     tretinoin (RETIN-A) 0.025 % cream APPLY A PEA-SIZED AMOUNT TO AFFECTED AREAS QPM  5     FINACEA 15 % FOAM APPLY TO AFFECTED AREAS QD  11     multivitamin, therapeutic with minerals (MULTI-VITAMIN) TABS tablet Take 1 tablet by mouth daily       Docosahexaenoic Acid (DHA PO) Take 2 tablets daily.       SUMAtriptan (IMITREX) 100 MG tablet Take 100 mg by mouth at onset of headache for migraine Reported on 5/17/2017       Misc. Devices (BREAST PUMP) MIS 1 each as needed 1 each 0     [DISCONTINUED] sertraline (ZOLOFT) 100 MG tablet Take 1 tablet (100 mg) by mouth daily 30 tablet 0     BP Readings from Last 3 Encounters:   10/04/17 106/70   07/20/17 104/58   06/27/17 110/60    Wt Readings from Last 3 Encounters:   10/04/17 145 lb 6.4 oz (66 kg)   07/20/17 151 lb 9.6 oz (68.8 kg)   06/27/17 152 lb (68.9 kg)                        Reviewed and updated as needed this visit by clinical staffTobacco  Allergies  Meds  Med Hx  Surg Hx  Fam Hx  Soc Hx      Reviewed and updated as needed this visit by Provider         ROS:  Constitutional, HEENT, cardiovascular, pulmonary, gi and gu systems are negative, except as otherwise noted.      OBJECTIVE:   /70 (BP Location: Right arm, Patient Position: Sitting, Cuff Size: Adult Regular)  Pulse 88  Temp 98.3  F (36.8  C) (Oral)  Resp 16  Ht 5' 4\" (1.626 m)  Wt 145 lb 6.4 oz (66 kg)  SpO2 98%  BMI 24.96 kg/m2  Body mass index is 24.96 kg/(m^2).  GENERAL: healthy, alert and no distress  PSYCH: mentation appears normal, affect normal/bright        ASSESSMENT/PLAN:               ICD-10-CM    1. Anxiety F41.9 sertraline (ZOLOFT) 100 " MG tablet     busPIRone (BUSPAR) 15 MG tablet       Patient Instructions   Continue meds    Pauline Wang, NP  [facility]

## 2017-10-04 NOTE — MR AVS SNAPSHOT
"              After Visit Summary   10/4/2017    Seema Nielson    MRN: [id number]           Patient Information     Date Of Birth          1985        Visit Information        Provider Department      10/4/2017 2:00 PM Pauline Wang NP [facility]        Today's Diagnoses     Anxiety          Care Instructions    Continue meds    Pauline Wang CNP            Follow-ups after your visit        Who to contact     If you have questions or need follow up information about today's clinic visit or your schedule please contact [facility] directly at [phone].  Normal or non-critical lab and imaging results will be communicated to you by Anhelohart, letter or phone within 4 business days after the clinic has received the results. If you do not hear from us within 7 days, please contact the clinic through Need Fixedt or phone. If you have a critical or abnormal lab result, we will notify you by phone as soon as possible.  Submit refill requests through Terascore or call your pharmacy and they will forward the refill request to us. Please allow 3 business days for your refill to be completed.          Additional Information About Your Visit        MyChart Information     Terascore gives you secure access to your electronic health record. If you see a primary care provider, you can also send messages to your care team and make appointments. If you have questions, please call your primary care clinic.  If you do not have a primary care provider, please call [phone] and they will assist you.        Care EveryWhere ID     This is your Care EveryWhere ID. This could be used by other organizations to access your [place] medical records  [id number]        Your Vitals Were     Pulse Temperature Respirations Height Pulse Oximetry BMI (Body Mass Index)    88 98.3  F (36.8  C) (Oral) 16 5' 4\" (1.626 m) 98% 24.96 kg/m2       Blood Pressure from Last 3 Encounters:   10/04/17 106/70 "   07/20/17 104/58   06/27/17 110/60    Weight from Last 3 Encounters:   10/04/17 145 lb 6.4 oz (66 kg)   07/20/17 151 lb 9.6 oz (68.8 kg)   06/27/17 152 lb (68.9 kg)              Today, you had the following     No orders found for display         Where to get your medicines      These medications were sent to [facility] [place] [place] - [address]  [address], [place] [address]     Phone:  [phone]     busPIRone 15 MG tablet    sertraline 100 MG tablet          Primary Care Provider Office Phone # Fax #    Tay Lopes [phone]       [address]  [facility] [address]        Equal Access to Services     TERESA VICTOR : Hadii genoveva thurman hadasho Soomaali, waaxda luqadaha, qaybta kaalmada adeegyada, flash aguayo . So [facility] [phone].    ATENCIÓN: Si habla español, tiene a spear disposición servicios gratuitos de asistencia lingüística. Evan al [phone].    We comply with applicable federal civil rights laws and [place] laws. We do not discriminate on the basis of race, color, national origin, age, disability, sex, sexual orientation, or gender identity.            Thank you!     Thank you for choosing [facility]  for your care. Our goal is always to provide you with excellent care. Hearing back from our patients is one way we can continue to improve our services. Please take a few minutes to complete the written survey that you may receive in the mail after your visit with us. Thank you!             Your Updated Medication List - Protect others around you: Learn how to safely use, store and throw away your medicines at www.disposemymeds.org.          This list is accurate as of: 10/4/17  2:22 PM.  Always use your most recent med list.                   Brand Name Dispense Instructions for use Diagnosis    breast pump Misc     1 each    1 each as needed    Prenatal care, first pregnancy in  third trimester       busPIRone 15 MG tablet    BUSPAR    460 tablet    Take 2 tablets (30 mg) by mouth 2 times daily    Anxiety       DHA PO      Take 2 tablets daily.        estradiol 0.1 MG/GM cream    ESTRACE VAGINAL    42.5 g    Apply a small amount externally every night for 4-6 weeks.    Atrophic vaginitis       FINACEA 15 % Foam   Generic drug:  Azelaic Acid      APPLY TO AFFECTED AREAS QD        IMITREX 100 MG tablet   Generic drug:  SUMAtriptan      Take 100 mg by mouth at onset of headache for migraine Reported on 5/17/2017        Multi-vitamin Tabs tablet      Take 1 tablet by mouth daily        sertraline 100 MG tablet    ZOLOFT    90 tablet    Take 1 tablet (100 mg) by mouth daily    Anxiety       tretinoin 0.025 % cream    RETIN-A     APPLY A PEA-SIZED AMOUNT TO AFFECTED AREAS QPM

## 2017-10-04 NOTE — NURSING NOTE
"Chief Complaint   Patient presents with     Recheck Medication     zoloft and buspar       Initial /70 (BP Location: Right arm, Patient Position: Sitting, Cuff Size: Adult Regular)  Pulse 88  Temp 98.3  F (36.8  C) (Oral)  Resp 16  Ht 5' 4\" (1.626 m)  Wt 145 lb 6.4 oz (66 kg)  SpO2 98%  BMI 24.96 kg/m2 Estimated body mass index is 24.96 kg/(m^2) as calculated from the following:    Height as of this encounter: 5' 4\" (1.626 m).    Weight as of this encounter: 145 lb 6.4 oz (66 kg).  Medication Reconciliation: complete    "

## 2017-10-05 ASSESSMENT — ANXIETY QUESTIONNAIRES: GAD7 TOTAL SCORE: 0

## 2018-01-12 ENCOUNTER — TRANSFERRED RECORDS (OUTPATIENT)
Dept: HEALTH INFORMATION MANAGEMENT | Facility: CLINIC | Age: 33
End: 2018-01-12

## 2018-04-18 ENCOUNTER — TRANSFERRED RECORDS (OUTPATIENT)
Dept: HEALTH INFORMATION MANAGEMENT | Facility: CLINIC | Age: 33
End: 2018-04-18

## 2018-06-08 ENCOUNTER — OFFICE VISIT (OUTPATIENT)
Dept: OBGYN | Facility: CLINIC | Age: 33
End: 2018-06-08
Payer: COMMERCIAL

## 2018-06-08 VITALS
HEART RATE: 71 BPM | SYSTOLIC BLOOD PRESSURE: 111 MMHG | HEIGHT: 64 IN | WEIGHT: 135 LBS | DIASTOLIC BLOOD PRESSURE: 71 MMHG | BODY MASS INDEX: 23.05 KG/M2

## 2018-06-08 DIAGNOSIS — Z30.09 FAMILY PLANNING ADVICE: ICD-10-CM

## 2018-06-08 DIAGNOSIS — Z00.00 ROUTINE GENERAL MEDICAL EXAMINATION AT A HEALTH CARE FACILITY: Primary | ICD-10-CM

## 2018-06-08 PROCEDURE — 99395 PREV VISIT EST AGE 18-39: CPT | Performed by: OBSTETRICS & GYNECOLOGY

## 2018-06-08 NOTE — MR AVS SNAPSHOT
"              After Visit Summary   6/8/2018    Seema Nielson    MRN: [id number]           Patient Information     Date Of Birth          1985        Visit Information        Provider Department      6/8/2018 2:00 PM June Jones MD [facility]        Today's Diagnoses     Routine general medical examination at a health care facility    -  1    Family planning advice           Follow-ups after your visit        Who to contact     If you have questions or need follow up information about today's clinic visit or your schedule please contact [facility] directly at [phone].  Normal or non-critical lab and imaging results will be communicated to you by Jukin Mediahart, letter or phone within 4 business days after the clinic has received the results. If you do not hear from us within 7 days, please contact the clinic through Jukin Mediahart or phone. If you have a critical or abnormal lab result, we will notify you by phone as soon as possible.  Submit refill requests through V[employer]-a-[employer] or call your pharmacy and they will forward the refill request to us. Please allow 3 business days for your refill to be completed.          Additional Information About Your Visit        MyChart Information     [employer] gives you secure access to your electronic health record. If you see a primary care provider, you can also send messages to your care team and make appointments. If you have questions, please call your primary care clinic.  If you do not have a primary care provider, please call [phone] and they will assist you.        Care EveryWhere ID     This is your Care EveryWhere ID. This could be used by other organizations to access your [place] medical records  [id number]        Your Vitals Were     Pulse Height Breastfeeding? BMI (Body Mass Index)          71 5' 4\" (1.626 m) Yes 23.17 kg/m2         Blood Pressure from Last 3 Encounters:   06/08/18 111/71   10/04/17 106/70   07/20/17 " 104/58    Weight from Last 3 Encounters:   06/08/18 135 lb (61.2 kg)   10/04/17 145 lb 6.4 oz (66 kg)   07/20/17 151 lb 9.6 oz (68.8 kg)              Today, you had the following     No orders found for display       Primary Care Provider Office Phone # Fax #    Tay Lopes [phone]       [address] [facility] [address]  [facility] [address]        Equal Access to Services     [facility] : Hadii aad ku hadasho Soomaali, waaxda luqadaha, qaybta kaalmada adeegyada, waxay idiin hayaan adeeg kharash layoana . So [facility] [phone].    ATENCIÓN: Si habla español, tiene a spear disposición servicios gratuitos de asistencia lingüística. [facility] [phone].    We comply with applicable federal civil rights laws and [place] laws. We do not discriminate on the basis of race, color, national origin, age, disability, sex, sexual orientation, or gender identity.            Thank you!     Thank you for choosing [facility]  for your care. Our goal is always to provide you with excellent care. Hearing back from our patients is one way we can continue to improve our services. Please take a few minutes to complete the written survey that you may receive in the mail after your visit with us. Thank you!             Your Updated Medication List - Protect others around you: Learn how to safely use, store and throw away your medicines at www.disposemymeds.org.          This list is accurate as of 6/8/18  2:52 PM.  Always use your most recent med list.                   Brand Name Dispense Instructions for use Diagnosis    breast pump Misc     1 each    1 each as needed    Prenatal care, first pregnancy in third trimester       busPIRone 15 MG tablet    BUSPAR    460 tablet    Take 2 tablets (30 mg) by mouth 2 times daily    Anxiety       DHA PO      Take 2 tablets daily.        estradiol 0.1 MG/GM cream    ESTRACE VAGINAL    42.5 g    Apply a small amount externally every night for 4-6 weeks.     Atrophic vaginitis       FINACEA 15 % Foam   Generic drug:  Azelaic Acid      APPLY TO AFFECTED AREAS QD        IMITREX 100 MG tablet   Generic drug:  SUMAtriptan      Take 100 mg by mouth at onset of headache for migraine Reported on 5/17/2017        Multi-vitamin Tabs tablet      Take 1 tablet by mouth daily        sertraline 100 MG tablet    ZOLOFT    90 tablet    Take 1 tablet (100 mg) by mouth daily    Anxiety       tretinoin 0.025 % cream    RETIN-A     APPLY A PEA-SIZED AMOUNT TO AFFECTED AREAS QPM

## 2018-06-08 NOTE — NURSING NOTE
"Chief Complaint   Patient presents with     Physical     Annual exam. Pap up to date.        Initial /71  Pulse 71  Ht 5' 4\" (1.626 m)  Wt 135 lb (61.2 kg)  Breastfeeding? Yes  BMI 23.17 kg/m2 Estimated body mass index is 23.17 kg/(m^2) as calculated from the following:    Height as of this encounter: 5' 4\" (1.626 m).    Weight as of this encounter: 135 lb (61.2 kg).  BP completed using cuff size: regular        The following HM Due: NONE      The following patient reported/Care Every where data was sent to:  P ABSTRACT QUALITY INITIATIVES [55031]     Ana María Ordaz LPN                "

## 2018-06-20 ENCOUNTER — OFFICE VISIT (OUTPATIENT)
Dept: INTERNAL MEDICINE | Facility: CLINIC | Age: 33
End: 2018-06-20
Payer: COMMERCIAL

## 2018-06-20 VITALS
DIASTOLIC BLOOD PRESSURE: 58 MMHG | OXYGEN SATURATION: 97 % | WEIGHT: 132.2 LBS | SYSTOLIC BLOOD PRESSURE: 90 MMHG | TEMPERATURE: 97.7 F | RESPIRATION RATE: 16 BRPM | HEIGHT: 64 IN | HEART RATE: 106 BPM | BODY MASS INDEX: 22.57 KG/M2

## 2018-06-20 DIAGNOSIS — F41.1 GAD (GENERALIZED ANXIETY DISORDER): ICD-10-CM

## 2018-06-20 DIAGNOSIS — G43.009 MIGRAINE WITHOUT AURA AND WITHOUT STATUS MIGRAINOSUS, NOT INTRACTABLE: ICD-10-CM

## 2018-06-20 DIAGNOSIS — Z00.00 HEALTH CARE MAINTENANCE: Primary | ICD-10-CM

## 2018-06-20 DIAGNOSIS — F41.9 ANXIETY: ICD-10-CM

## 2018-06-20 LAB
ERYTHROCYTE [DISTWIDTH] IN BLOOD BY AUTOMATED COUNT: 12.2 % (ref 10–15)
HCT VFR BLD AUTO: 41.7 % (ref 35–47)
HGB BLD-MCNC: 14 G/DL (ref 11.7–15.7)
MCH RBC QN AUTO: 29.9 PG (ref 26.5–33)
MCHC RBC AUTO-ENTMCNC: 33.6 G/DL (ref 31.5–36.5)
MCV RBC AUTO: 89 FL (ref 78–100)
PLATELET # BLD AUTO: 185 10E9/L (ref 150–450)
RBC # BLD AUTO: 4.69 10E12/L (ref 3.8–5.2)
WBC # BLD AUTO: 4.6 10E9/L (ref 4–11)

## 2018-06-20 PROCEDURE — 80061 LIPID PANEL: CPT | Performed by: NURSE PRACTITIONER

## 2018-06-20 PROCEDURE — 36415 COLL VENOUS BLD VENIPUNCTURE: CPT | Performed by: NURSE PRACTITIONER

## 2018-06-20 PROCEDURE — 84443 ASSAY THYROID STIM HORMONE: CPT | Performed by: NURSE PRACTITIONER

## 2018-06-20 PROCEDURE — 84460 ALANINE AMINO (ALT) (SGPT): CPT | Performed by: NURSE PRACTITIONER

## 2018-06-20 PROCEDURE — 80048 BASIC METABOLIC PNL TOTAL CA: CPT | Performed by: NURSE PRACTITIONER

## 2018-06-20 PROCEDURE — 99395 PREV VISIT EST AGE 18-39: CPT | Performed by: NURSE PRACTITIONER

## 2018-06-20 PROCEDURE — 85027 COMPLETE CBC AUTOMATED: CPT | Performed by: NURSE PRACTITIONER

## 2018-06-20 RX ORDER — BUSPIRONE HYDROCHLORIDE 15 MG/1
30 TABLET ORAL 2 TIMES DAILY
Qty: 460 TABLET | Refills: 3 | Status: SHIPPED | OUTPATIENT
Start: 2018-06-20 | End: 2019-01-22

## 2018-06-20 RX ORDER — SUMATRIPTAN 100 MG/1
100 TABLET, FILM COATED ORAL
Qty: 30 TABLET | Refills: 3 | Status: SHIPPED | OUTPATIENT
Start: 2018-06-20 | End: 2019-09-11

## 2018-06-20 RX ORDER — SERTRALINE HYDROCHLORIDE 100 MG/1
100 TABLET, FILM COATED ORAL DAILY
Qty: 90 TABLET | Refills: 3 | Status: SHIPPED | OUTPATIENT
Start: 2018-06-20 | End: 2019-02-26

## 2018-06-20 RX ORDER — ONDANSETRON 4 MG/1
4-8 TABLET, ORALLY DISINTEGRATING ORAL EVERY 8 HOURS PRN
Qty: 20 TABLET | Refills: 1 | Status: SHIPPED | OUTPATIENT
Start: 2018-06-20 | End: 2018-11-27

## 2018-06-20 NOTE — NURSING NOTE
"BP 90/58 (BP Location: Right arm, Patient Position: Sitting, Cuff Size: Adult Large)  Pulse 106  Temp 97.7  F (36.5  C) (Oral)  Resp 16  Ht 5' 4\" (1.626 m)  Wt 132 lb 3.2 oz (60 kg)  SpO2 97%  BMI 22.69 kg/m2  "

## 2018-06-20 NOTE — LETTER
June 21, 2018      Seema Ellerzler  [address] [facility] [address]        Dear ,    We are writing to inform you of your test results.    Your recent lab results were normal.    Resulted Orders   CBC with platelets   Result Value Ref Range    WBC 4.6 4.0 - 11.0 10e9/L    RBC Count 4.69 3.8 - 5.2 10e12/L    Hemoglobin 14.0 11.7 - 15.7 g/dL    Hematocrit 41.7 35.0 - 47.0 %    MCV 89 78 - 100 fl    MCH 29.9 26.5 - 33.0 pg    MCHC 33.6 31.5 - 36.5 g/dL    RDW 12.2 10.0 - 15.0 %    Platelet Count 185 150 - 450 10e9/L   Basic metabolic panel   Result Value Ref Range    Sodium 139 133 - 144 mmol/L    Potassium 4.1 3.4 - 5.3 mmol/L    Chloride 104 94 - 109 mmol/L    Carbon Dioxide 30 20 - 32 mmol/L    Anion Gap 5 3 - 14 mmol/L    Glucose 80 70 - 99 mg/dL      Comment:      Fasting specimen    Urea Nitrogen 19 7 - 30 mg/dL    Creatinine 0.93 0.52 - 1.04 mg/dL    GFR Estimate 69 >60 mL/min/1.7m2      Comment:      Non  GFR Calc    GFR Estimate If Black 84 >60 mL/min/1.7m2      Comment:       GFR Calc    Calcium 9.3 8.5 - 10.1 mg/dL   ALT   Result Value Ref Range    ALT 23 0 - 50 U/L   Lipid Profile   Result Value Ref Range    Cholesterol 155 <200 mg/dL    Triglycerides 68 <150 mg/dL      Comment:      Fasting specimen    HDL Cholesterol 50 >49 mg/dL    LDL Cholesterol Calculated 91 <100 mg/dL      Comment:      Desirable:       <100 mg/dl    Non HDL Cholesterol 105 <130 mg/dL   TSH with free T4 reflex   Result Value Ref Range    TSH 2.08 0.40 - 4.00 mU/L       If you have any questions or concerns, please call the clinic at the number listed above.       Sincerely,        Pauline Wang NP

## 2018-06-20 NOTE — PROGRESS NOTES
SUBJECTIVE:   CC: Seema Nielson is an 33 year old woman who presents for preventive health visit.     Physical   Annual:     Getting at least 3 servings of Calcium per day::  Yes    Bi-annual eye exam::  Yes    Dental care twice a year::  Yes    Sleep apnea or symptoms of sleep apnea::  None    Diet::  Regular (no restrictions)    Frequency of exercise::  1 day/week    Duration of exercise::  15-30 minutes    Taking medications regularly::  Yes    Medication side effects::  Not applicable    Additional concerns today::  No                Anxiety Follow-Up    Status since last visit: No change    Other associated symptoms:None    Complicating factors:   Significant life event: No   Current substance abuse: None  Depression symptoms: No  RHETT-7 SCORE 6/9/2016 10/4/2017   Total Score 2 0       RHETT-7    Today's PHQ-2 Score:   PHQ-2 ( 1999 [employer]) 6/20/2018   Q1: Little interest or pleasure in doing things 0   Q2: Feeling down, depressed or hopeless 0   PHQ-2 Score 0   Q1: Little interest or pleasure in doing things -   Q2: Feeling down, depressed or hopeless -   PHQ-2 Score -       Abuse: Current or Past(Physical, Sexual or Emotional)- No  Do you feel safe in your environment - Yes    Social History   Substance Use Topics     Smoking status: Never Smoker     Smokeless tobacco: Never Used     Alcohol use Not on file      Comment: One drink/week (prior to pregnancy)     Alcohol Use 6/14/2018   If you drink alcohol do you typically have greater than 3 drinks per day OR greater than 7 drinks per week? No       Reviewed orders with patient.  Reviewed health maintenance and updated orders accordingly - Yes  BP Readings from Last 3 Encounters:   06/20/18 90/58   06/08/18 111/71   10/04/17 106/70    Wt Readings from Last 3 Encounters:   06/20/18 132 lb 3.2 oz (60 kg)   06/08/18 135 lb (61.2 kg)   10/04/17 145 lb 6.4 oz (66 kg)                  Patient Active Problem List   Diagnosis     TMJ (dislocation of  temporomandibular joint)     Constipation     RHETT (generalized anxiety disorder)     Irregular menses     Migraine without aura and without status migrainosus, not intractable     Anxiety     First pregnancy     Past Surgical History:   Procedure Laterality Date     APPENDECTOMY OPEN  2004     MANDIBLE SURGERY  2003, 2006    To treat TMJ (Done by oromaxillary sugeon).     NEURAL STIMULATOR DEVICE      Done by urologist in [facility] to treat constipation.     REMOVE STIMULATOR SACRAL NERVE  10/15/2013    Procedure: REMOVE STIMULATOR SACRAL NERVE;   REPLACEMENT NEURO STIMULATOR BATTERY ;  Surgeon: Deacon Casiano MD;  Location: [facility]       Social History   Substance Use Topics     Smoking status: Never Smoker     Smokeless tobacco: Never Used     Alcohol use Not on file      Comment: One drink/week (prior to pregnancy)     Family History   Problem Relation Age of Onset     Hypertension Mother      Born 1953     Family History Negative Father      Born 1957     Breast Cancer Paternal Grandmother 50     Hypertension Paternal Grandmother      Osteoperosis Paternal Grandmother      Hyperlipidemia Maternal Grandmother      Hypertension Maternal Grandmother      Cerebrovascular Disease Maternal Grandmother 80     Osteoperosis Paternal Aunt          Current Outpatient Prescriptions   Medication Sig Dispense Refill     busPIRone (BUSPAR) 15 MG tablet Take 2 tablets (30 mg) by mouth 2 times daily 460 tablet 3     Docosahexaenoic Acid (DHA PO) Take 2 tablets daily.       FINACEA 15 % FOAM APPLY TO AFFECTED AREAS QD  11     multivitamin, therapeutic with minerals (MULTI-VITAMIN) TABS tablet Take 1 tablet by mouth daily       ondansetron (ZOFRAN ODT) 4 MG ODT tab Take 1-2 tablets (4-8 mg) by mouth every 8 hours as needed for nausea 20 tablet 1     sertraline (ZOLOFT) 100 MG tablet Take 1 tablet (100 mg) by mouth daily 90 tablet 3     SUMAtriptan (IMITREX) 100 MG tablet Take 1 tablet (100 mg) by mouth at onset of  headache for migraine Reported on 5/17/2017 30 tablet 3     tretinoin (RETIN-A) 0.025 % cream APPLY A PEA-SIZED AMOUNT TO AFFECTED AREAS QPM  5     [DISCONTINUED] sertraline (ZOLOFT) 100 MG tablet Take 1 tablet (100 mg) by mouth daily 90 tablet 3       Mammogram not appropriate for this patient based on age.    Pertinent mammograms are reviewed under the imaging tab.  History of abnormal Pap smear: NO - age 30- 65 PAP every 3 years recommended    Reviewed and updated as needed this visit by clinical staff  Allergies  Meds         Reviewed and updated as needed this visit by Provider            Review of Systems  CONSTITUTIONAL: NEGATIVE for fever, chills, change in weight  RESP: NEGATIVE for significant cough or SOB  CV: NEGATIVE for chest pain, palpitations or peripheral edema  GI: NEGATIVE for nausea, abdominal pain, heartburn, or change in bowel habits  : NEGATIVE for unusual urinary or vaginal symptoms. Periods are regular.  MUSCULOSKELETAL: NEGATIVE for significant arthralgias or myalgia  NEURO: NEGATIVE for weakness, dizziness or paresthesias  PSYCHIATRIC: NEGATIVE for changes in mood or affect     OBJECTIVE:   There were no vitals taken for this visit.  Physical Exam  GENERAL: healthy, alert and no distress  NECK: no adenopathy, no asymmetry, masses, or scars and thyroid normal to palpation  RESP: lungs clear to auscultation - no rales, rhonchi or wheezes  CV: regular rate and rhythm, normal S1 S2, no S3 or S4, no murmur, click or rub, no peripheral edema and peripheral pulses strong  ABDOMEN: soft, nontender, no hepatosplenomegaly, no masses and bowel sounds normal  MS: no gross musculoskeletal defects noted, no edema  NEURO: Normal strength and tone, mentation intact and speech normal  PSYCH: mentation appears normal, affect normal/bright    ASSESSMENT/PLAN:       ICD-10-CM    1. Health care maintenance Z00.00 CBC with platelets     Basic metabolic panel     TSH with free T4 reflex   2. RHETT (generalized  "anxiety disorder) F41.1    3. Migraine without aura and without status migrainosus, not intractable G43.009 ALT     Lipid Profile     SUMAtriptan (IMITREX) 100 MG tablet     ondansetron (ZOFRAN ODT) 4 MG ODT tab   4. Anxiety F41.9 busPIRone (BUSPAR) 15 MG tablet     sertraline (ZOLOFT) 100 MG tablet       COUNSELING:  Reviewed preventive health counseling, as reflected in patient instructions         reports that she has never smoked. She has never used smokeless tobacco.    Estimated body mass index is 23.17 kg/(m^2) as calculated from the following:    Height as of 6/8/18: 5' 4\" (1.626 m).    Weight as of 6/8/18: 135 lb (61.2 kg).       Counseling Resources:  ATP IV Guidelines  Pooled Cohorts Equation Calculator  Breast Cancer Risk Calculator  FRAX Risk Assessment  ICSI Preventive Guidelines  Dietary Guidelines for Americans, 2010  USDA's MyPlate  ASA Prophylaxis  Lung CA Screening    Pauline Wang NP  [facility]  Answers for HPI/ROS submitted by the patient on 6/14/2018   PHQ-2 Score: 0    "

## 2018-06-20 NOTE — MR AVS SNAPSHOT
After Visit Summary   6/20/2018    Seema Nielson    MRN: [id number]           Patient Information     Date Of Birth          1985        Visit Information        Provider Department      6/20/2018 8:40 AM Pauline Wang NP [facility]        Today's Diagnoses     Health care maintenance    -  1    RHETT (generalized anxiety disorder)        Migraine without aura and without status migrainosus, not intractable        Anxiety          Care Instructions      Preventive Health Recommendations  Female Ages 26 - 39  Yearly exam:   See your health care provider every year in order to    Review health changes.     Discuss preventive care.      Review your medicines if you your doctor has prescribed any.    Until age 30: Get a Pap test every three years (more often if you have had an abnormal result).    After age 30: Talk to your doctor about whether you should have a Pap test every 3 years or have a Pap test with HPV screening every 5 years.   You do not need a Pap test if your uterus was removed (hysterectomy) and you have not had cancer.  You should be tested each year for STDs (sexually transmitted diseases), if you're at risk.   Talk to your provider about how often to have your cholesterol checked.  If you are at risk for diabetes, you should have a diabetes test (fasting glucose).  Shots: Get a flu shot each year. Get a tetanus shot every 10 years.   Nutrition:     Eat at least 5 servings of fruits and vegetables each day.    Eat whole-grain bread, whole-wheat pasta and brown rice instead of white grains and rice.    Talk to your provider about Calcium and Vitamin D.     Lifestyle    Exercise at least 150 minutes a week (30 minutes a day, 5 days of the week). This will help you control your weight and prevent disease.    Limit alcohol to one drink per day.    No smoking.     Wear sunscreen to prevent skin cancer.    See your dentist every six months for an exam and  "cleaning.            Follow-ups after your visit        Who to contact     If you have questions or need follow up information about today's clinic visit or your schedule please contact [facility] directly at [phone].  Normal or non-critical lab and imaging results will be communicated to you by MyChart, letter or phone within 4 business days after the clinic has received the results. If you do not hear from us within 7 days, please contact the clinic through [employer] or phone. If you have a critical or abnormal lab result, we will notify you by phone as soon as possible.  Submit refill requests through [employer] or call your pharmacy and they will forward the refill request to us. Please allow 3 business days for your refill to be completed.          Additional Information About Your Visit        [employer] Information     [employer] gives you secure access to your electronic health record. If you see a primary care provider, you can also send messages to your care team and make appointments. If you have questions, please call your primary care clinic.  If you do not have a primary care provider, please call [phone] and they will assist you.        Care EveryWhere ID     This is your Care EveryWhere ID. This could be used by other organizations to access your [facility] medical records  [id number]        Your Vitals Were     Pulse Temperature Respirations Height Pulse Oximetry BMI (Body Mass Index)    106 97.7  F (36.5  C) (Oral) 16 5' 4\" (1.626 m) 97% 22.69 kg/m2       Blood Pressure from Last 3 Encounters:   06/20/18 90/58   06/08/18 111/71   10/04/17 106/70    Weight from Last 3 Encounters:   06/20/18 132 lb 3.2 oz (60 kg)   06/08/18 135 lb (61.2 kg)   10/04/17 145 lb 6.4 oz (66 kg)              We Performed the Following     ALT     Basic metabolic panel     CBC with platelets     Lipid Profile     TSH with free T4 reflex          Today's Medication Changes          These changes are accurate " as of 6/20/18  9:18 AM.  If you have any questions, ask your nurse or doctor.               Start taking these medicines.        Dose/Directions    ondansetron 4 MG ODT tab   Commonly known as:  ZOFRAN ODT   Used for:  Migraine without aura and without status migrainosus, not intractable   Started by:  Pauline Wang NP        Dose:  4-8 mg   Take 1-2 tablets (4-8 mg) by mouth every 8 hours as needed for nausea   Quantity:  20 tablet   Refills:  1            Where to get your medicines      These medications were sent to [facility] [place] [facility] [address]  [address], [place] [address]     Phone:  [phone]     busPIRone 15 MG tablet    ondansetron 4 MG ODT tab    sertraline 100 MG tablet    SUMAtriptan 100 MG tablet                Primary Care Provider Office Phone # Fax #    Tay Lopes [phone]       [address] [facility] [address]  [facility] [address]        Equal Access to Services     [facility]: Hadii aad ku hadasho Soomaali, waaxda luqadaha, qaybta kaalmada adeegyada, waxay idiin hayaan molina aguayo . So [facility] [phone].    ATENCIÓN: Si habla español, tiene a spear disposición servicios gratuitos de asistencia lingüística. [facility] [phone].    We comply with applicable federal civil rights laws and [place] laws. We do not discriminate on the basis of race, color, national origin, age, disability, sex, sexual orientation, or gender identity.            Thank you!     Thank you for choosing [facility]  for your care. Our goal is always to provide you with excellent care. Hearing back from our patients is one way we can continue to improve our services. Please take a few minutes to complete the written survey that you may receive in the mail after your visit with us. Thank you!             Your Updated Medication List - Protect others around you: Learn how to safely use, store and throw away your  medicines at www.disposemymeds.org.          This list is accurate as of 6/20/18  9:18 AM.  Always use your most recent med list.                   Brand Name Dispense Instructions for use Diagnosis    busPIRone 15 MG tablet    BUSPAR    460 tablet    Take 2 tablets (30 mg) by mouth 2 times daily    Anxiety       DHA PO      Take 2 tablets daily.        FINACEA 15 % Foam   Generic drug:  Azelaic Acid      APPLY TO AFFECTED AREAS QD        Multi-vitamin Tabs tablet      Take 1 tablet by mouth daily        ondansetron 4 MG ODT tab    ZOFRAN ODT    20 tablet    Take 1-2 tablets (4-8 mg) by mouth every 8 hours as needed for nausea    Migraine without aura and without status migrainosus, not intractable       sertraline 100 MG tablet    ZOLOFT    90 tablet    Take 1 tablet (100 mg) by mouth daily    Anxiety       SUMAtriptan 100 MG tablet    IMITREX    30 tablet    Take 1 tablet (100 mg) by mouth at onset of headache for migraine Reported on 5/17/2017    Migraine without aura and without status migrainosus, not intractable       tretinoin 0.025 % cream    RETIN-A     APPLY A PEA-SIZED AMOUNT TO AFFECTED AREAS QPM

## 2018-06-21 LAB
ALT SERPL W P-5'-P-CCNC: 23 U/L (ref 0–50)
ANION GAP SERPL CALCULATED.3IONS-SCNC: 5 MMOL/L (ref 3–14)
BUN SERPL-MCNC: 19 MG/DL (ref 7–30)
CALCIUM SERPL-MCNC: 9.3 MG/DL (ref 8.5–10.1)
CHLORIDE SERPL-SCNC: 104 MMOL/L (ref 94–109)
CHOLEST SERPL-MCNC: 155 MG/DL
CO2 SERPL-SCNC: 30 MMOL/L (ref 20–32)
CREAT SERPL-MCNC: 0.93 MG/DL (ref 0.52–1.04)
GFR SERPL CREATININE-BSD FRML MDRD: 69 ML/MIN/1.7M2
GLUCOSE SERPL-MCNC: 80 MG/DL (ref 70–99)
HDLC SERPL-MCNC: 50 MG/DL
LDLC SERPL CALC-MCNC: 91 MG/DL
NONHDLC SERPL-MCNC: 105 MG/DL
POTASSIUM SERPL-SCNC: 4.1 MMOL/L (ref 3.4–5.3)
SODIUM SERPL-SCNC: 139 MMOL/L (ref 133–144)
TRIGL SERPL-MCNC: 68 MG/DL
TSH SERPL DL<=0.005 MIU/L-ACNC: 2.08 MU/L (ref 0.4–4)

## 2018-07-03 ENCOUNTER — MYC MEDICAL ADVICE (OUTPATIENT)
Dept: OBGYN | Facility: CLINIC | Age: 33
End: 2018-07-03

## 2018-07-03 NOTE — TELEPHONE ENCOUNTER
Please advise     In view of above history; recommend appt with Dr. Jones to discuss plan and coordination of events

## 2018-07-03 NOTE — TELEPHONE ENCOUNTER
Dr Jones,    Pt has appt 8/15/18 to discuss fertility and Interstim (that she already has).  Asks about being added in sooner.  You are fully booked until then, I told her I would check for any possible placement.  Let me know your thoughts.    Anyi RIVERO R.N.  [facility]

## 2018-07-03 NOTE — TELEPHONE ENCOUNTER
I spoke with the patient today regarding questions that she has regarding her sacral neuromodulation InterStim implant.  The patient states that she had the InterStim placed when she was approximately 20 years old for constipation.  She has conceived in the past and just turned the unit off and is wondering what the best treatment plan would be.  [employer] did not include pregnant patients as a part of their initial studies and trials hence there is no specific recommendation that it is either safe or unsafe during pregnancy and the effect on the fetus from [employer] standpoint is unknown at this time.  I did contact the company and asked for current outside studies that have been done on the safety and efficacy of InterStim during pregnancy.  My own literature search was unsuccessful.  I also reviewed with the patient that regardless of the effects of sacral neuromodulation pregnancy can have an effect on lead placement and on placement of the pulse generator making the device less effective after delivery compared to before delivery.  I have requested the latest literature on this topic and can forward a copy to Dr. Jones who could either discuss this with the patient or ask me should there be other questions.  Patient will further address her ongoing care is with Dr. Jones when she returns  Thank you  Royal Ashton MD FACOG

## 2018-07-10 NOTE — TELEPHONE ENCOUNTER
OK to add on a call day if patient prefers that, just let her know we may need to reschedule. Thanks.  June Jones MD

## 2018-07-27 ENCOUNTER — OFFICE VISIT (OUTPATIENT)
Dept: OBGYN | Facility: CLINIC | Age: 33
End: 2018-07-27
Payer: COMMERCIAL

## 2018-07-27 VITALS — SYSTOLIC BLOOD PRESSURE: 100 MMHG | WEIGHT: 139.2 LBS | BODY MASS INDEX: 23.89 KG/M2 | DIASTOLIC BLOOD PRESSURE: 64 MMHG

## 2018-07-27 DIAGNOSIS — N91.2 ABSENCE OF MENSTRUATION: Primary | ICD-10-CM

## 2018-07-27 DIAGNOSIS — Z31.69 ENCOUNTER FOR PRECONCEPTION CONSULTATION: ICD-10-CM

## 2018-07-27 PROCEDURE — 99214 OFFICE O/P EST MOD 30 MIN: CPT | Performed by: OBSTETRICS & GYNECOLOGY

## 2018-07-27 NOTE — PROGRESS NOTES
SUBJECTIVE:                                                   Seema Nielson is a 33 year old female who presents to clinic today to follow up for amenorrhea after breastfeeding and fertility concerns. Please see my last note for complete details.    Her questions today are:  1. When to turn off her Interstim device for planning pregnancy?  2. When to stop her Botox injections for migraines without aura to plan for pregnancy?  3. When to consider Clomid/when she should get a period back after breastfeeding?    She stopped breastfeeding a month ago. No periods yet. Prior to her last pregnancy, conceived with Clomid, her periods were very irregular, about every 45-60 days.      Problem list and histories reviewed & adjusted, as indicated.  Additional history: as documented.    Patient Active Problem List   Diagnosis     TMJ (dislocation of temporomandibular joint)     Constipation     RHETT (generalized anxiety disorder)     Irregular menses     Migraine without aura and without status migrainosus, not intractable     Anxiety     First pregnancy     Past Surgical History:   Procedure Laterality Date     APPENDECTOMY OPEN  2004     MANDIBLE SURGERY  2003, 2006    To treat TMJ (Done by oromaxillary sugeon).     NEURAL STIMULATOR DEVICE      Done by urologist in [facility] to treat constipation.     REMOVE STIMULATOR SACRAL NERVE  10/15/2013    Procedure: REMOVE STIMULATOR SACRAL NERVE;   REPLACEMENT NEURO STIMULATOR BATTERY ;  Surgeon: Deacon Casiano MD;  Location: [facility]      Social History   Substance Use Topics     Smoking status: Never Smoker     Smokeless tobacco: Never Used     Alcohol use Not on file      Comment: One drink/week (prior to pregnancy)      Problem (# of Occurrences) Relation (Name,Age of Onset)    Breast Cancer (1) Paternal Grandmother (50)    Cerebrovascular Disease (1) Maternal Grandmother (80)    Family History Negative (1) Father: Born 1957    Hyperlipidemia (1) Maternal  Grandmother    Hypertension (3) Mother: Born 1953, Paternal Grandmother, Maternal Grandmother    Osteoperosis (2) Paternal Grandmother, Paternal Aunt              Current Outpatient Prescriptions on File Prior to Visit:  multivitamin, therapeutic with minerals (MULTI-VITAMIN) TABS tablet Take 1 tablet by mouth daily   ondansetron (ZOFRAN ODT) 4 MG ODT tab Take 1-2 tablets (4-8 mg) by mouth every 8 hours as needed for nausea   sertraline (ZOLOFT) 100 MG tablet Take 1 tablet (100 mg) by mouth daily   SUMAtriptan (IMITREX) 100 MG tablet Take 1 tablet (100 mg) by mouth at onset of headache for migraine Reported on 5/17/2017   busPIRone (BUSPAR) 15 MG tablet Take 2 tablets (30 mg) by mouth 2 times daily   Docosahexaenoic Acid (DHA PO) Take 2 tablets daily.   FINACEA 15 % FOAM APPLY TO AFFECTED AREAS QD   tretinoin (RETIN-A) 0.025 % cream APPLY A PEA-SIZED AMOUNT TO AFFECTED AREAS QPM     No current facility-administered medications on file prior to visit.   Allergies   Allergen Reactions     Thimerosal      Thorazine [Chlorpromazine]      Gets panic attacks per pt       ROS:  5 point ROS negative except as noted above in HPI, including Gen., Resp., CV, GI &  system review.    OBJECTIVE:     No exam was necessary today as this was entirely a counseling appointment.    In-Clinic Test Results:  No results found for this or any previous visit (from the past 24 hour(s)).    ASSESSMENT/PLAN:                                                        ICD-10-CM    1. Absence of menstruation N91.2 progesterone (PROMETRIUM) 200 MG capsule   2. Encounter for preconception consultation Z31.69          Reviewed that there is little to no data on Botox for migraines or Interstim in pregnancy. She was aware of this. Was very appreciative of Dr. Ashton's input while I was away, as well.    Given out discussion, the plan is:    1. Progesterone to induce a withdrawal bleed. If she then has regular cycles, will consider stopping Botox and  turning off Interstim with Dr. Ashton in preparation for pregnancy. Not using birth control.    2. If no w/d bleed, or no further bleeding after progesterone withdrawal: pregnancy test. If negative, then will consider starting Clomid if they are ready right away, and if not, will start oral contraceptive pills and then keep Interstim on and continue her Botox until ready for Clomid. All questions answered.      June Jones MD  [facility]

## 2018-07-27 NOTE — MR AVS SNAPSHOT
After Visit Summary   7/27/2018    Seema Nielson    MRN: [id number]           Patient Information     Date Of Birth          1985        Visit Information        Provider Department      7/27/2018 1:15 PM June Jones MD [facility]        Today's Diagnoses     Absence of menstruation    -  1    Encounter for preconception consultation           Follow-ups after your visit        Your next 10 appointments already scheduled     Aug 15, 2018  2:15 PM CDT   SHORT with June Jones MD   [facility] ([facility])    [address] [place] [address] [id number] [phone]              Who to contact     If you have questions or need follow up information about today's clinic visit or your schedule please contact [facility] directly at [phone].  Normal or non-critical lab and imaging results will be communicated to you by MyChart, letter or phone within 4 business days after the clinic has received the results. If you do not hear from us within 7 days, please contact the clinic through MyChart or phone. If you have a critical or abnormal lab result, we will notify you by phone as soon as possible.  Submit refill requests through [email or web address] or call your pharmacy and they will forward the refill request to us. Please allow 3 business days for your refill to be completed.          Additional Information About Your Visit        MyChart Information     [email or web address] gives you secure access to your electronic health record. If you see a primary care provider, you can also send messages to your care team and make appointments. If you have questions, please call your primary care clinic.  If you do not have a primary care provider, please call [phone] and they will assist you.        Care EveryWhere ID     This is your Care EveryWhere ID. This could be used by other organizations to access your [facility]  records  [id number]        Your Vitals Were     Breastfeeding? BMI (Body Mass Index)                No 23.89 kg/m2           Blood Pressure from Last 3 Encounters:   07/27/18 100/64   06/20/18 90/58   06/08/18 111/71    Weight from Last 3 Encounters:   07/27/18 139 lb 3.2 oz (63.1 kg)   06/20/18 132 lb 3.2 oz (60 kg)   06/08/18 135 lb (61.2 kg)              Today, you had the following     No orders found for display         Today's Medication Changes          These changes are accurate as of 7/27/18  5:03 PM.  If you have any questions, ask your nurse or doctor.               Start taking these medicines.        Dose/Directions    progesterone 200 MG capsule   Commonly known as:  PROMETRIUM   Used for:  Absence of menstruation   Started by:  June Jones MD        Dose:  200 mg   Take 1 capsule (200 mg) by mouth daily for 10 days   Quantity:  10 capsule   Refills:  0            Where to get your medicines      These medications were sent to [place] [address] [place] [address] [address]     Phone:  [phone]     progesterone 200 MG capsule                Primary Care Provider Office Phone # Fax #    Tay Lopes [phone]       [address] [address]        Equal Access to Services     TERESA VICTOR AH: Hadii genoveva thurman hadasho Soclayali, waaxda luqadaha, qaybta kaalmada adeflacoyada, flash veras. So [facility] [phone].    ATENCIÓN: Si habla español, tiene a spear disposición servicios gratuitos de asistencia lingüística. Evan al [phone].    We comply with applicable federal civil rights laws and [place] laws. We do not discriminate on the basis of race, color, national origin, age, disability, sex, sexual orientation, or gender identity.            Thank you!     Thank you for choosing [facility]  for your care. Our goal is always to provide you with excellent  care. Hearing back from our patients is one way we can continue to improve our services. Please take a few minutes to complete the written survey that you may receive in the mail after your visit with us. Thank you!             Your Updated Medication List - Protect others around you: Learn how to safely use, store and throw away your medicines at www.disposemymeds.org.          This list is accurate as of 7/27/18  5:03 PM.  Always use your most recent med list.                   Brand Name Dispense Instructions for use Diagnosis    busPIRone 15 MG tablet    BUSPAR    460 tablet    Take 2 tablets (30 mg) by mouth 2 times daily    Anxiety       DHA PO      Take 2 tablets daily.        FINACEA 15 % Foam   Generic drug:  Azelaic Acid      APPLY TO AFFECTED AREAS QD        Multi-vitamin Tabs tablet      Take 1 tablet by mouth daily        ondansetron 4 MG ODT tab    ZOFRAN ODT    20 tablet    Take 1-2 tablets (4-8 mg) by mouth every 8 hours as needed for nausea    Migraine without aura and without status migrainosus, not intractable       progesterone 200 MG capsule    PROMETRIUM    10 capsule    Take 1 capsule (200 mg) by mouth daily for 10 days    Absence of menstruation       sertraline 100 MG tablet    ZOLOFT    90 tablet    Take 1 tablet (100 mg) by mouth daily    Anxiety       SUMAtriptan 100 MG tablet    IMITREX    30 tablet    Take 1 tablet (100 mg) by mouth at onset of headache for migraine Reported on 5/17/2017    Migraine without aura and without status migrainosus, not intractable       tretinoin 0.025 % cream    RETIN-A     APPLY A PEA-SIZED AMOUNT TO AFFECTED AREAS QPM

## 2018-07-27 NOTE — NURSING NOTE
"Chief Complaint   Patient presents with     Consult     Discuss attempting pregnancy x 4 months now, but concerned re: hx of migraines and interstem.     initial /64  Wt 139 lb 3.2 oz (63.1 kg)  Breastfeeding? No  BMI 23.89 kg/m2 Estimated body mass index is 23.89 kg/(m^2) as calculated from the following:    Height as of 6/20/18: 5' 4\" (1.626 m).    Weight as of this encounter: 139 lb 3.2 oz (63.1 kg).  BP completed using cuff size regular.  Angeline Jessica CMA    "

## 2018-10-08 DIAGNOSIS — Z32.01 PREGNANCY TEST POSITIVE: Primary | ICD-10-CM

## 2018-10-08 PROBLEM — Z34.00 FIRST PREGNANCY: Status: RESOLVED | Noted: 2017-04-26 | Resolved: 2018-10-08

## 2018-10-12 ENCOUNTER — HOSPITAL ENCOUNTER (OUTPATIENT)
Dept: ULTRASOUND IMAGING | Facility: CLINIC | Age: 33
End: 2018-10-12
Attending: OBSTETRICS & GYNECOLOGY
Payer: COMMERCIAL

## 2018-10-12 DIAGNOSIS — Z32.01 PREGNANCY TEST POSITIVE: ICD-10-CM

## 2018-10-12 PROCEDURE — 76801 OB US < 14 WKS SINGLE FETUS: CPT

## 2018-10-18 ENCOUNTER — MYC MEDICAL ADVICE (OUTPATIENT)
Dept: OBGYN | Facility: CLINIC | Age: 33
End: 2018-10-18

## 2018-10-18 DIAGNOSIS — O21.9 NAUSEA AND VOMITING IN PREGNANCY: Primary | ICD-10-CM

## 2018-10-18 RX ORDER — ONDANSETRON 4 MG/1
4 TABLET, FILM COATED ORAL EVERY 8 HOURS PRN
Qty: 30 TABLET | Refills: 3 | Status: SHIPPED | OUTPATIENT
Start: 2018-10-18 | End: 2019-06-27

## 2018-10-18 NOTE — TELEPHONE ENCOUNTER
Can we rx something for her as she has tried the unisom and vit b6 tid?    Anyi RIVERO R.N.  [facility]

## 2018-10-29 ENCOUNTER — PRENATAL OFFICE VISIT (OUTPATIENT)
Dept: NURSING | Facility: CLINIC | Age: 33
End: 2018-10-29
Payer: COMMERCIAL

## 2018-10-29 DIAGNOSIS — Z34.81 ENCOUNTER FOR SUPERVISION OF OTHER NORMAL PREGNANCY IN FIRST TRIMESTER: ICD-10-CM

## 2018-10-29 DIAGNOSIS — Z34.81 ENCOUNTER FOR SUPERVISION OF OTHER NORMAL PREGNANCY IN FIRST TRIMESTER: Primary | ICD-10-CM

## 2018-10-29 LAB
ABO + RH BLD: NORMAL
ABO + RH BLD: NORMAL
BLD GP AB SCN SERPL QL: NORMAL
BLOOD BANK CMNT PATIENT-IMP: NORMAL
ERYTHROCYTE [DISTWIDTH] IN BLOOD BY AUTOMATED COUNT: 12.5 % (ref 10–15)
HCT VFR BLD AUTO: 35 % (ref 35–47)
HGB BLD-MCNC: 11.8 G/DL (ref 11.7–15.7)
MCH RBC QN AUTO: 30.5 PG (ref 26.5–33)
MCHC RBC AUTO-ENTMCNC: 33.7 G/DL (ref 31.5–36.5)
MCV RBC AUTO: 90 FL (ref 78–100)
PLATELET # BLD AUTO: 191 10E9/L (ref 150–450)
RBC # BLD AUTO: 3.87 10E12/L (ref 3.8–5.2)
SPECIMEN EXP DATE BLD: NORMAL
WBC # BLD AUTO: 6.6 10E9/L (ref 4–11)

## 2018-10-29 PROCEDURE — 87086 URINE CULTURE/COLONY COUNT: CPT | Performed by: OBSTETRICS & GYNECOLOGY

## 2018-10-29 PROCEDURE — 99207 ZZC NO CHARGE NURSE ONLY: CPT

## 2018-10-29 PROCEDURE — 85027 COMPLETE CBC AUTOMATED: CPT | Performed by: OBSTETRICS & GYNECOLOGY

## 2018-10-29 PROCEDURE — 87389 HIV-1 AG W/HIV-1&-2 AB AG IA: CPT | Performed by: OBSTETRICS & GYNECOLOGY

## 2018-10-29 PROCEDURE — 86780 TREPONEMA PALLIDUM: CPT | Performed by: OBSTETRICS & GYNECOLOGY

## 2018-10-29 PROCEDURE — 86900 BLOOD TYPING SEROLOGIC ABO: CPT | Performed by: OBSTETRICS & GYNECOLOGY

## 2018-10-29 PROCEDURE — 87340 HEPATITIS B SURFACE AG IA: CPT | Performed by: OBSTETRICS & GYNECOLOGY

## 2018-10-29 PROCEDURE — 36415 COLL VENOUS BLD VENIPUNCTURE: CPT | Performed by: OBSTETRICS & GYNECOLOGY

## 2018-10-29 PROCEDURE — 86762 RUBELLA ANTIBODY: CPT | Performed by: OBSTETRICS & GYNECOLOGY

## 2018-10-29 PROCEDURE — 86850 RBC ANTIBODY SCREEN: CPT | Performed by: OBSTETRICS & GYNECOLOGY

## 2018-10-29 PROCEDURE — 86901 BLOOD TYPING SEROLOGIC RH(D): CPT | Performed by: OBSTETRICS & GYNECOLOGY

## 2018-10-29 NOTE — PROGRESS NOTES
9w3d  Estimated Date of Delivery: May 31, 2019      Prenatal book and folder (containing standard educational hand-outs and brochures) given to patient. Information in folder reviewed. Questions answered.     Discussed and gave written information on options available for 1st and 2nd trimester screening, CVS, amniocentesis, AFP, and QUAD screen plus optimal time-frame for testing. Brochure given on optional screening available to determine Cystic Fibrosis carrier status. Pt instructed to check with insurance regarding coverage of these optional tests. Pt advised to call back if she desires testing or has any questions or concerns.    Prenatal labs obtained. New prenatal visit scheduled on 11/27 with Dr. Jones.        Mary Jo Gutierrez, RN      Answers for HPI/ROS submitted by the patient on 10/29/2018   PHQ-2 Score: 0

## 2018-10-29 NOTE — MR AVS SNAPSHOT
After Visit Summary   10/29/2018    Seema Nielson    MRN: [id number]           Patient Information     Date Of Birth          1985        Visit Information        Provider Department      10/29/2018 2:00 PM RI PRENATAL NURSE [facility]        Today's Diagnoses     Encounter for supervision of other normal pregnancy in first trimester           Follow-ups after your visit        Your next 10 appointments already scheduled     Nov 27, 2018  1:45 PM CST   New Prenatal with June Jones MD   [facility] ([facility])    [address] [place]  [address]  [facility] [id number] [phone]              Who to contact     If you have questions or need follow up information about today's clinic visit or your schedule please contact [facility] directly at [phone].  Normal or non-critical lab and imaging results will be communicated to you by MyChart, letter or phone within 4 business days after the clinic has received the results. If you do not hear from us within 7 days, please contact the clinic through [employer] or phone. If you have a critical or abnormal lab result, we will notify you by phone as soon as possible.  Submit refill requests through [employer] or call your pharmacy and they will forward the refill request to us. Please allow 3 business days for your refill to be completed.          Additional Information About Your Visit        MyChart Information     [employer] gives you secure access to your electronic health record. If you see a primary care provider, you can also send messages to your care team and make appointments. If you have questions, please call your primary care clinic.  If you do not have a primary care provider, please call [phone] and they will assist you.        Care EveryWhere ID     This is your Care EveryWhere ID. This could be used by other organizations to access your [facility]  records  [id number]        Your Vitals Were     Last Period                   08/08/2018 (Approximate)            Blood Pressure from Last 3 Encounters:   07/27/18 100/64   06/20/18 90/58   06/08/18 111/71    Weight from Last 3 Encounters:   07/27/18 139 lb 3.2 oz (63.1 kg)   06/20/18 132 lb 3.2 oz (60 kg)   06/08/18 135 lb (61.2 kg)              We Performed the Following     ABO/Rh type and screen     CBC with platelets     Hepatitis B surface antigen     HIV Antigen Antibody Combo     Rubella Antibody IgG Quantitative     Treponema Abs w Reflex to RPR and Titer     Urine Culture Aerobic Bacterial        Primary Care Provider Office Phone # Fax #    Tay Lopes [phone]       [address] [address]        Equal Access to Services     TIFFANIE VICTOR : Hadii aad ku hadasho Soomaali, waaxda luqadaha, qaybta kaalmada adeegyada, flash george haydaniel aguayo . So [facility] [phone].    ATENCIÓN: Si habla español, tiene a spear disposición servicios gratuitos de asistencia lingüística. Evan al [phone].    We comply with applicable federal civil rights laws and [place] laws. We do not discriminate on the basis of race, color, national origin, age, disability, sex, sexual orientation, or gender identity.            Thank you!     Thank you for choosing [facility]  for your care. Our goal is always to provide you with excellent care. Hearing back from our patients is one way we can continue to improve our services. Please take a few minutes to complete the written survey that you may receive in the mail after your visit with us. Thank you!             Your Updated Medication List - Protect others around you: Learn how to safely use, store and throw away your medicines at www.disposemymeds.org.          This list is accurate as of 10/29/18  3:28 PM.  Always use your most recent med list.                   Brand Name Dispense Instructions for use  Diagnosis    busPIRone 15 MG tablet    BUSPAR    460 tablet    Take 2 tablets (30 mg) by mouth 2 times daily    Anxiety       DHA PO      Take 2 tablets daily.        doxylamine 25 MG Tabs tablet    UNISOM     Take 25 mg by mouth At Bedtime        FINACEA 15 % Foam   Generic drug:  Azelaic Acid      APPLY TO AFFECTED AREAS QD        metoclopramide 5 MG tablet    REGLAN    30 tablet    Take 1 tablet (5 mg) by mouth 4 times daily as needed    Chronic migraine without aura without status migrainosus, not intractable       Multi-vitamin Tabs tablet      Take 1 tablet by mouth daily        ondansetron 4 MG ODT tab    ZOFRAN ODT    20 tablet    Take 1-2 tablets (4-8 mg) by mouth every 8 hours as needed for nausea    Migraine without aura and without status migrainosus, not intractable       ondansetron 4 MG tablet    ZOFRAN    30 tablet    Take 1 tablet (4 mg) by mouth every 8 hours as needed for nausea    Nausea and vomiting in pregnancy       progesterone 200 MG capsule    PROMETRIUM    10 capsule    Take 1 capsule (200 mg) by mouth daily    Absence of menstruation       sertraline 100 MG tablet    ZOLOFT    90 tablet    Take 1 tablet (100 mg) by mouth daily    Anxiety       SUMAtriptan 100 MG tablet    IMITREX    30 tablet    Take 1 tablet (100 mg) by mouth at onset of headache for migraine Reported on 5/17/2017    Migraine without aura and without status migrainosus, not intractable       VITAMIN B6 PO

## 2018-10-30 ENCOUNTER — TRANSFERRED RECORDS (OUTPATIENT)
Dept: HEALTH INFORMATION MANAGEMENT | Facility: CLINIC | Age: 33
End: 2018-10-30

## 2018-10-30 LAB
BACTERIA SPEC CULT: NORMAL
HBV SURFACE AG SERPL QL IA: NONREACTIVE
HIV 1+2 AB+HIV1 P24 AG SERPL QL IA: NONREACTIVE
SPECIMEN SOURCE: NORMAL

## 2018-11-01 LAB
RUBV IGG SERPL IA-ACNC: 14 IU/ML
T PALLIDUM AB SER QL: NONREACTIVE

## 2018-11-27 ENCOUNTER — PRENATAL OFFICE VISIT (OUTPATIENT)
Dept: OBGYN | Facility: CLINIC | Age: 33
End: 2018-11-27
Payer: COMMERCIAL

## 2018-11-27 VITALS — BODY MASS INDEX: 26.09 KG/M2 | DIASTOLIC BLOOD PRESSURE: 58 MMHG | WEIGHT: 152 LBS | SYSTOLIC BLOOD PRESSURE: 118 MMHG

## 2018-11-27 DIAGNOSIS — Z34.83 ENCOUNTER FOR SUPERVISION OF OTHER NORMAL PREGNANCY IN THIRD TRIMESTER: ICD-10-CM

## 2018-11-27 DIAGNOSIS — Z34.81 PRENATAL CARE, SUBSEQUENT PREGNANCY, FIRST TRIMESTER: ICD-10-CM

## 2018-11-27 DIAGNOSIS — G43.009 MIGRAINE WITHOUT AURA AND WITHOUT STATUS MIGRAINOSUS, NOT INTRACTABLE: ICD-10-CM

## 2018-11-27 DIAGNOSIS — Z11.3 SCREEN FOR STD (SEXUALLY TRANSMITTED DISEASE): Primary | ICD-10-CM

## 2018-11-27 PROCEDURE — 40000791 ZZHCL STATISTIC VERIFI PRENATAL TRISOMY 21,18,13: Mod: 90 | Performed by: OBSTETRICS & GYNECOLOGY

## 2018-11-27 PROCEDURE — 87491 CHLMYD TRACH DNA AMP PROBE: CPT | Performed by: OBSTETRICS & GYNECOLOGY

## 2018-11-27 PROCEDURE — 87591 N.GONORRHOEAE DNA AMP PROB: CPT | Performed by: OBSTETRICS & GYNECOLOGY

## 2018-11-27 PROCEDURE — 99207 ZZC FIRST OB VISIT: CPT | Performed by: OBSTETRICS & GYNECOLOGY

## 2018-11-27 PROCEDURE — 36415 COLL VENOUS BLD VENIPUNCTURE: CPT | Performed by: OBSTETRICS & GYNECOLOGY

## 2018-11-27 PROCEDURE — 99000 SPECIMEN HANDLING OFFICE-LAB: CPT | Performed by: OBSTETRICS & GYNECOLOGY

## 2018-11-27 RX ORDER — BUTALBITAL, ACETAMINOPHEN AND CAFFEINE 50; 325; 40 MG/1; MG/1; MG/1
1 TABLET ORAL EVERY 4 HOURS PRN
Qty: 28 TABLET | Refills: 1 | Status: SHIPPED | OUTPATIENT
Start: 2018-11-27 | End: 2019-06-27

## 2018-11-27 NOTE — NURSING NOTE
"Chief Complaint   Patient presents with     Prenatal Care       Initial /58  Wt 152 lb (68.9 kg)  LMP 2018 (Approximate)  BMI 26.09 kg/m2 Estimated body mass index is 26.09 kg/(m^2) as calculated from the following:    Height as of 18: 5' 4\" (1.626 m).    Weight as of this encounter: 152 lb (68.9 kg).  BP completed using cuff size: regular    Questioned patient about current smoking habits.  Pt. has never smoked.          The following HM Due: NONE      +headaches  +nausea    Angeles Hamilton CMA           "

## 2018-11-27 NOTE — MR AVS SNAPSHOT
After Visit Summary   11/27/2018    Seema Nielson    MRN: [id number]           Patient Information     Date Of Birth          1985        Visit Information        Provider Department      11/27/2018 1:45 PM June Jones MD [facility]        Today's Diagnoses     Screen for STD (sexually transmitted disease)    -  1    Migraine without aura and without status migrainosus, not intractable        Encounter for supervision of other normal pregnancy in third trimester        Prenatal care, subsequent pregnancy, first trimester           Follow-ups after your visit        Your next 10 appointments already scheduled     Jan 03, 2019  2:15 PM CST   [facility]ABL[facility]ENATAL with [facility] Jones MD   [facility]    [address] [place]  [address]  [facility] [address] [phone]            Jan 29, 2019  3:45 PM CST   ESTABLISHED PRENATAL with June Jones MD   [facility]    [address] [place]  [address]  [facility] [id number] [phone]              Who to contact     If you have questions or need follow up information about today's clinic visit or your schedule please contact [facility] directly at [phone].  Normal or non-critical lab and imaging results will be communicated to you by MyChart, letter or phone within 4 business days after the clinic has received the results. If you do not hear from us within 7 days, please contact the clinic through MyChart or phone. If you have a critical or abnormal lab result, we will notify you by phone as soon as possible.  Submit refill requests through [employer] or call your pharmacy and they will forward the refill request to us. Please allow 3 business days for your refill to be completed.          Additional Information About Your Visit        [employer] Information     [employer] gives you secure access to your  electronic health record. If you see a primary care provider, you can also send messages to your care team and make appointments. If you have questions, please call your primary care clinic.  If you do not have a primary care provider, please call [phone] and they will assist you.        Care EveryWhere ID     This is your Care EveryWhere ID. This could be used by other organizations to access your [facility] medical records  [id number]        Your Vitals Were     Last Period BMI (Body Mass Index)                08/08/2018 (Approximate) 26.09 kg/m2           Blood Pressure from Last 3 Encounters:   11/27/18 118/58   07/27/18 100/64   06/20/18 90/58    Weight from Last 3 Encounters:   11/27/18 152 lb (68.9 kg)   07/27/18 139 lb 3.2 oz (63.1 kg)   06/20/18 132 lb 3.2 oz (60 kg)              We Performed the Following     CHLAMYDIA TRACHOMATIS PCR     NEISSERIA GONORRHOEA PCR     Non Invasive Prenatal Test Cell Free DNA          Today's Medication Changes          These changes are accurate as of 11/27/18  4:31 PM.  If you have any questions, ask your nurse or doctor.               Start taking these medicines.        Dose/Directions    butalbital-acetaminophen-caffeine -40 MG tablet   Commonly known as:  FIORICET/ESGIC   Used for:  Migraine without aura and without status migrainosus, not intractable        Dose:  1 tablet   Take 1 tablet by mouth every 4 hours as needed for migraine   Quantity:  28 tablet   Refills:  1         Stop taking these medicines if you haven't already. Please contact your care team if you have questions.     ondansetron 4 MG ODT tab   Commonly known as:  ZOFRAN ODT           progesterone 200 MG capsule   Commonly known as:  PROMETRIUM                Where to get your medicines      Some of these will need a paper prescription and others can be bought over the counter.  Ask your nurse if you have questions.     Bring a paper prescription for each of these medications      butalbital-acetaminophen-caffeine -40 MG tablet                Primary Care Provider Office Phone # Fax #    Tay Lopes [phone]       [address] [facility] [address]  [facility] [address]        Equal Access to Services     TIFFANIE VICTOR : Hadii aad ku hadasho Soomaali, waaxda luqadaha, qaybta kaalmada adeegyada, waxay idiin hayaan adeeg khmarysh layoana veras. So [facility] [phone].    ATENCIÓN: Si habla español, tiene a spear disposición servicios gratuitos de asistencia lingüística. Llame al [phone].    We comply with applicable federal civil rights laws and [place] laws. We do not discriminate on the basis of race, color, national origin, age, disability, sex, sexual orientation, or gender identity.            Thank you!     Thank you for choosing [facility]  for your care. Our goal is always to provide you with excellent care. Hearing back from our patients is one way we can continue to improve our services. Please take a few minutes to complete the written survey that you may receive in the mail after your visit with us. Thank you!             Your Updated Medication List - Protect others around you: Learn how to safely use, store and throw away your medicines at www.disposemymeds.org.          This list is accurate as of 11/27/18  4:31 PM.  Always use your most recent med list.                   Brand Name Dispense Instructions for use Diagnosis    busPIRone 15 MG tablet    BUSPAR    460 tablet    Take 2 tablets (30 mg) by mouth 2 times daily    Anxiety       butalbital-acetaminophen-caffeine -40 MG tablet    FIORICET/ESGIC    28 tablet    Take 1 tablet by mouth every 4 hours as needed for migraine    Migraine without aura and without status migrainosus, not intractable       DHA PO      Take 2 tablets daily.        doxylamine 25 MG Tabs tablet    UNISOM     Take 25 mg by mouth At Bedtime        FINACEA 15 % Foam   Generic drug:  Azelaic Acid      APPLY TO AFFECTED  AREAS QD        metoclopramide 5 MG tablet    REGLAN    30 tablet    Take 1 tablet (5 mg) by mouth 4 times daily as needed    Chronic migraine without aura without status migrainosus, not intractable       Multi-vitamin tablet      Take 1 tablet by mouth daily        ondansetron 4 MG tablet    ZOFRAN    30 tablet    Take 1 tablet (4 mg) by mouth every 8 hours as needed for nausea    Nausea and vomiting in pregnancy       sertraline 100 MG tablet    ZOLOFT    90 tablet    Take 1 tablet (100 mg) by mouth daily    Anxiety       SUMAtriptan 100 MG tablet    IMITREX    30 tablet    Take 1 tablet (100 mg) by mouth at onset of headache for migraine Reported on 5/17/2017    Migraine without aura and without status migrainosus, not intractable       VITAMIN B6 PO

## 2018-11-27 NOTE — PROGRESS NOTES
This is a 33 year old female patient,   who presents for her first obstetrical visit. This pregnancy is Planned, Desired.    EDC May 31, 2019 by Previous US which makes her 13w4d  today.  Her cycles are irregular.  Her last menstrual period was abnormal. Since her LMP, she has experienced  nausea, emesis and headache.  She denies abdominal pain and vaginal bleeding. Ultrasound in the 1st trimester showed EDC inconsistent with dates by LMP.     Zofran helps some with nausea. Biggest issue is migraines--seeing her neurologist, doing lidocaine injections which helps some. Was using Imitrex but stopped. Long history of migraines not responsive to many medical therapies--was using Botox prior to pregnancy.    Obstetric History       T1      L1     SAB0   TAB0   Ectopic0   Multiple0   Live Births1       # Outcome Date GA Lbr Amari/2nd Weight Sex Delivery Anes PTL Lv   2 Current            1 Term 17 39w4d 04:36 / 06:31 7 lb 6.7 oz (3.365 kg) F Vag-Spont EPI N DEMARCO      Name: GLORIA,BABYYolanda VICENTE      Apgar1:  7                Apgar5: 9          History of GDM: No,  PTL : No,  History of HTN in pregnancy: No,  Thrombocytopenia: No,  Shoulder dystocia: No,  Vacuum Extraction: No  PPH: No   3rd of 4th degree laceration: No.   Other complications: No      Since her last LMP she denies use of alcohol, tobacco and street drugs.    HISTORY:  Past Medical History:   Diagnosis Date     Abnormal Pap smear of cervix     Treated with Cryo.  Normal paps since then.  Last pap 2016     Anxiety state, unspecified     On Zoloft, well controlled. Managed by Dr. Zhu/Marianne     Battery end of life of spinal cord stimulator      Constipation      Constipation      H/O: depression      History of vaccination against human papillomavirus      Migraine, unspecified, without mention of intractable migraine without mention of status migrainosus     Managed by Dr. Zhu/Marianne     OAB (overactive bladder)      interstim initial      Temporomandibular joint disorders, unspecified      TMJ (dislocation of temporomandibular joint) middle school    Has taken Zanaflex.  Has also had surgeries.     Urinary problem      Past Surgical History:   Procedure Laterality Date     APPENDECTOMY OPEN  2004     MANDIBLE SURGERY  2003, 2006    To treat TMJ (Done by oromaxillary sugeon).     NEURAL STIMULATOR DEVICE      Done by urologist in [facility] to treat constipation.     REMOVE STIMULATOR SACRAL NERVE  10/15/2013    Procedure: REMOVE STIMULATOR SACRAL NERVE;   REPLACEMENT NEURO STIMULATOR BATTERY ;  Surgeon: Deacon Casiano MD;  Location: [facility]     Family History   Problem Relation Age of Onset     Hypertension Mother      Born 1953     Family History Negative Father      Born 1957     Breast Cancer Paternal Grandmother 50     Hypertension Paternal Grandmother      Osteoporosis Paternal Grandmother      Hyperlipidemia Maternal Grandmother      Hypertension Maternal Grandmother      Cerebrovascular Disease Maternal Grandmother 80     Osteoporosis Paternal Aunt      Social History     Social History     Marital status:      Spouse name: Todd Nielson     Number of children: 0     Years of education: N/A     Occupational History     Dietician [employer]     Social History Main Topics     Smoking status: Never Smoker     Smokeless tobacco: Never Used     Alcohol use No      Comment: One drink/week (prior to pregnancy)     Drug use: No     Sexual activity: Yes     Partners: Male     Birth control/ protection: None     Other Topics Concern     Not on file     Social History Narrative    Tries to walk/run occasionally     Current Outpatient Prescriptions   Medication Sig     busPIRone (BUSPAR) 15 MG tablet Take 2 tablets (30 mg) by mouth 2 times daily     butalbital-acetaminophen-caffeine (FIORICET/ESGIC) -40 MG tablet Take 1 tablet by mouth every 4 hours as needed for  migraine     Docosahexaenoic Acid (DHA PO) Take 2 tablets daily.     doxylamine (UNISOM) 25 MG TABS tablet Take 25 mg by mouth At Bedtime     metoclopramide (REGLAN) 5 MG tablet Take 1 tablet (5 mg) by mouth 4 times daily as needed     multivitamin, therapeutic with minerals (MULTI-VITAMIN) TABS tablet Take 1 tablet by mouth daily     ondansetron (ZOFRAN) 4 MG tablet Take 1 tablet (4 mg) by mouth every 8 hours as needed for nausea     Pyridoxine HCl (VITAMIN B6 PO)      sertraline (ZOLOFT) 100 MG tablet Take 1 tablet (100 mg) by mouth daily     FINACEA 15 % FOAM APPLY TO AFFECTED AREAS QD     SUMAtriptan (IMITREX) 100 MG tablet Take 1 tablet (100 mg) by mouth at onset of headache for migraine Reported on 5/17/2017 (Patient not taking: Reported on 11/27/2018)     No current facility-administered medications for this visit.      Allergies   Allergen Reactions     Thimerosal      Thorazine [Chlorpromazine]      Gets panic attacks per pt       Past medical, surgical, social and family history were reviewed and updated in EPIC.    ROS:   12 point review of systems negative other than symptoms noted below.    EXAM:  /58  Wt 152 lb (68.9 kg)  LMP 08/08/2018 (Approximate)  BMI 26.09 kg/m2   BMI: Body mass index is 26.09 kg/(m^2).    EXAM:  Constitutional: Appearance: Well nourished, well developed alert, in no acute distress  Chest:  Respiratory Effort:  Breathing unlabored  Cardiovascular:Heart    Auscultation:  Regular rate, normal rhythm, no murmurs present  Gastrointestinal:  Abdominal Examination:  Abdomen nontender to palpation, tone normal without     rigidity or guarding, no masses present, umbilicus without lesions    Liver and speen:  No hepatomegaly present, liver nontender to palpation    Hernias:  No hernias present    FHTs auscultated at 140.  Skin:  General Inspection:  No rashes present, no lesions present, no areas of  discoloration.  Neurologic/Psychiatric:    Mental Status:  Oriented  X3       Pelvis: not needed.    ASSESSMENT:      ICD-10-CM    1. Screen for STD (sexually transmitted disease) Z11.3 NEISSERIA GONORRHOEA PCR     CHLAMYDIA TRACHOMATIS PCR   2. Migraine without aura and without status migrainosus, not intractable G43.009 butalbital-acetaminophen-caffeine (FIORICET/ESGIC) -40 MG tablet   3. Encounter for supervision of other normal pregnancy in third trimester Z34.83 Non Invasive Prenatal Test Cell Free DNA       PLAN:    Prenatal labs reviewed. She has no questions.    Discussed options for screening for and diagnosis of chromosomal anomalies, including first trimester screen, noninvasive prenatal testing/cell-free fetal DNA testing, CVS/amniocentesis, quad screen, and ultrasound or comprehensive Level II US at 18-20 weeks. She is electing noninvasive prenatal testing.    Reviewed early pregnancy education, diet, exercise, prenatal vitamins, intercourse. Reviewed the call schedule, labor and delivery, and the schedule of prenatal visits.    Will try reglan at the onset of headache, fioricet if needed, and start magnesium supplement daily. Follow up as needed.    Follow up in 4 weeks. She is encouraged to call sooner with questions or concerns.      June Jones MD

## 2018-11-28 LAB
C TRACH DNA SPEC QL NAA+PROBE: NEGATIVE
N GONORRHOEA DNA SPEC QL NAA+PROBE: NEGATIVE
SPECIMEN SOURCE: NORMAL
SPECIMEN SOURCE: NORMAL

## 2018-12-03 ENCOUNTER — TELEPHONE (OUTPATIENT)
Dept: OBGYN | Facility: CLINIC | Age: 33
End: 2018-12-03

## 2018-12-03 NOTE — TELEPHONE ENCOUNTER
Results received from [facility] testing in [facility].  Testing done:  Innatal Prenatal Screen    Action:  Spoke to pt and gave NORMAL results.    Gender: **GIRL**  Gender revealed via letter for pt to .    Routed to provider as LEE BARNES RN

## 2018-12-25 ENCOUNTER — NURSE TRIAGE (OUTPATIENT)
Dept: NURSING | Facility: CLINIC | Age: 33
End: 2018-12-25

## 2018-12-25 NOTE — TELEPHONE ENCOUNTER
Reason for call; From [phone]  [phone] Pt called.   2nd pregnancy =17 weeks pregnant, ERENDRIA 5/31/19 and followed by Dr amirah Jones at [facility] .  Seen at  [facility]  ED for vaginal Bleeding this morning @ 3am   today . Reviewed discharge instructions  And Pt will move up appt ASAP but doesn't meet criteria for returning to ED . And will call back if further problems and wants provider paged.        Caller Verbalizes understanding and denies further questions and will call back if further symptoms to triage or questions  .  Amy Marin RN  - [facility]

## 2018-12-27 ENCOUNTER — PRENATAL OFFICE VISIT (OUTPATIENT)
Dept: OBGYN | Facility: CLINIC | Age: 33
End: 2018-12-27
Payer: COMMERCIAL

## 2018-12-27 ENCOUNTER — MYC MEDICAL ADVICE (OUTPATIENT)
Dept: OBGYN | Facility: CLINIC | Age: 33
End: 2018-12-27

## 2018-12-27 VITALS — BODY MASS INDEX: 26.95 KG/M2 | SYSTOLIC BLOOD PRESSURE: 122 MMHG | DIASTOLIC BLOOD PRESSURE: 60 MMHG | WEIGHT: 157 LBS

## 2018-12-27 DIAGNOSIS — O46.92 VAGINAL BLEEDING IN PREGNANCY, SECOND TRIMESTER: ICD-10-CM

## 2018-12-27 DIAGNOSIS — O46.92 VAGINAL BLEEDING IN PREGNANCY, SECOND TRIMESTER: Primary | ICD-10-CM

## 2018-12-27 DIAGNOSIS — Z34.82 ENCOUNTER FOR SUPERVISION OF OTHER NORMAL PREGNANCY IN SECOND TRIMESTER: ICD-10-CM

## 2018-12-27 PROCEDURE — 99207 ZZC PRENATAL VISIT: CPT | Performed by: OBSTETRICS & GYNECOLOGY

## 2018-12-27 NOTE — PROGRESS NOTES
"Was into ED in [place] over the holiday with vaginal bleeding.  Was bright red bleeding, light, no pain. Now just brown discharge. Has not felt fetal movement. No cramping, no recent intercourse. Per US report in [facility], placenta was anterior and \"borderline low lying\". No measurement given, no TVUS, but notes \"no previa.\"    /60   Wt 71.2 kg (157 lb)   LMP 08/08/2018 (Approximate)   Breastfeeding? No   BMI 26.95 kg/m    Abdomen: soft, nontender, normal FHTs.  Pelvis: on spec exam, cervix is long, closed. Brown discharge present, no active bleeding, no lesions.    A/P:  Pregnancy at 17w6d  US ordered for anatomy and placental location.  Instructions for pelvic rest, no heavy activity until after her US is done.  Follow up for bleeding more than discharge or spotting, or for any concerns.    June Jones MD      "

## 2018-12-30 NOTE — TELEPHONE ENCOUNTER
10.2 isn't too low in pregnancy--extra iron as she indicated would be okay, but she could also just stay on the prenatal and pay attention to iron rich foods in the diet.    June Jones MD

## 2019-01-03 DIAGNOSIS — Z34.82 ENCOUNTER FOR SUPERVISION OF OTHER NORMAL PREGNANCY IN SECOND TRIMESTER: ICD-10-CM

## 2019-01-04 ENCOUNTER — NURSE TRIAGE (OUTPATIENT)
Dept: NURSING | Facility: CLINIC | Age: 34
End: 2019-01-04

## 2019-01-05 NOTE — TELEPHONE ENCOUNTER
"Patient is 19 weeks pregnant and states is having a panic attack.  States is up to date with prenatal care and that is going \"fine.\"  2nd pregnancy. Reports severe anxiety with last pregnancy.   Describes waking up past 3-4 nights very anxious with shortness of breath and palpitations.   States does not see a therapist.     Currently very distraught caller.   Caller sounds hysterical during parts of  conversation.   Crying and states she is going to end the call as \"it's not helping.\" States  is with her now.   States \"I feel on the edge.\"  Denies thoughts of self harm or harm to others. Denies having a plan for self harm.  States is fearful of going to sleep.     Protocol-  Anxiety and Panic Attack- Adult  Care advice reviewed.   Disposition- Go to the ED now   Caller states understanding of the recommended disposition.   Advised to be seen at [place] ED. Patient states she does not plan to go. States she will call Dr. Jones on Monday (1/7/18)   This RN again advised her to seek support now in ED setting.   Advised to call back if further questions or concerns.     ORESTES Hairston RN  [facility]         Reason for Disposition    Patient sounds very sick or weak to the triager    Additional Information    Negative: Severe difficulty breathing (e.g., struggling for each breath, speaks in single words)    Negative: Bluish lips, tongue, or face now    Negative: Difficult to awaken or acting confused  (e.g., disoriented, slurred speech)    Negative: Hysterical or combative behavior    Negative: Sounds like a life-threatening emergency to the triager    Negative: [1] Difficulty breathing AND [2] persists > 10 minutes AND [3] not relieved by reassurance provided by triager    Negative: [1] Lightheadedness or dizziness AND [2] persists > 10 minutes AND [3] not relieved by reassurance provided by triager    Negative: Taking medication containing theophylline (e.g., Theodur)    Negative: [1] Known or " suspected alcohol or drug abuse AND [2] feeling very shaky (i.e., visible tremors of hands)    Negative: Depression is main problem or symptom (e.g., feelings of sadness or hopelessness)    Negative: Suicide thoughts, threats, attempts, or questions    Negative: Palpitations, skipped heart beat, or rapid heart beat    Negative: Chest pain    Protocols used: ANXIETY AND PANIC ATTACK-ADULT-AH

## 2019-01-11 DIAGNOSIS — E55.9 VITAMIN D DEFICIENCY: ICD-10-CM

## 2019-01-11 PROCEDURE — 82306 VITAMIN D 25 HYDROXY: CPT | Performed by: OBSTETRICS & GYNECOLOGY

## 2019-01-11 PROCEDURE — 36415 COLL VENOUS BLD VENIPUNCTURE: CPT | Performed by: OBSTETRICS & GYNECOLOGY

## 2019-01-13 LAB — DEPRECATED CALCIDIOL+CALCIFEROL SERPL-MC: 70 UG/L (ref 20–75)

## 2019-01-22 ENCOUNTER — OFFICE VISIT (OUTPATIENT)
Dept: INTERNAL MEDICINE | Facility: CLINIC | Age: 34
End: 2019-01-22
Payer: COMMERCIAL

## 2019-01-22 ENCOUNTER — TELEPHONE (OUTPATIENT)
Dept: INTERNAL MEDICINE | Facility: CLINIC | Age: 34
End: 2019-01-22

## 2019-01-22 VITALS
BODY MASS INDEX: 26.8 KG/M2 | SYSTOLIC BLOOD PRESSURE: 118 MMHG | TEMPERATURE: 98 F | OXYGEN SATURATION: 99 % | HEART RATE: 91 BPM | RESPIRATION RATE: 16 BRPM | HEIGHT: 64 IN | WEIGHT: 157 LBS | DIASTOLIC BLOOD PRESSURE: 58 MMHG

## 2019-01-22 DIAGNOSIS — Z33.1 PREGNANCY, INCIDENTAL: ICD-10-CM

## 2019-01-22 DIAGNOSIS — F41.9 ANXIETY: Primary | ICD-10-CM

## 2019-01-22 DIAGNOSIS — F41.9 ANXIETY: ICD-10-CM

## 2019-01-22 PROCEDURE — 99213 OFFICE O/P EST LOW 20 MIN: CPT | Performed by: NURSE PRACTITIONER

## 2019-01-22 RX ORDER — BUSPIRONE HYDROCHLORIDE 15 MG/1
30 TABLET ORAL 2 TIMES DAILY
Qty: 460 TABLET | Refills: 3 | Status: SHIPPED | OUTPATIENT
Start: 2019-01-22 | End: 2019-01-24

## 2019-01-22 RX ORDER — FOLIC ACID 0.8 MG
TABLET ORAL AT BEDTIME
COMMUNITY
End: 2019-06-27

## 2019-01-22 ASSESSMENT — MIFFLIN-ST. JEOR: SCORE: 1402.15

## 2019-01-22 NOTE — PROGRESS NOTES
SUBJECTIVE:   Seema Nielson is a 33 year old female who presents to clinic today for the following health issues:    Increased anxiety and panic attacks during pregnancy.  21 weeks gestation  Continues on Buspar and Zoloft, using unisom OTC at .  Open to psych med management.            Problem list and histories reviewed & adjusted, as indicated.  Additional history: as documented    Patient Active Problem List   Diagnosis     TMJ (dislocation of temporomandibular joint)     Constipation     RHETT (generalized anxiety disorder)     Irregular menses     Migraine without aura and without status migrainosus, not intractable     Anxiety     Past Surgical History:   Procedure Laterality Date     APPENDECTOMY OPEN  2004     MANDIBLE SURGERY  2003, 2006    To treat TMJ (Done by oromaxillary sugeon).     NEURAL STIMULATOR DEVICE      Done by urologist in [facility] to treat constipation.     REMOVE STIMULATOR SACRAL NERVE  10/15/2013    Procedure: REMOVE STIMULATOR SACRAL NERVE;   REPLACEMENT NEURO STIMULATOR BATTERY ;  Surgeon: Deacon Casiano MD;  Location: [facility]       Social History     Tobacco Use     Smoking status: Never Smoker     Smokeless tobacco: Never Used   Substance Use Topics     Alcohol use: No     Alcohol/week: 0.0 oz     Comment: One drink/week (prior to pregnancy)     Family History   Problem Relation Age of Onset     Hypertension Mother         Born 1953     Family History Negative Father         Born 1957     Breast Cancer Paternal Grandmother 50     Hypertension Paternal Grandmother      Osteoporosis Paternal Grandmother      Hyperlipidemia Maternal Grandmother      Hypertension Maternal Grandmother      Cerebrovascular Disease Maternal Grandmother 80     Osteoporosis Paternal Aunt          Current Outpatient Medications   Medication Sig Dispense Refill     busPIRone (BUSPAR) 15 MG tablet Take 2 tablets (30 mg) by mouth 2 times daily 460 tablet 3     Docosahexaenoic Acid (DHA PO) Take 2  "tablets daily.       Docusate Sodium (COLACE PO) Take by mouth 2 times daily       doxylamine (UNISOM) 25 MG TABS tablet Take 25 mg by mouth At Bedtime       Magnesium 500 MG CAPS Take by mouth At Bedtime       metoclopramide (REGLAN) 5 MG tablet Take 1 tablet (5 mg) by mouth 4 times daily as needed 30 tablet 1     multivitamin, therapeutic with minerals (MULTI-VITAMIN) TABS tablet Take 1 tablet by mouth daily       sertraline (ZOLOFT) 100 MG tablet Take 1 tablet (100 mg) by mouth daily 90 tablet 3     butalbital-acetaminophen-caffeine (FIORICET/ESGIC) -40 MG tablet Take 1 tablet by mouth every 4 hours as needed for migraine (Patient not taking: Reported on 1/22/2019) 28 tablet 1     FINACEA 15 % FOAM APPLY TO AFFECTED AREAS QD  11     ondansetron (ZOFRAN) 4 MG tablet Take 1 tablet (4 mg) by mouth every 8 hours as needed for nausea (Patient not taking: Reported on 1/22/2019) 30 tablet 3     Pyridoxine HCl (VITAMIN B6 PO)        SUMAtriptan (IMITREX) 100 MG tablet Take 1 tablet (100 mg) by mouth at onset of headache for migraine Reported on 5/17/2017 (Patient not taking: Reported on 11/27/2018) 30 tablet 3     BP Readings from Last 3 Encounters:   01/22/19 118/58   12/27/18 122/60   11/27/18 118/58    Wt Readings from Last 3 Encounters:   01/22/19 71.2 kg (157 lb)   12/27/18 71.2 kg (157 lb)   11/27/18 68.9 kg (152 lb)                    Reviewed and updated as needed this visit by clinical staff  Tobacco  Allergies  Meds  Med Hx  Surg Hx  Fam Hx  Soc Hx      Reviewed and updated as needed this visit by Provider         ROS:  Constitutional, HEENT, cardiovascular, pulmonary, gi and gu systems are negative, except as otherwise noted.    OBJECTIVE:     /58 (BP Location: Right arm, Patient Position: Sitting, Cuff Size: Adult Large)   Pulse 91   Temp 98  F (36.7  C) (Oral)   Resp 16   Ht 1.626 m (5' 4\")   Wt 71.2 kg (157 lb)   LMP 08/08/2018 (Approximate)   SpO2 99%   Breastfeeding? No   BMI " 26.95 kg/m    Body mass index is 26.95 kg/m .  GENERAL: healthy, alert and no distress  PSYCH: mentation appears normal, affect normal/bright        ASSESSMENT/PLAN:               ICD-10-CM    1. Anxiety F41.9 MENTAL HEALTH REFERRAL  - Adult; Psychiatry and Medication Management; Psychiatry; [facility]: [facility] [phone].  Medication management & future refills will be returned to [facility] PCP upon completion of evaluation; We jadiel...   2. Pregnancy, incidental Z33.1 MENTAL HEALTH REFERRAL  - Adult; Psychiatry and Medication Management; Psychiatry; [facility]: [facility] [phone].  Medication management & future refills will be returned to [facility] PCP upon completion of evaluation; We jadiel...       Plan to discuss with Deisi Castellanos NP, for any recommendations until pt able to see her for OV.  Will contact pt with these recommendations.    A total of 15 minutes was spent with the patient today, with greater than 50% of the visit involving counseling and coordination of care.      Pauline Wang NP  [facility]

## 2019-01-23 ENCOUNTER — TELEPHONE (OUTPATIENT)
Dept: INTERNAL MEDICINE | Facility: CLINIC | Age: 34
End: 2019-01-23

## 2019-01-23 DIAGNOSIS — F41.9 ANXIETY: Primary | ICD-10-CM

## 2019-01-23 RX ORDER — BUSPIRONE HYDROCHLORIDE 30 MG/1
30 TABLET ORAL 2 TIMES DAILY
Qty: 180 TABLET | Refills: 3 | Status: SHIPPED | OUTPATIENT
Start: 2019-01-23 | End: 2019-09-11

## 2019-01-23 NOTE — TELEPHONE ENCOUNTER
Fax received from [facility] in [facility] on [address] requesting new RX for Burpirone for 30MG.  The 15MG is on low supply/backorder.

## 2019-01-23 NOTE — LETTER
[facility]  [address], [address]  [place], [place]  [address]                                            TEL:[phone]  FAX:[phone]      Seema Nielson  [facility] [facility] [address]      February 11, 2019    To whom it may concern,    Please consider this letter an appeal of medical necessity for Zoloft 150 mg for my patient, Seema Nielson.  She requires a higher dose due to increased anxiety during her current pregnancy.          Sincerely,      Pauline Wang C.N.P.

## 2019-01-23 NOTE — TELEPHONE ENCOUNTER
Prior Authorization Retail Medication Request    Medication/Dose: Sertraline  ICD code (if different than what is on RX):  Anxiety [F41.9]   Previously Tried and Failed:  unknown  Rationale:  unknown    Insurance Name:  Plan Telephone [phone]  Insurance ID:  [id number]      Pharmacy Information (if different than what is on RX)  Name:  [facility]  Phone:  [phone]

## 2019-01-24 ENCOUNTER — TELEPHONE (OUTPATIENT)
Dept: INTERNAL MEDICINE | Facility: CLINIC | Age: 34
End: 2019-01-24

## 2019-01-24 ENCOUNTER — PRENATAL OFFICE VISIT (OUTPATIENT)
Dept: OBGYN | Facility: CLINIC | Age: 34
End: 2019-01-24
Payer: COMMERCIAL

## 2019-01-24 ENCOUNTER — MYC MEDICAL ADVICE (OUTPATIENT)
Dept: INTERNAL MEDICINE | Facility: CLINIC | Age: 34
End: 2019-01-24

## 2019-01-24 VITALS — WEIGHT: 159 LBS | SYSTOLIC BLOOD PRESSURE: 120 MMHG | BODY MASS INDEX: 27.29 KG/M2 | DIASTOLIC BLOOD PRESSURE: 78 MMHG

## 2019-01-24 DIAGNOSIS — Z34.83 ENCOUNTER FOR SUPERVISION OF OTHER NORMAL PREGNANCY IN THIRD TRIMESTER: Primary | ICD-10-CM

## 2019-01-24 PROCEDURE — 99207 ZZC PRENATAL VISIT: CPT | Performed by: OBSTETRICS & GYNECOLOGY

## 2019-01-24 NOTE — NURSING NOTE
"Chief Complaint   Patient presents with     Prenatal Care       Initial /78   Wt 72.1 kg (159 lb)   LMP 2018 (Approximate)   Breastfeeding? No   BMI 27.29 kg/m   Estimated body mass index is 27.29 kg/m  as calculated from the following:    Height as of 19: 1.626 m (5' 4\").    Weight as of this encounter: 72.1 kg (159 lb).  BP completed using cuff size: regular    Questioned patient about current smoking habits.  Pt. has never smoked.          21w6d  + FM noted  + occas. Headache  - heartburn  - cramping or bleeding  Ana María Ordaz LPN               "

## 2019-01-24 NOTE — PROGRESS NOTES
PROBLEM LIST  LABS: Opos/RI    1. Low lying placenta (1.9 cm): plan repeat at 28w. Pelvic rest. One episode 2TM bleeding.  2. Anxiety: on zoloft, buspar. Has follow up with mental health as well.    Anxiety has been increasing, as it did in her prior pregnancy. Plan is to increase Zoloft to 150 mg per her primary, and she is also following up with psych. Discussed in detail today. Her migraines are almost completely resolved. Good fetal movement, no vaginal bleeding. Discussed follow up ultrasound at 28-32 weeks.    June Jones MD

## 2019-01-24 NOTE — TELEPHONE ENCOUNTER
pt is calling and wasnt able to tell me what she wanted    I mentioned meds that are in process but she doesnt know if thats her question??  Wanted to speak to Pauline Burnett team

## 2019-01-28 NOTE — TELEPHONE ENCOUNTER
This PA request was submitted to the insurance by the Central Prior Authorization Team.  If you have any questions in regards to this request, we can be reached at [phone].     Thanks    PA Initiation    Medication: sertraline (ZOLOFT) 50 MG tablet  Insurance Company: [employer] [place] - Phone [phone] Fax [phone]  Pharmacy Filling the Rx: [id number] [address] [place] [address] - [address] [place] [address] [address] AT [facility] & [facility]  Filling Pharmacy Phone: [phone]  Filling Pharmacy Fax:    Start Date: 1/28/2019

## 2019-01-29 NOTE — TELEPHONE ENCOUNTER
PRIOR AUTHORIZATION DENIED    Medication: sertraline (ZOLOFT) 50 MG tablet - DENIED     Denial Date: 1/29/2019    Denial Rational:           Appeal Information: Please provide a letter of medical necessity

## 2019-02-05 NOTE — TELEPHONE ENCOUNTER
Routed to provider to review if appeal should be submitted for patient or not. Patient saw Pauline for increased anxiety during pregnancy on 1/22/19 - routed to Pauline to review.

## 2019-02-06 NOTE — TELEPHONE ENCOUNTER
Routed to PA dept to initiate an appeal.    Please see Pauline Wang's message below--states patient is currently pregnant and choices are limited.

## 2019-02-11 NOTE — TELEPHONE ENCOUNTER
Medication Appeal Initiation    We have initiated an appeal for the requested medication:  Medication: sertraline (ZOLOFT) 50 MG tablet - DENIED   Appeal Start Date:  2/11/2019  Insurance Company: [employer] [place] - Phone [phone] Fax [phone]  Comments:  Appeal initiated on 02/11/2019; letter of medical necessity faxed to insurance     * Please note that appeals can take up to 30 days*

## 2019-02-19 ENCOUNTER — PRENATAL OFFICE VISIT (OUTPATIENT)
Dept: OBGYN | Facility: CLINIC | Age: 34
End: 2019-02-19
Payer: COMMERCIAL

## 2019-02-19 VITALS — SYSTOLIC BLOOD PRESSURE: 122 MMHG | DIASTOLIC BLOOD PRESSURE: 60 MMHG | BODY MASS INDEX: 27.81 KG/M2 | WEIGHT: 162 LBS

## 2019-02-19 DIAGNOSIS — Z34.80 SUPERVISION OF OTHER NORMAL PREGNANCY, ANTEPARTUM: Primary | ICD-10-CM

## 2019-02-19 PROCEDURE — 99207 ZZC PRENATAL VISIT: CPT | Performed by: OBSTETRICS & GYNECOLOGY

## 2019-02-19 NOTE — PROGRESS NOTES
PROBLEM LIST  LABS: Opos/RI    1. Low lying placenta (1.9 cm): plan repeat at 28w. Pelvic rest. One episode 2TM bleeding.  2. Anxiety: on zoloft, buspar. Has follow up with mental health as well.    Anxiety has been decreasing overall, has used 0.5 mg Ativan only twice this pregnancy. Her migraines are almost completely resolved. Good fetal movement, no vaginal bleeding. Discussed follow up ultrasound at 28-32 weeks (ordered today).    June Jones MD

## 2019-02-19 NOTE — NURSING NOTE
"Chief Complaint   Patient presents with     Prenatal Care     25 weeks 4 days- no concerns        Initial /60 (BP Location: Right arm, Patient Position: Sitting, Cuff Size: Adult Regular)   Wt 73.5 kg (162 lb)   LMP 2018 (Approximate)   BMI 27.81 kg/m   Estimated body mass index is 27.81 kg/m  as calculated from the following:    Height as of 19: 1.626 m (5' 4\").    Weight as of this encounter: 73.5 kg (162 lb).  BP completed using cuff size: regular    Questioned patient about current smoking habits.  Pt. has never smoked.          The following HM Due: NONE    +FM    25w4d  Kody Streeter CMA                "

## 2019-02-23 ASSESSMENT — ANXIETY QUESTIONNAIRES
1. FEELING NERVOUS, ANXIOUS, OR ON EDGE: SEVERAL DAYS
2. NOT BEING ABLE TO STOP OR CONTROL WORRYING: NOT AT ALL
4. TROUBLE RELAXING: NOT AT ALL
5. BEING SO RESTLESS THAT IT IS HARD TO SIT STILL: NOT AT ALL
3. WORRYING TOO MUCH ABOUT DIFFERENT THINGS: NOT AT ALL
7. FEELING AFRAID AS IF SOMETHING AWFUL MIGHT HAPPEN: NOT AT ALL
GAD7 TOTAL SCORE: 2
7. FEELING AFRAID AS IF SOMETHING AWFUL MIGHT HAPPEN: NOT AT ALL
GAD7 TOTAL SCORE: 2
GAD7 TOTAL SCORE: 2
6. BECOMING EASILY ANNOYED OR IRRITABLE: SEVERAL DAYS

## 2019-02-23 ASSESSMENT — PATIENT HEALTH QUESTIONNAIRE - PHQ9
10. IF YOU CHECKED OFF ANY PROBLEMS, HOW DIFFICULT HAVE THESE PROBLEMS MADE IT FOR YOU TO DO YOUR WORK, TAKE CARE OF THINGS AT HOME, OR GET ALONG WITH OTHER PEOPLE: NOT DIFFICULT AT ALL
SUM OF ALL RESPONSES TO PHQ QUESTIONS 1-9: 0
SUM OF ALL RESPONSES TO PHQ QUESTIONS 1-9: 0

## 2019-02-24 ASSESSMENT — PATIENT HEALTH QUESTIONNAIRE - PHQ9: SUM OF ALL RESPONSES TO PHQ QUESTIONS 1-9: 0

## 2019-02-24 ASSESSMENT — ANXIETY QUESTIONNAIRES: GAD7 TOTAL SCORE: 2

## 2019-02-25 NOTE — PROGRESS NOTES
"                                                         Outpatient Psychiatric Evaluation- Standard  Adult    Name:  Seema Nielson  : 1985    Source of Referral:  Primary Care Provider: Tay Lopes MD - last visit 9/15/2016  Referred by Pauline Wang NP - last visit 2019  Current Psychotherapist: None  OB/GYN Dr. June Jones MD - last visit 2019    Identifying Data:  Patient is a 33 year old,   White American female  who presents for initial visit with me.  Patient is currently employed part time as diabetic educator. Patient attended the session alone. Consent to communicate signed for aYkov Nielson, patient's Spouse/Partner. Consent for treatment signed and included in electronic medical record. Discussed limits of confidentiality today. My Practice Policy was reviewed and signed.     Chief Complaint:  Consultation.  Patient reports: \"medication review... I don't really know\"  Patient prefers to be called: \"Seema\"    HPI:  Patient notes that she has increased anxiety due to current pregnancy.  Patient has had two panic attacks in the last two months. History of psychiatry in high school and early college. No history of substance use, but struggled with emotional lability at that time. Was hospitalized. Has been on Zoloft (sertraline) and Buspar (buspirone) since college. Panic attacks in middle of the night at week 30 in first pregnancy. No with this pregnancy, these started this pregnancy at 19 weeks. Is 26 weeks now. Has used 0.5 mg of Ativan (lorazepam) twice this pregnancy when had panic. Tried to get off of medication during first pregnancy and did not work well for her. During the last few weeks, has also increased exercise and using less caffeine.     Past diagnoses include: Anxiety  Current medications include: Buspar (buspirone), Zoloft (sertraline)    Medication side effects: Denies  Current stressors include: Pregnancy, Parenting Stress  Coping mechanisms and " "supports include: , Job, Exercise    Answers for HPI/ROS submitted by the patient on 2/23/2019   If you checked off any problems, how difficult have these problems made it for you to do your work, take care of things at home, or get along with other people?: Not difficult at all  PHQ9 TOTAL SCORE: 0  RHETT 7 TOTAL SCORE: 2    Psychiatric Review of Symptoms:  Depression: No symptoms   PHQ-9 scores:   PHQ-9 SCORE 6/9/2016 2/23/2019   PHQ-9 Total Score 0 0     Rafia:  No symptoms   MDQ Score: Negative Screen  Anxiety: Feeling nervous, anxious, or on edge    Easily annoyed or irritable    RHETT-7 scores:    RHETT-7 SCORE 6/9/2016 10/4/2017 2/23/2019   Total Score 2 0 2     Panic:  At bedtime panic during pregnancy  Agoraphobia:  No   PTSD:  No symptoms   OCD:  No symptoms   Psychosis: No symptoms   ADD / ADHD: No symptoms  Gambling or shoplifting: No   Eating Disorder:  No symptoms  Sleep:   No symptoms     Psychiatric History:   Hospitalizations: [place] x 5 in high school due to extreme behavioral outbursts.   History of Commitment? No   Past Treatment: counseling, inpatient mental health services, physician / PCP, medication(s) from physician / PCP and psychiatry  Suicide Attempts: No   Current Suicide Risk:  Suicide Assessment Completed Today.  Self-injurious Behavior: Denies  Electroconvulsive Therapy (ECT) or Transcranial Magnetic Stimulation (TMS): No   GeneSight Genetic Testing: No     Past medication trials include but are not limited to:   Zoloft (sertraline)  Buspar (buspirone)   Elavil (amitriptyline)   Metoprolol   Inderal (propranolol) for migraine prevention  Celexa (citalopram)  Cymbalta (duloxetine)   Lexapro (escitalopram)   Paxil (paroxetine)   Remeron (mirtazapine)   Wellbutrin (buproprion)   Thorazine (chlopromazine) \"allergy\" - for migraine use in Emergency Department   Ativan (lorazepam)   Seroquel (quetiapine) in college for sleep and anxiety and did not like this  Unisom     Substance Use " History:  Current use of drugs or alcohol: Denies   Patient reports no problems as a result of their drinking / drug use.   Based on the clinical interview, there  are not indications of drug or alcohol abuse.  CAGE-AID Score today was: 0  Tobacco use: No  Caffeine:  Yes  6 ounces per day  Patient has not received chemical dependency treatment in the past  Recovery Programming Involvement: Not Applicable and None    Past Medical History:  Past Medical History:   Diagnosis Date     Abnormal Pap smear of cervix 2004    Treated with Cryo.  Normal paps since then.  Last pap 4/2016     Anxiety state, unspecified     On Zoloft, well controlled. Managed by Dr. Zhu/Marianne     Battery end of life of spinal cord stimulator      Constipation      Constipation      H/O: depression      History of vaccination against human papillomavirus      Migraine, unspecified, without mention of intractable migraine without mention of status migrainosus     Managed by Dr. Zhu/Marianne     OAB (overactive bladder)     interstim initial      Temporomandibular joint disorders, unspecified      TMJ (dislocation of temporomandibular joint) middle school    Has taken Zanaflex.  Has also had surgeries.     Urinary problem       Surgery:   Past Surgical History:   Procedure Laterality Date     APPENDECTOMY OPEN  2004     MANDIBLE SURGERY  2003, 2006    To treat TMJ (Done by oromaxillary sugeon).     NEURAL STIMULATOR DEVICE      Done by urologist in [facility] to treat constipation.     REMOVE STIMULATOR SACRAL NERVE  10/15/2013    Procedure: REMOVE STIMULATOR SACRAL NERVE;   REPLACEMENT NEURO STIMULATOR BATTERY ;  Surgeon: Deacon Casiano MD;  Location: [facility]     Food and Medicine Allergies:     Allergies   Allergen Reactions     Thimerosal      Thorazine [Chlorpromazine]      Gets panic attacks per pt     Primary Care Provider: Tay Lopes MD  Seizures or Head Injury: No  Diet: No Restrictions  Exercise: elliptical  Supplements:  Reviewed per Electronic Medical Record Today    Current Medications:    Current Outpatient Medications:      busPIRone HCl (BUSPAR) 30 MG tablet, Take 1 tablet (30 mg) by mouth 2 times daily, Disp: 180 tablet, Rfl: 3     butalbital-acetaminophen-caffeine (FIORICET/ESGIC) -40 MG tablet, Take 1 tablet by mouth every 4 hours as needed for migraine, Disp: 28 tablet, Rfl: 1     Docosahexaenoic Acid (DHA PO), Take 2 tablets daily., Disp: , Rfl:      Docusate Sodium (COLACE PO), Take by mouth 2 times daily, Disp: , Rfl:      doxylamine (UNISOM) 25 MG TABS tablet, Take 25 mg by mouth At Bedtime, Disp: , Rfl:      Magnesium 500 MG CAPS, Take by mouth At Bedtime, Disp: , Rfl:      metoclopramide (REGLAN) 5 MG tablet, Take 1 tablet (5 mg) by mouth 4 times daily as needed, Disp: 30 tablet, Rfl: 1     multivitamin, therapeutic with minerals (MULTI-VITAMIN) TABS tablet, Take 1 tablet by mouth daily, Disp: , Rfl:      ondansetron (ZOFRAN) 4 MG tablet, Take 1 tablet (4 mg) by mouth every 8 hours as needed for nausea, Disp: 30 tablet, Rfl: 3     SUMAtriptan (IMITREX) 100 MG tablet, Take 1 tablet (100 mg) by mouth at onset of headache for migraine Reported on 5/17/2017, Disp: 30 tablet, Rfl: 3     sertraline (ZOLOFT) 100 MG tablet, Take 1 tablet (100 mg) by mouth daily (Patient taking differently: Take 100 mg by mouth daily ), Disp: 90 tablet, Rfl: 3    The [place] Prescription Monitoring Program has been reviewed and there are no concerns about diversionary activity for controlled substances at this time.      PRESCRIPTIONS  Total Prescriptions: 1  Total Private Pay: 0  Fill Date ID Written Drug Qty Days Prescriber Rx # Pharmacy Refill Daily Dose * Pymt Type   11/28/2018 1 11/27/2018 Enkcyb-Qqovguru-Thct -40 28 5 Me Deena [id number] [facility] 0 2.80 LME Comm Ins [place]      Vital Signs:  Vitals: BP 98/60 (BP Location: Left arm, Patient Position: Chair, Cuff Size: Adult Regular)   Pulse 74   Temp 98.1  F (36.7  C) (Oral)   " Resp 18   Ht 1.626 m (5' 4\")   Wt 75.3 kg (166 lb)   LMP 08/08/2018 (Approximate)   SpO2 99%   BMI 28.49 kg/m      Labs:  Most recent laboratory results reviewed and the pertinent results include:   Orders Only on 01/11/2019   Component Date Value Ref Range Status     Vitamin D Deficiency screening 01/11/2019 70  20 - 75 ug/L Final    Comment: Season, race, dietary intake, and treatment affect the concentration of   25-hydroxy-Vitamin D. Values may decrease during winter months and increase   during summer months. Values 20-29 ug/L may indicate Vitamin D insufficiency   and values <20 ug/L may indicate Vitamin D deficiency.  Vitamin D determination is routinely performed by an immunoassay specific for   25 hydroxyvitamin D3.  If an individual is on vitamin D2 (ergocalciferol)   supplementation, please specify 25 OH vitamin D2 and D3 level determination by   LCMSMS test [id number].     Prenatal Office Visit on 11/27/2018   Component Date Value Ref Range Status     Specimen Descrip 11/27/2018 Urine   Final     N Gonorrhea PCR 11/27/2018 Negative  NEG^Negative Final    Comment: Negative for N. gonorrhoeae rRNA by transcription mediated amplification.  A negative result by transcription mediated amplification does not preclude   the presence of N. gonorrhoeae infection because results are dependent on   proper and adequate collection, absence of inhibitors, and sufficient rRNA to   be detected.       Specimen Description 11/27/2018 Urine   Final     Chlamydia Trachomatis PCR 11/27/2018 Negative  NEG^Negative Final    Comment: Negative for C. trachomatis rRNA by transcription mediated amplification.  A negative result by transcription mediated amplification does not preclude   the presence of C. trachomatis infection because results are dependent on   proper and adequate collection, absence of inhibitors, and sufficient rRNA to   be detected.     Prenatal Office Visit on 10/29/2018   Component Date Value Ref Range " Status     WBC 10/29/2018 6.6  4.0 - 11.0 10e9/L Final     RBC Count 10/29/2018 3.87  3.8 - 5.2 10e12/L Final     Hemoglobin 10/29/2018 11.8  11.7 - 15.7 g/dL Final     Hematocrit 10/29/2018 35.0  35.0 - 47.0 % Final     MCV 10/29/2018 90  78 - 100 fl Final     MCH 10/29/2018 30.5  26.5 - 33.0 pg Final     MCHC 10/29/2018 33.7  31.5 - 36.5 g/dL Final     RDW 10/29/2018 12.5  10.0 - 15.0 % Final     Platelet Count 10/29/2018 191  150 - 450 10e9/L Final     ABO 10/29/2018 O   Final     RH(D) 10/29/2018 Pos   Final     Antibody Screen 10/29/2018 Neg   Final     Test Valid Only At 10/29/2018 [facility]      Final     Specimen Expires 10/29/2018 11/01/2018   Final     Hep B Surface Agn 10/29/2018 Nonreactive  NR^Nonreactive Final     HIV Antigen Antibody Combo 10/29/2018 Nonreactive  NR^Nonreactive     Final    HIV-1 p24 Ag & HIV-1/HIV-2 Ab Not Detected     Rubella Antibody IgG Quantitative 10/29/2018 14  IU/mL Final    Comment: Positive.  Suggests previous exposure or immunization and probable immunity  Reference Range:    Unvaccinated Negative 0-7 IU/mL  Vaccinated or previous exposure Positive 10 IU/ml or greater       Treponema Antibodies 10/29/2018 Nonreactive  NR^Nonreactive Final     Specimen Description 10/29/2018 Midstream Urine   Final     Culture Micro 10/29/2018    Final                    Value:<10,000 colonies/mL  urogenital gwendolyn  Susceptibility testing not routinely done     Office Visit on 06/20/2018   Component Date Value Ref Range Status     WBC 06/20/2018 4.6  4.0 - 11.0 10e9/L Final     RBC Count 06/20/2018 4.69  3.8 - 5.2 10e12/L Final     Hemoglobin 06/20/2018 14.0  11.7 - 15.7 g/dL Final     Hematocrit 06/20/2018 41.7  35.0 - 47.0 % Final     MCV 06/20/2018 89  78 - 100 fl Final     MCH 06/20/2018 29.9  26.5 - 33.0 pg Final     MCHC 06/20/2018 33.6  31.5 - 36.5 g/dL Final     RDW 06/20/2018 12.2  10.0 - 15.0 % Final     Platelet Count 06/20/2018 185  150 - 450 10e9/L Final     Sodium  06/20/2018 139  133 - 144 mmol/L Final     Potassium 06/20/2018 4.1  3.4 - 5.3 mmol/L Final     Chloride 06/20/2018 104  94 - 109 mmol/L Final     Carbon Dioxide 06/20/2018 30  20 - 32 mmol/L Final     Anion Gap 06/20/2018 5  3 - 14 mmol/L Final     Glucose 06/20/2018 80  70 - 99 mg/dL Final    Fasting specimen     Urea Nitrogen 06/20/2018 19  7 - 30 mg/dL Final     Creatinine 06/20/2018 0.93  0.52 - 1.04 mg/dL Final     GFR Estimate 06/20/2018 69  >60 mL/min/1.7m2 Final    Non  GFR Calc     GFR Estimate If Black 06/20/2018 84  >60 mL/min/1.7m2 Final    African American GFR Calc     Calcium 06/20/2018 9.3  8.5 - 10.1 mg/dL Final     ALT 06/20/2018 23  0 - 50 U/L Final     Cholesterol 06/20/2018 155  <200 mg/dL Final     Triglycerides 06/20/2018 68  <150 mg/dL Final    Fasting specimen     HDL Cholesterol 06/20/2018 50  >49 mg/dL Final     LDL Cholesterol Calculated 06/20/2018 91  <100 mg/dL Final    Desirable:       <100 mg/dl     Non HDL Cholesterol 06/20/2018 105  <130 mg/dL Final     TSH 06/20/2018 2.08  0.40 - 4.00 mU/L Final       No EKG on file.     Review of Systems:  10 systems (general, cardiovascular, respiratory, eyes, ENT, endocrine, GI, , M/S, neurological) were reviewed. Most pertinent finding(s) is/are: migraines worse when not pregnant, TMJ pain . The remaining systems are all unremarkable.    Family History:   Patient reported family history includes:   Family History   Problem Relation Age of Onset     Hypertension Mother         Born 1953     Family History Negative Father         Born 1957     Breast Cancer Paternal Grandmother 50     Hypertension Paternal Grandmother      Osteoporosis Paternal Grandmother      Hyperlipidemia Maternal Grandmother      Hypertension Maternal Grandmother      Cerebrovascular Disease Maternal Grandmother 80     Osteoporosis Paternal Aunt      Mental Illness History: Yes: grandparents, but no immediate family  Substance Abuse History: Denies  Suicide  History: Denies  Medications: Denies     Social History:   Birth place: [place] , [place]  Childhood: Yes intact home  Siblings:  zero Brother(s),  two Sister(s)  Highest education level was college graduate.   Current Living situation:  [place], [place] with Spouse/Partner and Daughter . Feels safe at home.  Children: one     Legal History:  No: Patient denies any legal history    Significant Losses / Trauma / Abuse / Neglect Issues:  There are no indications or report of: significant losses, trauma, abuse or neglect.   Issues of possible neglect are not present.   A safety and risk management plan has not been developed at this time, however client was given the after-hours number / 911 should there be a change in any of these risk factors.    Mental Status Examination:     Appearance:  awake, alert, adequately groomed, appeared stated age, no apparent distress and normal weight  Attitude:  cooperative   Eye Contact:  good  Gait and Station: Normal, No assistive Devices used and No dizziness or falls  Psychomotor Behavior:  no evidence of tardive dyskinesia, dystonia, or tics  Oriented to:  time, person, and place  Attention Span and Concentration:  Normal  Speech:  clear, coherent, regular rate, regular rhythm and fluent  Mood:  good  Affect:  appropriate and in normal range  Associations:  no loose associations  Thought Process:  logical, linear and goal oriented  Thought Content:  no evidence of suicidal ideation or homicidal ideation and no evidence of psychotic thought  Recent and Remote Memory:  intact Not formally assessed. No amnesia.  Fund of Knowledge: advanced  Insight:  good  Judgment:  intact  Impulse Control:  intact    Suicide Risk Assessment:  Today Seema Nielson reports history of depression but denies suicidal ideation. In addition, there are notable risk factors for self-harm, including age and anxiety. However, risk is mitigated by commitment to family, sobriety, absence of past attempts,  ability to volunteer a safety plan, history of seeking help when needed, future oriented, denies suicidal intent or plan, no family history of suicide and denies homicidal ideation, intent, or plan. Therefore, based on all available evidence including the factors cited above, Seema Nielson does not appear to be at imminent risk for self-harm, does not meet criteria for a 72-hr hold, and therefore remains appropriate for ongoing outpatient level of care.  A thorough assessment of risk factors related to suicide and self-harm have been reviewed and are noted above. The patient convincingly denies acute suicidality on several occasions. Local community safety resources reviewed and printed for patient to use if needed. There was no deceit detected, and the patient presented in a manner that was believable.     DSM5  Diagnosis:  296.26 (F32.5) Major Depressive Disorder, Single Episode,  In full remission _  300.02 (F41.1) Generalized Anxiety Disorder    Medical Comorbidities Include:   Patient Active Problem List    Diagnosis Date Noted     Migraine without aura and without status migrainosus, not intractable 09/15/2016     Priority: Medium     Anxiety 09/15/2016     Priority: Medium     Irregular menses 06/09/2016     Priority: Medium     REHTT (generalized anxiety disorder) 12/21/2015     Priority: Medium     On zoloft, managed by Dr Hurtado       TMJ (dislocation of temporomandibular joint)      Priority: Medium     Has had surgeries for this, since middle school  On zanaflex       Constipation      Priority: Medium       Psychosocial & Contextual Factors:  Medical Comorbidites    Strengths and Opportunities:   Seema Nielson identified the following strengths or resources that will help she succeed in counseling: commitment to health and well being, friends / good social support, family support, intelligence, positive work environment and sense of humor. Things that may interfere with the patient's success include:   none noted at this time.    There are no language or communication issues or need for modification in treatment.   There are no ethnic, cultural or Caodaism factors that may be relevant for therapy.  Client identified their preferred language to be English.  Client does not need the assistance of an  or other support involved in therapy.    A 12-item WHODAS 2.0 assessment was completed by the patient today and recorded in EPIC.    WHODAS 2.0 Total Score 2019   Total Score 12   Total Score MyChart 12        Impression:  Seema Nielson has history of depression in remission. Has been struggling with anxiety and panic during pregnancy. Tolerating increased dose of Zoloft (sertraline) and engaging in more health promotion activities.      Medication side effects and alternatives reviewed. Health promotion activities recommended and reviewed today. All questions addressed. Education and counseling completed regarding risks and benefits of medications and psychotherapy options. Collaborative Care Psychiatry Service model reviewed today. Recommend therapy for additional support.     Risks and Benefits of use of medications during pregnancy and breastfeeding were reviewed.   Most common adverse effects of medications that were discussed include but are not limited to: fetal malformations, persistent pulmonary hypertension (PPHN), low birth weight, and poor  adaptation syndrome (PNAS).     Partner is supportive and agrees with being on medication.   She has discussed this with her OB and she is supportive as well.     Resources provided to patient today:  [facility]- Psychiatric Disorders During Pregnancy  https://womensmentalhealth.org/specialty-clinics/psychiatric-disorders-during-pregnancy/    MothertoBaby  [email or web address]    Still having breakthrough panic and taking Ativan (lorazepam)  0.5 mg from an old prescription. She feels comfortable taking this. Has  taken two so far this pregnancy. Just having the medication with her brings her calm. We can continue.     I can return back to Primary Care Provider team and OB team, but Patient is welcome to ask me questions or concerns that arise as her pregnancy continues.     I also had DELILAH Noland, [facility] come to introduce herself to Patient today and offered contact information if needed for more support through rest of pregnancy or during the post partum period.     Treatment Plan:    Continue Buspar (buspirone) 30 mg by mouth in AM and PM.    Continue Zoloft (sertraline) 150 mg by mouth daily.  May consider dose increase if needed to 200 mg daily.     Continue Ativan (lorazepam) 0.5 mg by mouth as needed for panic.     Continue all other medications as reviewed per electronic medical record today.     Safety plan reviewed. To the Emergency Department as needed or call after hours crisis line at [phone] or [phone]. [place] Crisis Text Line: Text [place] to 090837  or  Suicide LifeLine Chat: suicidepreiNEWiTline.org/chat    To schedule individual or family therapy, call [facility] at [phone]. Consider DELILAH Noland, [facility].  Thank you for our work together in the Psychiatry Collaborative Care Model at [facility]. This is our last visit and I am returning your care back to your Primary Care Provider Tay Lopes MD, MD . If you are not doing well, please contact your Primary Care Provider office.     Follow up with primary care provider as planned or for acute medical concerns.    Call the psychiatric nurse line with medication questions or concerns at [phone].    MyChart may be used to communicate with your provider, but this is not intended to be used for emergencies.     Community Resources:    National Suicide Prevention Lifeline: 187.569.4198 (TTY: 320.888.3438). Call anytime for help.  (www.suicidepreventionlifeline.org)  National  [place] on Mental Illness (www.memo.org): 695-848-4689 or 865-868-2604.   Mental Health Association (www.mentalhealth.org): 983.801.1676 or 707-321-1755.  [place] Crisis Text Line: Text [place] to 168053  Suicide LifeLine Chat: suicidepreventionNexPlanarline.org/chat    Administrative Billing:   Time spent with patient was 60 minutes and greater than 50% of time or 40 minutes was spent in counseling and coordination of care regarding above diagnoses and treatment plan.    Patient Status:  The patient is being returned to the referring provider for ongoing care and medication prescribing.  The patient can be referred back to this service for further consultation as needed.    Signed:   Deisi Castellanos, PhD, APRN, CNP  Psychiatry

## 2019-02-26 ENCOUNTER — OFFICE VISIT (OUTPATIENT)
Dept: BEHAVIORAL HEALTH | Facility: CLINIC | Age: 34
End: 2019-02-26
Payer: COMMERCIAL

## 2019-02-26 ENCOUNTER — OFFICE VISIT (OUTPATIENT)
Dept: PSYCHIATRY | Facility: CLINIC | Age: 34
End: 2019-02-26
Payer: COMMERCIAL

## 2019-02-26 VITALS
TEMPERATURE: 98.1 F | DIASTOLIC BLOOD PRESSURE: 60 MMHG | BODY MASS INDEX: 28.34 KG/M2 | WEIGHT: 166 LBS | HEART RATE: 74 BPM | HEIGHT: 64 IN | RESPIRATION RATE: 18 BRPM | OXYGEN SATURATION: 99 % | SYSTOLIC BLOOD PRESSURE: 98 MMHG

## 2019-02-26 DIAGNOSIS — F41.0 PANIC ATTACK: ICD-10-CM

## 2019-02-26 DIAGNOSIS — F41.1 GAD (GENERALIZED ANXIETY DISORDER): Primary | ICD-10-CM

## 2019-02-26 DIAGNOSIS — F33.42 RECURRENT MAJOR DEPRESSION IN COMPLETE REMISSION (H): ICD-10-CM

## 2019-02-26 PROCEDURE — 90792 PSYCH DIAG EVAL W/MED SRVCS: CPT | Performed by: NURSE PRACTITIONER

## 2019-02-26 RX ORDER — LORAZEPAM 0.5 MG/1
0.5 TABLET ORAL EVERY 6 HOURS PRN
COMMUNITY
End: 2019-09-11

## 2019-02-26 RX ORDER — SERTRALINE HYDROCHLORIDE 100 MG/1
150 TABLET, FILM COATED ORAL DAILY
Qty: 135 TABLET | Refills: 1 | Status: SHIPPED | OUTPATIENT
Start: 2019-02-26 | End: 2019-09-05

## 2019-02-26 ASSESSMENT — MIFFLIN-ST. JEOR: SCORE: 1442.97

## 2019-02-26 ASSESSMENT — PAIN SCALES - GENERAL: PAINLEVEL: NO PAIN (0)

## 2019-02-26 NOTE — PATIENT INSTRUCTIONS
Treatment Plan:    Continue Buspar (buspirone) 30 mg by mouth in AM and PM.    Continue Zoloft (sertraline) 150 mg by mouth daily.     Continue Ativan (lorazepam) 0.5 mg by mouth as needed for panic.     Continue all other medications as reviewed per electronic medical record today.     Safety plan reviewed. To the Emergency Department as needed or call after hours crisis line at [phone] or [phone]. [place] Crisis Text Line: Text [place] to 452427  or  Suicide LifeLine Chat: suicidepreventionlifeline.org/chat    To schedule individual or family therapy, call [facility] at [phone].   Thank you for our work together in the Psychiatry Collaborative Care Model at [facility]. This is our last visit and I am returning your care back to your Primary Care Provider Tay Lopes MD, MD . If you are not doing well, please contact your Primary Care Provider office.     Follow up with primary care provider as planned or for acute medical concerns.    Call the psychiatric nurse line with medication questions or concerns at [phone].    MyChart may be used to communicate with your provider, but this is not intended to be used for emergencies.

## 2019-02-26 NOTE — PROGRESS NOTES
Patient had appointment with his/her primary care physician. [facility] services were requested / offered. No immediate safety/risk issues were reported or identified.  Explained the role of the [facility] and provided informational handout and contact information for the [facility]. Increased anxiety. Was seeing Deisi. Card given.     Aleksandra Garcia, Behavioral Health Clinician

## 2019-02-26 NOTE — NURSING NOTE
"Chief Complaint   Patient presents with     Consult     referred by Myrna Wang- Pt is 26 weeks pregnant, medication review     initial BP 98/60 (BP Location: Left arm, Patient Position: Chair, Cuff Size: Adult Regular)   Pulse 74   Temp 98.1  F (36.7  C) (Oral)   Resp 18   Ht 1.626 m (5' 4\")   Wt 75.3 kg (166 lb)   LMP 08/08/2018 (Approximate)   SpO2 99%   BMI 28.49 kg/m   Estimated body mass index is 28.49 kg/m  as calculated from the following:    Height as of this encounter: 1.626 m (5' 4\").    Weight as of this encounter: 75.3 kg (166 lb)..  bp completed using cuff size regular    "

## 2019-03-08 ENCOUNTER — ALLIED HEALTH/NURSE VISIT (OUTPATIENT)
Dept: NURSING | Facility: CLINIC | Age: 34
End: 2019-03-08
Payer: COMMERCIAL

## 2019-03-08 DIAGNOSIS — Z34.93 SUPERVISION OF NORMAL PREGNANCY IN THIRD TRIMESTER: Primary | ICD-10-CM

## 2019-03-08 LAB
ERYTHROCYTE [DISTWIDTH] IN BLOOD BY AUTOMATED COUNT: 12.4 % (ref 10–15)
HCT VFR BLD AUTO: 34 % (ref 35–47)
HGB BLD-MCNC: 11.3 G/DL (ref 11.7–15.7)
MCH RBC QN AUTO: 31.4 PG (ref 26.5–33)
MCHC RBC AUTO-ENTMCNC: 33.2 G/DL (ref 31.5–36.5)
MCV RBC AUTO: 94 FL (ref 78–100)
PLATELET # BLD AUTO: 147 10E9/L (ref 150–450)
RBC # BLD AUTO: 3.6 10E12/L (ref 3.8–5.2)
WBC # BLD AUTO: 6.4 10E9/L (ref 4–11)

## 2019-03-08 PROCEDURE — 85027 COMPLETE CBC AUTOMATED: CPT | Performed by: OBSTETRICS & GYNECOLOGY

## 2019-03-08 PROCEDURE — 0064U ANTB TP TOTAL&RPR IA QUAL: CPT | Performed by: OBSTETRICS & GYNECOLOGY

## 2019-03-08 PROCEDURE — 82950 GLUCOSE TEST: CPT | Performed by: OBSTETRICS & GYNECOLOGY

## 2019-03-08 PROCEDURE — 99207 ZZC NO CHARGE NURSE ONLY: CPT

## 2019-03-08 PROCEDURE — 36415 COLL VENOUS BLD VENIPUNCTURE: CPT | Performed by: OBSTETRICS & GYNECOLOGY

## 2019-03-09 LAB — GLUCOSE 1H P 50 G GLC PO SERPL-MCNC: 123 MG/DL (ref 60–129)

## 2019-03-10 LAB — T PALLIDUM AB SER QL: NONREACTIVE

## 2019-03-14 ENCOUNTER — PRENATAL OFFICE VISIT (OUTPATIENT)
Dept: OBGYN | Facility: CLINIC | Age: 34
End: 2019-03-14
Payer: COMMERCIAL

## 2019-03-14 VITALS — SYSTOLIC BLOOD PRESSURE: 98 MMHG | DIASTOLIC BLOOD PRESSURE: 56 MMHG | BODY MASS INDEX: 28.67 KG/M2 | WEIGHT: 167 LBS

## 2019-03-14 DIAGNOSIS — Z34.80 SUPERVISION OF OTHER NORMAL PREGNANCY, ANTEPARTUM: ICD-10-CM

## 2019-03-14 DIAGNOSIS — Z23 NEED FOR TDAP VACCINATION: Primary | ICD-10-CM

## 2019-03-14 PROCEDURE — 90471 IMMUNIZATION ADMIN: CPT | Performed by: OBSTETRICS & GYNECOLOGY

## 2019-03-14 PROCEDURE — 90715 TDAP VACCINE 7 YRS/> IM: CPT | Performed by: OBSTETRICS & GYNECOLOGY

## 2019-03-14 PROCEDURE — 99207 ZZC PRENATAL VISIT: CPT | Performed by: OBSTETRICS & GYNECOLOGY

## 2019-03-14 NOTE — PROGRESS NOTES
SUBJECTIVE:                                                      Seema is a 33 year old  female who presents for annual exam.     No LMP recorded.. Menses have not returned as she is nursing her 2 yo still 4 times a day (naps, upon getting up in the morning, and at night to go to sleep.)  Using none for contraception.  She is currently considering pregnancy. After stopping birth control prior to her last pregnancy, her periods were every 8-10 weeks, and so she was treated with progesterone to induce a withdrawal bleed, then used Clomid to conceive. She is wondering if she will need that again.  Besides routine health maintenance, she has no other health concerns today .  GYNECOLOGIC HISTORY:    Seema is sexually active with 1 male partner(s) and is currently in a monogamous relationship.      History sexually transmitted infections:No STD history    History of abnormal Pap smear: YES - updated in Problem List and Health Maintenance accordingly  Family history of breast CA: Yes (Please explain): paternal GM  Family history of uterine/ovarian CA: No    Family history of colon CA: No      HISTORY:  Obstetric History       T1      L1     SAB0   TAB0   Ectopic0   Multiple0   Live Births1       # Outcome Date GA Lbr Amari/2nd Weight Sex Delivery Anes PTL Lv   1 Term 17 39w4d 04:36 / 06:31 7 lb 6.7 oz (3.365 kg) F Vag-Spont EPI N DEMARCO      Name: YOVANNY CASTRO      Apgar1:  7                Apgar5: 9        Past Medical History:   Diagnosis Date     Abnormal Pap smear of cervix     Treated with Cryo.  Normal paps since then.  Last pap 2016     Anxiety state, unspecified     On Zoloft, well controlled. Managed by Dr. Zhu/Marianne     Battery end of life of spinal cord stimulator      Constipation      Constipation      H/O: depression      History of vaccination against human papillomavirus      Migraine, unspecified, without mention of intractable migraine without mention of status  migrainosus     Managed by Dr. Zhu/Marianne     OAB (overactive bladder)     interstim initial      Temporomandibular joint disorders, unspecified      TMJ (dislocation of temporomandibular joint) middle school    Has taken Zanaflex.  Has also had surgeries.     Urinary problem      Past Surgical History:   Procedure Laterality Date     APPENDECTOMY OPEN  2004     MANDIBLE SURGERY  2003, 2006    To treat TMJ (Done by oromaxillary sugeon).     NEURAL STIMULATOR DEVICE      Done by urologist in [facility] to treat constipation.     REMOVE STIMULATOR SACRAL NERVE  10/15/2013    Procedure: REMOVE STIMULATOR SACRAL NERVE;   REPLACEMENT NEURO STIMULATOR BATTERY ;  Surgeon: Deacon Casiano MD;  Location: [facility]     Family History   Problem Relation Age of Onset     Hypertension Mother      Born 1953     Family History Negative Father      Born 1957     Breast Cancer Paternal Grandmother 50     Hypertension Paternal Grandmother      OSTEOPOROSIS Paternal Grandmother      Hyperlipidemia Maternal Grandmother      Hypertension Maternal Grandmother      CEREBROVASCULAR DISEASE Maternal Grandmother 80     OSTEOPOROSIS Paternal Aunt      Social History     Social History     Marital status:      Spouse name: N/A     Number of children: 0     Years of education: N/A     Occupational History     Dietician [employer]     Social History Main Topics     Smoking status: Never Smoker     Smokeless tobacco: Never Used     Alcohol use None      Comment: One drink/week (prior to pregnancy)     Drug use: No     Sexual activity: Yes     Partners: Male     Birth control/ protection: None     Other Topics Concern     None     Social History Narrative    Tries to walk/run occasionally       Current Outpatient Prescriptions:      busPIRone (BUSPAR) 15 MG tablet, Take 2 tablets (30 mg) by mouth 2 times daily, Disp: 460 tablet, Rfl: 3     Docosahexaenoic Acid (DHA PO), Take 2 tablets daily.,  "Disp: , Rfl:      FINACEA 15 % FOAM, APPLY TO AFFECTED AREAS QD, Disp: , Rfl: 11     Misc. Devices (BREAST PUMP) MISC, 1 each as needed, Disp: 1 each, Rfl: 0     multivitamin, therapeutic with minerals (MULTI-VITAMIN) TABS tablet, Take 1 tablet by mouth daily, Disp: , Rfl:      sertraline (ZOLOFT) 100 MG tablet, Take 1 tablet (100 mg) by mouth daily, Disp: 90 tablet, Rfl: 3     SUMAtriptan (IMITREX) 100 MG tablet, Take 100 mg by mouth at onset of headache for migraine Reported on 5/17/2017, Disp: , Rfl:      tretinoin (RETIN-A) 0.025 % cream, APPLY A PEA-SIZED AMOUNT TO AFFECTED AREAS QPM, Disp: , Rfl: 5     estradiol (ESTRACE VAGINAL) 0.1 MG/GM cream, Apply a small amount externally every night for 4-6 weeks. (Patient not taking: Reported on 6/8/2018), Disp: 42.5 g, Rfl: 1     Allergies   Allergen Reactions     Thimerosal      Thorazine [Chlorpromazine]      Gets panic attacks per pt       Past medical, surgical, social and family history were reviewed and updated in EPIC.    ROS:   12 point review of systems negative other than symptoms noted below.      OBJECTIVE:                                                      EXAM:  /71  Pulse 71  Ht 5' 4\" (1.626 m)  Wt 135 lb (61.2 kg)  Breastfeeding? Yes  BMI 23.17 kg/m2   BMI: Body mass index is 23.17 kg/(m^2).  General: Alert and oriented, no distress.  Psychiatric: Mood and affect within normal limits.  Skin: Warm and dry, no lesions, rashes or discolorations.  Neck: Neck supple. Thyroid palpbably normal in size and without nodularity.  Cardiovascular: Regular rate and rhythm, no murmurs, rubs or gallops.   Lungs:  Clear to auscultation bilaterally, breathing is unlabored.  Breasts:  Symmetric, no skin changes.  No dominant masses bilaterally.   Lymph:  No cervical, supraclavicular, infraclavicular, axillary or inguinal lymphadenopathy palpable.   Abdomen: Soft, nontender, no hepatosplenomegaly, no rebound or guarding, no masses, no hernias.   Vulva:  No " external lesions, normal female hair distribution, no inguinal adenopathy.    Urethra:  Midline, non-tender, well supported, no discharge  Vagina:  Well-estrogenized, no abnormal discharge, no lesions  Cervix: nontender.  Uterus:  anteverted, smooth contour, without enlargement, mobile, and without tenderness  Ovaries:  No masses appreciated, non-tender, mobile  Rectal Exam: deferred  Musculoskeletal: extremities normal      COUNSELING:   Reviewed preventive health counseling, as reflected in patient instructions       Regular exercise       Healthy diet/nutrition       Family planning   reports that she has never smoked. She has never used smokeless tobacco.        ASSESSMENT/PLAN:                                                      33 year old female with satisfactory annual exam  (Z00.00) Routine general medical examination at a health care facility  (primary encounter diagnosis)  Comment:   Plan: up to date on paps, no need for other health maintenance at this time.    (Z30.09) Family planning advice  Comment:   Plan: she can continue attempts at pregnancy while she is weaning, which she is considering trying to do now. We discussed how to do that, if she wishes to try. I would not use Clomid while she is still nursing 4 times a day, as this can suppress her ovulation and she should see what happens without nursing first, I think. She is in agreement with that plan. Follow up with me as needed.    June Jones MD         [Joint Pain] : joint pain [Joint Stiffness] : joint stiffness [Memory Lapses Or Loss] : memory lapses or loss [Negative] : Heme/Lymph

## 2019-03-14 NOTE — NURSING NOTE
"Chief Complaint   Patient presents with     Prenatal Care       Initial BP 98/56   Wt 75.8 kg (167 lb)   LMP 2018 (Approximate)   Breastfeeding? No   BMI 28.67 kg/m   Estimated body mass index is 28.67 kg/m  as calculated from the following:    Height as of 19: 1.626 m (5' 4\").    Weight as of this encounter: 75.8 kg (167 lb).  BP completed using cuff size: regular    Questioned patient about current smoking habits.  Pt. has never smoked.          28w6d  + FM daily  - heartburn  + headache occas.   - bleeding or leaking of fluid  - edema  Ana María Ordaz LPN               "

## 2019-03-14 NOTE — PROGRESS NOTES
PROBLEM LIST  LABS: Opos/RI    1. Low lying placenta (1.9 cm): plan repeat at 28w. Pelvic rest. One episode 2TM bleeding.  2. Anxiety: on zoloft, buspar. Has follow up with mental health as well.    Anxiety is better, is seeing a therapist which is helping. No pregnancy concerns. Follow up in 2 weeks.  June Jones MD

## 2019-03-20 ENCOUNTER — ANCILLARY PROCEDURE (OUTPATIENT)
Dept: ULTRASOUND IMAGING | Facility: CLINIC | Age: 34
End: 2019-03-20
Attending: OBSTETRICS & GYNECOLOGY
Payer: COMMERCIAL

## 2019-03-20 DIAGNOSIS — Z34.80 SUPERVISION OF OTHER NORMAL PREGNANCY, ANTEPARTUM: ICD-10-CM

## 2019-03-20 PROCEDURE — 76817 TRANSVAGINAL US OBSTETRIC: CPT | Performed by: OBSTETRICS & GYNECOLOGY

## 2019-03-20 PROCEDURE — 76816 OB US FOLLOW-UP PER FETUS: CPT | Performed by: OBSTETRICS & GYNECOLOGY

## 2019-04-04 ENCOUNTER — PRENATAL OFFICE VISIT (OUTPATIENT)
Dept: OBGYN | Facility: CLINIC | Age: 34
End: 2019-04-04
Payer: COMMERCIAL

## 2019-04-04 ENCOUNTER — TELEPHONE (OUTPATIENT)
Dept: INTERNAL MEDICINE | Facility: CLINIC | Age: 34
End: 2019-04-04

## 2019-04-04 VITALS — DIASTOLIC BLOOD PRESSURE: 60 MMHG | WEIGHT: 172 LBS | SYSTOLIC BLOOD PRESSURE: 104 MMHG | BODY MASS INDEX: 29.52 KG/M2

## 2019-04-04 DIAGNOSIS — F41.9 ANXIETY: Primary | ICD-10-CM

## 2019-04-04 DIAGNOSIS — Z34.80 SUPERVISION OF OTHER NORMAL PREGNANCY, ANTEPARTUM: Primary | ICD-10-CM

## 2019-04-04 PROCEDURE — 99207 ZZC PRENATAL VISIT: CPT | Performed by: OBSTETRICS & GYNECOLOGY

## 2019-04-04 NOTE — NURSING NOTE
"Chief Complaint   Patient presents with     Prenatal Care       Initial /60   Wt 78 kg (172 lb)   LMP 2018 (Approximate)   Breastfeeding? No   BMI 29.52 kg/m   Estimated body mass index is 29.52 kg/m  as calculated from the following:    Height as of 19: 1.626 m (5' 4\").    Weight as of this encounter: 78 kg (172 lb).  BP completed using cuff size: regular    Questioned patient about current smoking habits.  Pt. has never smoked.          31w6d  + FM daily  - nausea or vomiting  - bleeding or leaking of fluid  - edema   - headache or heartburn  Ana María Ordaz LPN                "

## 2019-04-04 NOTE — PROGRESS NOTES
PROBLEM LIST  LABS: Opos/RI    1. Low lying placenta (1.9 cm): plan repeat at 28w. Pelvic rest. One episode 2TM bleeding.  2. Anxiety: on zoloft, buspar. Has follow up with mental health as well.    Anxiety is better, overall doing well. Follow up in 2 weeks.  June Jones MD

## 2019-04-04 NOTE — TELEPHONE ENCOUNTER
Fax received from [facility] pharmacy stating Buspirone 30 mg is backordered until the beginning of May. Please send alternative dosing (10 or 15 mg tablets) or send an alternative medication. Please advise. Thanks.

## 2019-04-07 RX ORDER — BUSPIRONE HYDROCHLORIDE 15 MG/1
15 TABLET ORAL 2 TIMES DAILY
Qty: 360 TABLET | Refills: 3 | Status: SHIPPED | OUTPATIENT
Start: 2019-04-07 | End: 2019-09-11

## 2019-04-18 ENCOUNTER — PRENATAL OFFICE VISIT (OUTPATIENT)
Dept: OBGYN | Facility: CLINIC | Age: 34
End: 2019-04-18
Payer: COMMERCIAL

## 2019-04-18 VITALS — SYSTOLIC BLOOD PRESSURE: 102 MMHG | BODY MASS INDEX: 29.52 KG/M2 | WEIGHT: 172 LBS | DIASTOLIC BLOOD PRESSURE: 60 MMHG

## 2019-04-18 DIAGNOSIS — Z34.80 SUPERVISION OF OTHER NORMAL PREGNANCY, ANTEPARTUM: Primary | ICD-10-CM

## 2019-04-18 PROCEDURE — 99207 ZZC PRENATAL VISIT: CPT | Performed by: OBSTETRICS & GYNECOLOGY

## 2019-04-18 NOTE — NURSING NOTE
"Chief Complaint   Patient presents with     Prenatal Care       Initial /60   Wt 78 kg (172 lb)   LMP 2018 (Approximate)   Breastfeeding? No   BMI 29.52 kg/m   Estimated body mass index is 29.52 kg/m  as calculated from the following:    Height as of 19: 1.626 m (5' 4\").    Weight as of this encounter: 78 kg (172 lb).  BP completed using cuff size: regular    Questioned patient about current smoking habits.  Pt. has never smoked.          33w6d  + FM daily  + valentin brooks contractions  - bleeding, discharge or leaking of fluid  - headache or heartburn  - edema  Ana María Ordaz LPN                 "

## 2019-04-18 NOTE — PROGRESS NOTES
PROBLEM LIST  LABS: Opos/RI    1. Low lying placenta (1.9 cm): plan repeat at 28w. Pelvic rest. One episode 2TM bleeding.  2. Anxiety: on zoloft, buspar. Has follow up with mental health as well.    Follow up in 2 weeks. Doing well.  June Jones MD

## 2019-05-07 ENCOUNTER — PRENATAL OFFICE VISIT (OUTPATIENT)
Dept: OBGYN | Facility: CLINIC | Age: 34
End: 2019-05-07
Payer: COMMERCIAL

## 2019-05-07 VITALS — BODY MASS INDEX: 30.04 KG/M2 | WEIGHT: 175 LBS | SYSTOLIC BLOOD PRESSURE: 92 MMHG | DIASTOLIC BLOOD PRESSURE: 60 MMHG

## 2019-05-07 DIAGNOSIS — Z34.80 SUPERVISION OF OTHER NORMAL PREGNANCY, ANTEPARTUM: Primary | ICD-10-CM

## 2019-05-07 PROCEDURE — 99207 ZZC PRENATAL VISIT: CPT | Performed by: OBSTETRICS & GYNECOLOGY

## 2019-05-07 PROCEDURE — 87653 STREP B DNA AMP PROBE: CPT | Performed by: OBSTETRICS & GYNECOLOGY

## 2019-05-07 NOTE — TELEPHONE ENCOUNTER
MEDICATION APPEAL DENIED    Medication: sertraline (ZOLOFT) 50 MG tablet - DENIED     Denial Date: 2/11/2019    Denial Rational:     Second Level Appeal Information: Second level appeals will be managed by the clinic staff and provider. Please contact the [employer] Prior Authorization Team if additional information about the denial is needed.

## 2019-05-07 NOTE — NURSING NOTE
"Chief Complaint   Patient presents with     Prenatal Care       Initial BP 92/60   Wt 79.4 kg (175 lb)   LMP 2018 (Approximate)   Breastfeeding? No   BMI 30.04 kg/m   Estimated body mass index is 30.04 kg/m  as calculated from the following:    Height as of 19: 1.626 m (5' 4\").    Weight as of this encounter: 79.4 kg (175 lb).  BP completed using cuff size: regular    Questioned patient about current smoking habits.  Pt. has never smoked.          36w4d  + FM daily  + valentin brooks contractions  - cramping or bleeding  - edema  Ana María Ordaz LPN               "

## 2019-05-08 NOTE — PROGRESS NOTES
PROBLEM LIST  LABS: Opos/RI    1. Low lying placenta (1.9 cm): plan repeat at 28w. Pelvic rest. One episode 2TM bleeding.  2. Anxiety: on zoloft, buspar. Has follow up with mental health as well.    Group B Strep today. Follow up weekly. Signs of symptoms of labor reviewed, including when to call or come in for evaluation. Doing well.    June Jones MD

## 2019-05-09 LAB
GP B STREP DNA SPEC QL NAA+PROBE: NEGATIVE
SPECIMEN SOURCE: NORMAL

## 2019-05-16 ENCOUNTER — ANESTHESIA EVENT (OUTPATIENT)
Dept: OBGYN | Facility: CLINIC | Age: 34
End: 2019-05-16
Payer: COMMERCIAL

## 2019-05-16 ENCOUNTER — PRENATAL OFFICE VISIT (OUTPATIENT)
Dept: OBGYN | Facility: CLINIC | Age: 34
End: 2019-05-16
Payer: COMMERCIAL

## 2019-05-16 ENCOUNTER — NURSE TRIAGE (OUTPATIENT)
Dept: NURSING | Facility: CLINIC | Age: 34
End: 2019-05-16

## 2019-05-16 ENCOUNTER — HOSPITAL ENCOUNTER (INPATIENT)
Facility: CLINIC | Age: 34
LOS: 2 days | Discharge: HOME | End: 2019-05-18
Attending: OBSTETRICS & GYNECOLOGY | Admitting: OBSTETRICS & GYNECOLOGY
Payer: COMMERCIAL

## 2019-05-16 ENCOUNTER — ANESTHESIA (OUTPATIENT)
Dept: OBGYN | Facility: CLINIC | Age: 34
End: 2019-05-16
Payer: COMMERCIAL

## 2019-05-16 VITALS
BODY MASS INDEX: 29.47 KG/M2 | SYSTOLIC BLOOD PRESSURE: 104 MMHG | HEIGHT: 65 IN | WEIGHT: 176.9 LBS | DIASTOLIC BLOOD PRESSURE: 60 MMHG

## 2019-05-16 DIAGNOSIS — Z34.80 SUPERVISION OF OTHER NORMAL PREGNANCY, ANTEPARTUM: Primary | ICD-10-CM

## 2019-05-16 LAB
ABO + RH BLD: NORMAL
ABO + RH BLD: NORMAL
SPECIMEN EXP DATE BLD: NORMAL

## 2019-05-16 PROCEDURE — 59400 OBSTETRICAL CARE: CPT | Performed by: OBSTETRICS & GYNECOLOGY

## 2019-05-16 PROCEDURE — 25800030 ZZH RX IP 258 OP 636: Performed by: OBSTETRICS & GYNECOLOGY

## 2019-05-16 PROCEDURE — 12000000 ZZH R&B MED SURG/OB

## 2019-05-16 PROCEDURE — 25800030 ZZH RX IP 258 OP 636

## 2019-05-16 PROCEDURE — 0HQ9XZZ REPAIR PERINEUM SKIN, EXTERNAL APPROACH: ICD-10-PCS | Performed by: OBSTETRICS & GYNECOLOGY

## 2019-05-16 PROCEDURE — 25000132 ZZH RX MED GY IP 250 OP 250 PS 637: Performed by: OBSTETRICS & GYNECOLOGY

## 2019-05-16 PROCEDURE — 00HU33Z INSERTION OF INFUSION DEVICE INTO SPINAL CANAL, PERCUTANEOUS APPROACH: ICD-10-PCS | Performed by: ANESTHESIOLOGY

## 2019-05-16 PROCEDURE — 37000011 ZZH ANESTHESIA WARD SERVICE

## 2019-05-16 PROCEDURE — 25000128 H RX IP 250 OP 636: Performed by: OBSTETRICS & GYNECOLOGY

## 2019-05-16 PROCEDURE — 27110038 ZZH RX 271

## 2019-05-16 PROCEDURE — 25000128 H RX IP 250 OP 636

## 2019-05-16 PROCEDURE — 72200001 ZZH LABOR CARE VAGINAL DELIVERY SINGLE

## 2019-05-16 PROCEDURE — 40000671 ZZH STATISTIC ANESTHESIA CASE

## 2019-05-16 PROCEDURE — 25000125 ZZHC RX 250: Performed by: OBSTETRICS & GYNECOLOGY

## 2019-05-16 PROCEDURE — 3E0R3BZ INTRODUCTION OF ANESTHETIC AGENT INTO SPINAL CANAL, PERCUTANEOUS APPROACH: ICD-10-PCS | Performed by: ANESTHESIOLOGY

## 2019-05-16 PROCEDURE — 86900 BLOOD TYPING SEROLOGIC ABO: CPT | Performed by: OBSTETRICS & GYNECOLOGY

## 2019-05-16 PROCEDURE — 86780 TREPONEMA PALLIDUM: CPT | Performed by: OBSTETRICS & GYNECOLOGY

## 2019-05-16 PROCEDURE — 86901 BLOOD TYPING SEROLOGIC RH(D): CPT | Performed by: OBSTETRICS & GYNECOLOGY

## 2019-05-16 PROCEDURE — 99207 ZZC PRENATAL VISIT: CPT | Performed by: OBSTETRICS & GYNECOLOGY

## 2019-05-16 RX ORDER — EPHEDRINE SULFATE 50 MG/ML
INJECTION, SOLUTION INTRAMUSCULAR; INTRAVENOUS; SUBCUTANEOUS
Status: DISCONTINUED
Start: 2019-05-16 | End: 2019-05-17 | Stop reason: HOSPADM

## 2019-05-16 RX ORDER — MISOPROSTOL 200 UG/1
800 TABLET ORAL
Status: DISCONTINUED | OUTPATIENT
Start: 2019-05-16 | End: 2019-05-18 | Stop reason: HOSPADM

## 2019-05-16 RX ORDER — LANOLIN 100 %
OINTMENT (GRAM) TOPICAL
Status: DISCONTINUED | OUTPATIENT
Start: 2019-05-16 | End: 2019-05-18 | Stop reason: HOSPADM

## 2019-05-16 RX ORDER — AMOXICILLIN 250 MG
1 CAPSULE ORAL 2 TIMES DAILY
Status: DISCONTINUED | OUTPATIENT
Start: 2019-05-16 | End: 2019-05-18 | Stop reason: HOSPADM

## 2019-05-16 RX ORDER — BISACODYL 10 MG
10 SUPPOSITORY, RECTAL RECTAL DAILY PRN
Status: DISCONTINUED | OUTPATIENT
Start: 2019-05-18 | End: 2019-05-18 | Stop reason: HOSPADM

## 2019-05-16 RX ORDER — BUPIVACAINE HCL/0.9 % NACL/PF 0.125 %
PLASTIC BAG, INJECTION (ML) EPIDURAL
Status: COMPLETED
Start: 2019-05-16 | End: 2019-05-16

## 2019-05-16 RX ORDER — LIDOCAINE 40 MG/G
CREAM TOPICAL
Status: DISCONTINUED | OUTPATIENT
Start: 2019-05-16 | End: 2019-05-16

## 2019-05-16 RX ORDER — ACETAMINOPHEN 325 MG/1
650 TABLET ORAL EVERY 4 HOURS PRN
Status: DISCONTINUED | OUTPATIENT
Start: 2019-05-16 | End: 2019-05-18 | Stop reason: HOSPADM

## 2019-05-16 RX ORDER — CARBOPROST TROMETHAMINE 250 UG/ML
250 INJECTION, SOLUTION INTRAMUSCULAR
Status: DISCONTINUED | OUTPATIENT
Start: 2019-05-16 | End: 2019-05-16

## 2019-05-16 RX ORDER — HYDROCORTISONE 2.5 %
CREAM (GRAM) TOPICAL 3 TIMES DAILY PRN
Status: DISCONTINUED | OUTPATIENT
Start: 2019-05-16 | End: 2019-05-18 | Stop reason: HOSPADM

## 2019-05-16 RX ORDER — IBUPROFEN 800 MG/1
800 TABLET, FILM COATED ORAL
Status: COMPLETED | OUTPATIENT
Start: 2019-05-16 | End: 2019-05-16

## 2019-05-16 RX ORDER — FENTANYL CITRATE 50 UG/ML
50-100 INJECTION, SOLUTION INTRAMUSCULAR; INTRAVENOUS
Status: DISCONTINUED | OUTPATIENT
Start: 2019-05-16 | End: 2019-05-16

## 2019-05-16 RX ORDER — BUSPIRONE HYDROCHLORIDE 15 MG/1
30 TABLET ORAL 2 TIMES DAILY
Status: DISCONTINUED | OUTPATIENT
Start: 2019-05-16 | End: 2019-05-18 | Stop reason: HOSPADM

## 2019-05-16 RX ORDER — IBUPROFEN 800 MG/1
800 TABLET, FILM COATED ORAL EVERY 6 HOURS PRN
Status: DISCONTINUED | OUTPATIENT
Start: 2019-05-16 | End: 2019-05-18 | Stop reason: HOSPADM

## 2019-05-16 RX ORDER — METHYLERGONOVINE MALEATE 0.2 MG/ML
200 INJECTION INTRAVENOUS
Status: DISCONTINUED | OUTPATIENT
Start: 2019-05-16 | End: 2019-05-18 | Stop reason: HOSPADM

## 2019-05-16 RX ORDER — OXYTOCIN/0.9 % SODIUM CHLORIDE 30/500 ML
100-340 PLASTIC BAG, INJECTION (ML) INTRAVENOUS CONTINUOUS PRN
Status: COMPLETED | OUTPATIENT
Start: 2019-05-16 | End: 2019-05-16

## 2019-05-16 RX ORDER — OXYTOCIN 10 [USP'U]/ML
10 INJECTION, SOLUTION INTRAMUSCULAR; INTRAVENOUS
Status: DISCONTINUED | OUTPATIENT
Start: 2019-05-16 | End: 2019-05-18 | Stop reason: HOSPADM

## 2019-05-16 RX ORDER — OXYTOCIN/0.9 % SODIUM CHLORIDE 30/500 ML
100 PLASTIC BAG, INJECTION (ML) INTRAVENOUS CONTINUOUS
Status: DISCONTINUED | OUTPATIENT
Start: 2019-05-16 | End: 2019-05-18 | Stop reason: CLARIF

## 2019-05-16 RX ORDER — METHYLERGONOVINE MALEATE 0.2 MG/ML
200 INJECTION INTRAVENOUS ONCE
Status: COMPLETED | OUTPATIENT
Start: 2019-05-16 | End: 2019-05-16

## 2019-05-16 RX ORDER — NALOXONE HYDROCHLORIDE 0.4 MG/ML
.1-.4 INJECTION, SOLUTION INTRAMUSCULAR; INTRAVENOUS; SUBCUTANEOUS
Status: DISCONTINUED | OUTPATIENT
Start: 2019-05-16 | End: 2019-05-18 | Stop reason: HOSPADM

## 2019-05-16 RX ORDER — METHYLERGONOVINE MALEATE 0.2 MG/ML
INJECTION INTRAVENOUS
Status: DISCONTINUED
Start: 2019-05-16 | End: 2019-05-17 | Stop reason: HOSPADM

## 2019-05-16 RX ORDER — ONDANSETRON 2 MG/ML
4 INJECTION INTRAMUSCULAR; INTRAVENOUS EVERY 6 HOURS PRN
Status: DISCONTINUED | OUTPATIENT
Start: 2019-05-16 | End: 2019-05-16

## 2019-05-16 RX ORDER — NALOXONE HYDROCHLORIDE 0.4 MG/ML
.1-.4 INJECTION, SOLUTION INTRAMUSCULAR; INTRAVENOUS; SUBCUTANEOUS
Status: DISCONTINUED | OUTPATIENT
Start: 2019-05-16 | End: 2019-05-16

## 2019-05-16 RX ORDER — SODIUM CHLORIDE, SODIUM LACTATE, POTASSIUM CHLORIDE, CALCIUM CHLORIDE 600; 310; 30; 20 MG/100ML; MG/100ML; MG/100ML; MG/100ML
INJECTION, SOLUTION INTRAVENOUS CONTINUOUS
Status: DISCONTINUED | OUTPATIENT
Start: 2019-05-16 | End: 2019-05-16

## 2019-05-16 RX ORDER — METHYLERGONOVINE MALEATE 0.2 MG/ML
200 INJECTION INTRAVENOUS
Status: DISCONTINUED | OUTPATIENT
Start: 2019-05-16 | End: 2019-05-16

## 2019-05-16 RX ORDER — AMOXICILLIN 250 MG
2 CAPSULE ORAL 2 TIMES DAILY
Status: DISCONTINUED | OUTPATIENT
Start: 2019-05-16 | End: 2019-05-18 | Stop reason: HOSPADM

## 2019-05-16 RX ORDER — OXYTOCIN 10 [USP'U]/ML
10 INJECTION, SOLUTION INTRAMUSCULAR; INTRAVENOUS
Status: DISCONTINUED | OUTPATIENT
Start: 2019-05-16 | End: 2019-05-16

## 2019-05-16 RX ORDER — OXYTOCIN/0.9 % SODIUM CHLORIDE 30/500 ML
340 PLASTIC BAG, INJECTION (ML) INTRAVENOUS CONTINUOUS PRN
Status: DISCONTINUED | OUTPATIENT
Start: 2019-05-16 | End: 2019-05-18 | Stop reason: HOSPADM

## 2019-05-16 RX ORDER — OXYCODONE AND ACETAMINOPHEN 5; 325 MG/1; MG/1
1 TABLET ORAL
Status: DISCONTINUED | OUTPATIENT
Start: 2019-05-16 | End: 2019-05-16

## 2019-05-16 RX ORDER — ACETAMINOPHEN 325 MG/1
650 TABLET ORAL EVERY 4 HOURS PRN
Status: DISCONTINUED | OUTPATIENT
Start: 2019-05-16 | End: 2019-05-16

## 2019-05-16 RX ADMIN — SODIUM CHLORIDE, POTASSIUM CHLORIDE, SODIUM LACTATE AND CALCIUM CHLORIDE: 600; 310; 30; 20 INJECTION, SOLUTION INTRAVENOUS at 20:33

## 2019-05-16 RX ADMIN — IBUPROFEN 800 MG: 800 TABLET ORAL at 22:47

## 2019-05-16 RX ADMIN — Medication: at 20:30

## 2019-05-16 RX ADMIN — METHYLERGONOVINE MALEATE 200 MCG: 0.2 INJECTION, SOLUTION INTRAMUSCULAR; INTRAVENOUS at 23:48

## 2019-05-16 RX ADMIN — SODIUM CHLORIDE, POTASSIUM CHLORIDE, SODIUM LACTATE AND CALCIUM CHLORIDE 1000 ML: 600; 310; 30; 20 INJECTION, SOLUTION INTRAVENOUS at 19:50

## 2019-05-16 RX ADMIN — OXYTOCIN-SODIUM CHLORIDE 0.9% IV SOLN 30 UNIT/500ML 500 ML/HR: 30-0.9/5 SOLUTION at 21:47

## 2019-05-16 ASSESSMENT — MIFFLIN-ST. JEOR: SCORE: 1503.29

## 2019-05-16 NOTE — NURSING NOTE
"Chief Complaint   Patient presents with     Prenatal Care     37w6d       Initial /60   Ht 1.651 m (5' 5\")   Wt 80.2 kg (176 lb 14.4 oz)   LMP 2018 (Approximate)   BMI 29.44 kg/m   Estimated body mass index is 29.44 kg/m  as calculated from the following:    Height as of this encounter: 1.651 m (5' 5\").    Weight as of this encounter: 80.2 kg (176 lb 14.4 oz).  BP completed using cuff size: regular    Questioned patient about current smoking habits.  Pt. has never smoked.          The following HM Due: NONE    Demarcus Brandt MA               "

## 2019-05-16 NOTE — PROGRESS NOTES
PROBLEM LIST  LABS: Opos/RI    1. Low lying placenta: resolved.  2. Anxiety: on zoloft, buspar. Has follow up with mental health as well.    Doing well, having some contractions. Follow up weekly. Signs of symptoms of labor reviewed, including when to call or come in for evaluation.     June Jones MD

## 2019-05-17 LAB — T PALLIDUM AB SER QL: NONREACTIVE

## 2019-05-17 PROCEDURE — 25000132 ZZH RX MED GY IP 250 OP 250 PS 637: Performed by: OBSTETRICS & GYNECOLOGY

## 2019-05-17 PROCEDURE — 12000000 ZZH R&B MED SURG/OB

## 2019-05-17 PROCEDURE — 40000083 ZZH STATISTIC IP LACTATION SERVICES 1-15 MIN

## 2019-05-17 RX ADMIN — ACETAMINOPHEN 650 MG: 325 TABLET, FILM COATED ORAL at 18:04

## 2019-05-17 RX ADMIN — IBUPROFEN 800 MG: 800 TABLET ORAL at 18:04

## 2019-05-17 RX ADMIN — ACETAMINOPHEN 650 MG: 325 TABLET, FILM COATED ORAL at 07:50

## 2019-05-17 RX ADMIN — SENNOSIDES AND DOCUSATE SODIUM 1 TABLET: 8.6; 5 TABLET ORAL at 20:12

## 2019-05-17 RX ADMIN — SERTRALINE HYDROCHLORIDE 150 MG: 100 TABLET ORAL at 09:18

## 2019-05-17 RX ADMIN — ACETAMINOPHEN 650 MG: 325 TABLET, FILM COATED ORAL at 02:26

## 2019-05-17 RX ADMIN — BUSPIRONE HYDROCHLORIDE 30 MG: 15 TABLET ORAL at 09:17

## 2019-05-17 RX ADMIN — ACETAMINOPHEN 650 MG: 325 TABLET, FILM COATED ORAL at 22:18

## 2019-05-17 RX ADMIN — IBUPROFEN 800 MG: 800 TABLET ORAL at 11:13

## 2019-05-17 RX ADMIN — ACETAMINOPHEN 650 MG: 325 TABLET, FILM COATED ORAL at 13:36

## 2019-05-17 RX ADMIN — BUSPIRONE HYDROCHLORIDE 30 MG: 15 TABLET ORAL at 20:12

## 2019-05-17 RX ADMIN — SENNOSIDES AND DOCUSATE SODIUM 2 TABLET: 8.6; 5 TABLET ORAL at 09:18

## 2019-05-17 RX ADMIN — SENNOSIDES AND DOCUSATE SODIUM 1 TABLET: 8.6; 5 TABLET ORAL at 00:49

## 2019-05-17 RX ADMIN — IBUPROFEN 800 MG: 800 TABLET ORAL at 04:54

## 2019-05-17 NOTE — PLAN OF CARE
VSS. Tylenol and Ibuprofen for pain. Ambulating and voiding independently.  at bedside for support. Patients mobililty level scored using the bedside mobility assistance tool (BMAT). Patient is at a mobility level test number: 4. Mobility equipment used: none required. Required assist of 0 staff members. Further use of BMAT scoring not required.

## 2019-05-17 NOTE — ANESTHESIA PROCEDURE NOTES
Peripheral nerve/Neuraxial procedure note :  Pre-Procedure  Performed by   Referred by [facility]  Location:       .       Assessment/Narrative  .  .  . Comments:  Pre-Procedure  Performed by Britton Wahl MD  Location: OB.      PreAnesthestic Checklist: patient identified, IV checked, risks and benefits discussed, informed consent obtained, monitors and equipment checked, pre-op evaluation and at physician/surgeon's request.    Timeout   Correct Patient: Yes  Correct Procedure: Epidural catheter placement  Correct Site: Yes   Correct Position: Yes    Procedure Documentation  Procedure:   Epidural catheter block for Labor    Patient currently in labor and she and OBMD request a labor epidural to control her labor pains. Patient was interviewed and examined. Procedure and risks including but not limited to bleeding, infection, nerve injury, paralysis, PDPH, and inadequate block requiring intervention discussed with patient. Questions answered. This epidural is to be placed in anticipation of vaginal delivery.  She consents to the epidural procedure.  Time-out was performed.  I or my partners remain immediately available for management of any issues or complications and will monitor at appropriate intervals.  Procedure: Patient sitting. Betadine prep x 3. Sterile drape applied.  Mask and gloves used.  Lidocaine 1%  local infiltration at L 3-4.  17 G. Tuohy needle at L3-4 by loss of resistance into epidural space.  No CSF, paresthesia or blood. 1.5 % Lidocaine with 1:200,000 Epinephrine 5cc test dose. Epidural catheter inserted w/o resistance to 5 cm in epidural space. Then 0.125% bupivicaine 8 cc with NS 5 cc.  Aspiration negative for blood and CSF.   Negative for neuro change, paresthesia or symptoms of intravascular injection or intrathecal injection.  Infusion orders written and infusion of 0.125% bupivicaine 15cc per hour started.    Britton Wahl MD

## 2019-05-17 NOTE — L&D DELIVERY NOTE
DELIVERY SUMMARY    Seema Nielson MRN# [id number]   Age: 34 year old YOB: 1985     ASSESSMENT & PLAN: Vaginal delivery, 1st degree perineal repair - Routine post partum care    Saleh Assessment Tool Data  Gestational age:  37w6d    Patient pushed to +2 station, then draped in standard manner in dorsal lithotomy position.   delivered by spontaneous vaginal delivery without difficulty.   's mouth and nose bulb suctioned after delivery. Cord doubly clamped and cut after 60 second delay.  then transported to maternal abdomen, with APGARS of 7 and 9 at 1 and 5 minutes. Please see delivery summary for  weight. 30 units/500mL of Pitocin initiated per IV.    Perineum and vagina inspected with 1st degree laceration idenitified. The area was repaired with 3-0 Vicryl suture. Placenta delivered spontaneously intact. Lower uterine segment evacuated of clots and fundus firm. QBL 350mL. Sponges and counts were correct x 2.      Gestational Age:  Gestational Age: 37w6d     Maternal temperature range:  Temp  Av.6  F (36.4  C)  Min: 97.6  F (36.4  C)  Max: 97.6  F (36.4  C)    Membranes ruptured for:   0h 17m     GBS status:  Lab Results   Component Value Date    GBS Negative 2019       Antibiotic Status:  Antibiotics         IV Antibiotic Given         Additional Management      Fetal Status Prior to  Delivery Category 2    Fetal Status Comments         Sepsis Prebirth Score:       Sepsis Postbirth Score:       Determination based on clinical exam after birth:       Disposition:           Labor Event Times    Labor onset date:  19 Onset time:   6:00 PM   Dilation complete date:  19 Complete time:   9:20 PM   Start pushing date/time:  2019 2122      Labor Events     labor?:  No   steroids:  None  Labor Type:  Spontaneous  Predominate monitoring during 1st stage:  continuous electronic fetal monitoring     Antibiotics received  during labor?:  No     Rupture identifier:  Sac 1  Rupture date/time: 19   Rupture type:  Spontaneous rupture of membranes occuring during spontaneous labor or augmentation  Fluid color:  Clear  Fluid odor:  Normal     1:1 continuous labor support provided by?:  RN       Delivery/Placenta Date and Time    Delivery Date:  19 Delivery Time:   9:32 PM   Placenta Date/Time:  2019  9:42 PM  Oxytocin given at the time of delivery:  after delivery of placenta     Vaginal Counts     Initial count performed by 2 team members:   Two Team Members   Dr. Paul SOLORZANO RN       [place] [employer] [place] [employer]   Initial counts 2  5    Added to count  1     Final counts 2 1 5    Placed during labor Accounted for at the end of labor   No NA   No NA   No NA    Final count performed by 2 team members:   Two Team Members   Dr. Paul SOLORZANO RN      Final count correct?:  Yes     Apgars    Living status:  Living   1 Minute 5 Minute 10 Minute 15 Minute 20 Minute   Skin color: 0  1       Heart rate: 2  2       Reflex irritability: 2  2       Muscle tone: 2  2       Respiratory effort: 1  2       Total: 7  9       Apgars assigned by:  BRANNON SOLORZANO RN     Cord    Vessels:  3 Vessels Complications:  None   Cord Blood Disposition:  Lab Gases Sent?:  No       Resuscitation    Methods:  None   Care at Delivery:  Delivery Clinician:   Vira Miller MD Requested NNP attend  . Mother on antidepressant. Delayed cord clamping for one minute.   stimulatued to cry by drying skin with blankets, suction with bulb syringe. Infant doing well and left in room with nurse and family. Team dismissed.  Ko LAY CNNP MSN 9:38 PM, 2019     Skin to Skin and Feeding Plan    Skin to skin initiation date/time: 1841    Skin to skin with:  Mother  Skin to skin end date/time:     How do you plan to feed your baby:  Breastfeeding     Labor Events and Shoulder Dystocia    Fetal Tracing Prior to  Delivery:  Category 2  Shoulder dystocia present?:  Neg     Delivery (Maternal) (Provider to Complete) ([id number])    Episiotomy:  None  Perineal lacerations:  1st Repaired?:  Yes   Vaginal laceration?:  No    Cervical laceration?:  No       Blood Loss  Mother: Seema Nielson #[id number]   Start of Mother's Information    IO Blood Loss  05/16/19 1800 - 05/16/19 2209    Total QBL Blood Loss (mL) Hospital Encounter 350 mL    Total  350 mL         End of Mother's Information  Mother: Seema Nielson #[id number]         Delivery - Provider to Complete ([id number])    Delivering clinician:  Vira Miller DO  Delivery Type (Choose the 1 that will go to the Birth History):  Vaginal, Spontaneous          Placenta    Delayed Cord Clamping:  Done  Date/Time:  5/16/2019  9:42 PM  Removal:  Spontaneous  Disposition:  Hospital disposal     Anesthesia    Method:  Nitrous Oxide, Epidural          Presentation and Position    Presentation:  Vertex   Occiput Anterior           Vira Miller DO

## 2019-05-17 NOTE — PLAN OF CARE
Patient has been supplementing with donor milk and pumping. Admits to sore nipples. Using a nipple shield. Lactation updated. Lactation will consult with patient before discharge.

## 2019-05-17 NOTE — ANESTHESIA POSTPROCEDURE EVALUATION
Patient: Seema Nielson      S/P epidural for labor.   I or my partner was immediately available for management of this patient during epidural analgesia infusion.  VSS.  Doing well. Block resolved.  Neuro at baseline. Denies positional headache. Minimal side effects easily managed w/ PRN meds. No apparent anesthetic complications. No follow-up required.    Deacon Spain MD    Note:  Anesthesia Post Evaluation    Last vitals:  Vitals:    05/16/19 2315 05/16/19 2330 05/17/19 0600   BP: 107/68 120/76 117/60   Resp:   20   Temp:   97.8  F (36.6  C)         Electronically Signed By: Deacon Spain MD  May 17, 2019  8:22 AM

## 2019-05-17 NOTE — PLAN OF CARE
Data: Vital signs within normal limits. Postpartum checks within normal limits - see flow record. Patient eating and drinking normally. Patient able to empty bladder independently and is up ambulating. No apparent signs of infection. perineum healing well. Patient performing self cares and is able to care for infant.  Action: Patient medicated during the shift for pain and cramping. Admits to some burning of stiches with voiding, given tucks pads. See MAR. Patient reassessed within 1 hour after each medication and pain was improved - patient stated she was comfortable. Patient education done about self and infant cares. See flow record.  Response: Positive attachment behaviors observed with infant. Support persons  present.   Plan: Anticipate discharge on 5/18/19.

## 2019-05-17 NOTE — PROGRESS NOTES
[facility] Obstetrics Post-Partum Progress Note          Assessment and Plan:    Assessment:   Post-partum day #1  Normal spontaneous vaginal delivery  L&D complications: None      Doing well.  Clean wound without signs of infection.  Normal healing wound.  No immediate surgical complications identified.  No excessive bleeding  Pain well-controlled.      Plan:   Ambulation encouraged  Breast feeding strategies discussed  Monitor wound for signs of infection  Pain control measures as needed  Reportable signs and symptoms dicussed with the patient  Anticipate discharge tomorrow           Interval History:   Doing well.  Pain is well-controlled.  No fevers.  No history of foul-smelling vaginal discharge.  Good appetite.  Denies chest pain, shortness of breath, nausea or vomiting.  Vaginal bleeding is similar to a heavy menstrual flow.  Ambulatory.  Breastfeeding well.          Significant Problems:      Past Medical History:   Diagnosis Date     Abnormal Pap smear of cervix 2004    Treated with Cryo.  Normal paps since then.  Last pap 4/2016     Anxiety state, unspecified     On Zoloft, well controlled. Managed by Dr. Zhu/Marianne     Battery end of life of spinal cord stimulator      Constipation      Constipation      Depressive disorder     Anxiety     H/O: depression      History of vaccination against human papillomavirus      Migraine, unspecified, without mention of intractable migraine without mention of status migrainosus     Managed by Dr. Zhu/Marianne     OAB (overactive bladder)     interstim initial      Temporomandibular joint disorders, unspecified      TMJ (dislocation of temporomandibular joint) middle school    Has taken Zanaflex.  Has also had surgeries.     Urinary problem              Review of Systems:    The patient denies any chest pain, shortness of breath, excessive pain, fever, chills, purulent drainage from the wound, nausea or vomiting.          Medications:     All medications  related to the patient's surgery have been reviewed  Current Facility-Administered Medications   Medication     acetaminophen (TYLENOL) tablet 650 mg     [START ON 5/18/2019] bisacodyl (DULCOLAX) Suppository 10 mg     busPIRone (BUSPAR) tablet 30 mg     doxylamine (UNISOM) tablet 25 mg     hydrocortisone 2.5 % cream     ibuprofen (ADVIL/MOTRIN) tablet 800 mg     lactated ringers BOLUS 1,000 mL     lanolin ointment     measles, mumps and rubella vaccine (MMR) injection 0.5 mL     methylergonovine (METHERGINE) injection 200 mcg     misoprostol (CYTOTEC) tablet 800 mcg     naloxone (NARCAN) injection 0.1-0.4 mg     NO Rho (D) immune globulin (RhoGam) needed - mother Rh POSITIVE     oxytocin (PITOCIN) 30 units in 500 mL 0.9% NaCl infusion     oxytocin (PITOCIN) 30 units in 500 mL 0.9% NaCl infusion     oxytocin (PITOCIN) injection 10 Units     senna-docusate (SENOKOT-S/PERICOLACE) 8.6-50 MG per tablet 1 tablet    Or     senna-docusate (SENOKOT-S/PERICOLACE) 8.6-50 MG per tablet 2 tablet     sertraline (ZOLOFT) tablet 150 mg     [START ON 5/18/2019] sodium phosphate (FLEET ENEMA) 1 enema     Tdap (tetanus-diphtheria-acell pertussis) (ADACEL) injection 0.5 mL     tranexamic acid (CYKLOKAPRON) infusion 1 g             Physical Exam:   Vitals were reviewed  All vitals stable  Temp: (P) 98.3  F (36.8  C) Temp src: (P) Oral BP: (P) 119/67   Heart Rate: (P) 80 Resp: (P) 18        Uterine fundus is firm, non-tender and at the level of the umbilicus          Data:   All laboratory data related to this surgery reviewed  No imaging studies have been ordered    Royal Ashton MD

## 2019-05-17 NOTE — H&P
Significant change in general health status.  See prenatal record.    @ 37w6d  O+ / RI, Tdap received, GBS negative  EFW: 7lb   Spontaneous labor, plan for vaginal delivery      Vira Miller, DO  OB/GYN

## 2019-05-17 NOTE — ANESTHESIA PREPROCEDURE EVALUATION
Anesthesia Pre-Procedure Evaluation    Patient: Seema Nielson   MRN: [id number] : 1985          Preoperative Diagnosis: * No surgery found *        Past Medical History:   Diagnosis Date     Abnormal Pap smear of cervix     Treated with Cryo.  Normal paps since then.  Last pap 2016     Anxiety state, unspecified     On Zoloft, well controlled. Managed by Dr. Zhu/Marianne     Battery end of life of spinal cord stimulator      Constipation      Constipation      Depressive disorder     Anxiety     H/O: depression      History of vaccination against human papillomavirus      Migraine, unspecified, without mention of intractable migraine without mention of status migrainosus     Managed by Dr. Zhu/Marianne     OAB (overactive bladder)     interstim initial      Temporomandibular joint disorders, unspecified      TMJ (dislocation of temporomandibular joint) middle school    Has taken Zanaflex.  Has also had surgeries.     Urinary problem      Past Surgical History:   Procedure Laterality Date     APPENDECTOMY OPEN       MANDIBLE SURGERY  ,     To treat TMJ (Done by oromaxillary sugeon).     NEURAL STIMULATOR DEVICE      Done by urologist in [facility] to treat constipation.     REMOVE STIMULATOR SACRAL NERVE  10/15/2013    Procedure: REMOVE STIMULATOR SACRAL NERVE;   REPLACEMENT NEURO STIMULATOR BATTERY ;  Surgeon: Deacon Casiano MD;  Location: [facility]     Anesthesia Evaluation       history and physical reviewed .      No history of anesthetic complications          ROS/MED HX    ENT/Pulmonary:  - neg pulmonary ROS     Neurologic:       Cardiovascular:  - neg cardiovascular ROS       METS/Exercise Tolerance:     Hematologic:         Musculoskeletal:         GI/Hepatic:         Renal/Genitourinary:         Endo:         Psychiatric:         Infectious Disease:         Malignancy:         Other:                     neg OB ROS            Physical Exam      Airway   Mallampati: II  TM  "distance: > 3 FB  Neck ROM: full    Dental     Cardiovascular       Pulmonary             Lab Results   Component Value Date    WBC 6.4 03/08/2019    HGB 11.3 (L) 03/08/2019    HCT 34.0 (L) 03/08/2019     (L) 03/08/2019     06/20/2018    POTASSIUM 4.1 06/20/2018    CHLORIDE 104 06/20/2018    CO2 30 06/20/2018    BUN 19 06/20/2018    CR 0.93 06/20/2018    GLC 80 06/20/2018    ORACIO 9.3 06/20/2018    ALBUMIN 3.9 09/15/2016    PROTTOTAL 7.0 09/15/2016    ALT 23 06/20/2018    AST 9 09/15/2016    ALKPHOS 52 09/15/2016    BILITOTAL 0.6 09/15/2016    TSH 2.08 06/20/2018    HCG Negative 07/09/2016    HCGS Negative 07/09/2016       Preop Vitals  BP Readings from Last 3 Encounters:   05/16/19 104/60   05/07/19 92/60   04/18/19 102/60    Pulse Readings from Last 3 Encounters:   02/26/19 74   01/22/19 91   06/20/18 106      Resp Readings from Last 3 Encounters:   02/26/19 18   01/22/19 16   06/20/18 16    SpO2 Readings from Last 3 Encounters:   02/26/19 99%   01/22/19 99%   06/20/18 97%      Temp Readings from Last 1 Encounters:   02/26/19 98.1  F (36.7  C) (Oral)    Ht Readings from Last 1 Encounters:   05/16/19 1.651 m (5' 5\")      Wt Readings from Last 1 Encounters:   05/16/19 80.2 kg (176 lb 14.4 oz)    Estimated body mass index is 29.44 kg/m  as calculated from the following:    Height as of an earlier encounter on 5/16/19: 1.651 m (5' 5\").    Weight as of an earlier encounter on 5/16/19: 80.2 kg (176 lb 14.4 oz).       Anesthesia Plan      History & Physical Review      ASA Status:  2 .             Postoperative Care      Consents  Anesthetic plan, risks, benefits and alternatives discussed with:  Patient..                 Britton Wahl MD                    .  "

## 2019-05-17 NOTE — PROVIDER NOTIFICATION
05/16/19 2010   Provider Notification   Provider Name/Title Dr. Wahl    Method of Notification At Bedside   Request Evaluate in Person   Notification Reason Pain   Dr. Wahl at bedside for epidural placement

## 2019-05-17 NOTE — TELEPHONE ENCOUNTER
"Patient calling reporting she is in labor. Has been having contractions for the past several hours and states contractions have been 7 minutes apart. States contractions became 3-4 minutes apart in the past 45 minutes. Denies vaginal bleeding. States she just pulled up to the parking lot of [facility]. Advised to be seen at the labor and deliver and RN will call labor and delivery unit to inform patient is coming.     RN called Labor and Delivery at [facility] to inform patient is coming in for labor. RN spoke with labor and delivery charge nurse.     José Luis Rodriguez RN  [facility]     Reason for Disposition    [1] History of prior delivery (multipara) AND [2] contractions < 10 minutes apart AND [3] present 1 hour    Additional Information    Negative: Passed out (i.e., lost consciousness, collapsed and was not responding)    Negative: Shock suspected (e.g., cold/pale/clammy skin, too weak to stand, low BP, rapid pulse)    Negative: Difficult to awaken or acting confused (e.g., disoriented, slurred speech)    Negative: [1] SEVERE abdominal pain (e.g., excruciating) AND [2] constant AND [3] present > 1 hour    Negative: Severe bleeding (e.g., continuous red blood from vagina, or large blood clots)    Negative: Umbilical cord hanging out of the vagina (shiny, white, curled appearance, \"like telephone cord\")    Negative: Uncontrollable urge to push (i.e., feels like baby is coming out now)    Negative: Can see baby    Negative: Sounds like a life-threatening emergency to the triager    Negative: [1] First baby (primipara) AND [2] contractions < 6 minutes apart  AND [3] present 2 hours    Protocols used: PREGNANCY - LABOR-A-AH      "

## 2019-05-17 NOTE — PROGRESS NOTES
Public Health Nurse (PHN) met with patient, discussed resources within [facility].  Provided family resources of [facility] services resource card, home visiting card, community resource guide and car seat information card given and discussed.  Family is aware how to add baby to insurance and have a primary provider arranged for baby.  Offered referral for home visiting, family declined. Patient declined any questions or concerns.

## 2019-05-17 NOTE — PROVIDER NOTIFICATION
05/16/19 0030   Provider Notification   Provider Name/Title [facility]   Method of Notification In Department   Request Evaluate - Remote   Notification Reason Bleeding   Fundus firm w/o massage and U/1 but continues to have bleeding with fundal massages. MD gave orders for 0.2 mg Methergine IM.

## 2019-05-18 VITALS
SYSTOLIC BLOOD PRESSURE: 103 MMHG | TEMPERATURE: 98.1 F | DIASTOLIC BLOOD PRESSURE: 74 MMHG | HEART RATE: 76 BPM | RESPIRATION RATE: 18 BRPM

## 2019-05-18 PROCEDURE — 25000132 ZZH RX MED GY IP 250 OP 250 PS 637: Performed by: OBSTETRICS & GYNECOLOGY

## 2019-05-18 RX ORDER — FLUCONAZOLE 150 MG/1
150 TABLET ORAL SEE ADMIN INSTRUCTIONS
Qty: 3 TABLET | Refills: 11 | Status: SHIPPED | OUTPATIENT
Start: 2019-05-18 | End: 2019-08-02

## 2019-05-18 RX ADMIN — ACETAMINOPHEN 650 MG: 325 TABLET, FILM COATED ORAL at 08:28

## 2019-05-18 RX ADMIN — SERTRALINE HYDROCHLORIDE 150 MG: 100 TABLET ORAL at 08:28

## 2019-05-18 RX ADMIN — SENNOSIDES AND DOCUSATE SODIUM 1 TABLET: 8.6; 5 TABLET ORAL at 08:27

## 2019-05-18 RX ADMIN — IBUPROFEN 800 MG: 800 TABLET ORAL at 06:48

## 2019-05-18 RX ADMIN — ACETAMINOPHEN 650 MG: 325 TABLET, FILM COATED ORAL at 03:29

## 2019-05-18 RX ADMIN — BUSPIRONE HYDROCHLORIDE 30 MG: 15 TABLET ORAL at 08:27

## 2019-05-18 RX ADMIN — IBUPROFEN 800 MG: 800 TABLET ORAL at 00:28

## 2019-05-18 NOTE — DISCHARGE INSTRUCTIONS
Lactation: [phone]    Please schedule a follow up with your OBGYN in 6 weeks.   Follow up in 6  weeks   Call [phone] for appointment     Call and ask to be seen or talk to a doctor for the following: (You can go to the ob triage button   On answering service line) .     Increased bleeding, fever, general unwell feeling or increased pain.     Dr. Shira Chapin, DO    OB/GYN   [facility]        Postpartum Vaginal Delivery Instructions    Activity       Ask family and friends for help when you need it.    Do not place anything in your vagina for 6 weeks.    You are not restricted on other activities, but take it easy for a few weeks to allow your body to recover from delivery.  You are able to do any activities you feel up to that point.    No driving until you have stopped taking your pain medications (usually two weeks after delivery).     Call your health care provider if you have any of these symptoms:       Increased pain, swelling, redness, or fluid around your stiches from an episiotomy or perineal tear.    A fever above 100.4 F (38 C) with or without chills when placing a thermometer under your tongue.    You soak a sanitary pad with blood within 1 hour, or you see blood clots larger than a golf ball.    Bleeding that lasts more than 6 weeks.    Vaginal discharge that smells bad.    Severe pain, cramping or tenderness in your lower belly area.    A need to urinate more frequently (use the toilet more often), more urgently (use the toilet very quickly), or it burns when you urinate.    Nausea and vomiting.    Redness, swelling or pain around a vein in your leg.    Problems breastfeeding or a red or painful area on your breast.    Chest pain and cough or are gasping for air.    Problems coping with sadness, anxiety, or depression.  If you have any concerns about hurting yourself or the baby, call your provider immediately.     You have questions or concerns after  you return home.     Keep your hands clean:  Always wash your hands before touching your perineal area and stitches.  This helps reduce your risk of infection.  If your hands aren't dirty, you may use an alcohol hand-rub to clean your hands. Keep your nails clean and short.

## 2019-05-18 NOTE — PROGRESS NOTES
[facility]   Post-Partum Progress Note          Assessment and Plan:    Assessment:   Post-partum day #2  Normal spontaneous vaginal delivery  L&D complications: None      Doing well.  No excessive bleeding      Plan:   Ambulation encouraged  Discharge later today           Interval History:   Doing well.  Pain is well-controlled.  No fevers.  No history of foul-smelling vaginal discharge.  Good appetite.  Denies chest pain, shortness of breath, nausea or vomiting.  Vaginal bleeding is similar to a heavy menstrual flow.  Ambulatory.  Breastfeeding well.          Significant Problems:    None          Review of Systems:    The patient denies any chest pain, shortness of breath, excessive pain, fever, chills, purulent drainage from the wound, nausea or vomiting.          Medications:   All medications related to the patient's surgery have been reviewed          Physical Exam:   All vitals stable  Uterine fundus is firm, non-tender and at the level of the umbilicus          Data:     All laboratory data related to this surgery reviewed  Hemoglobin   Date Value Ref Range Status   03/08/2019 11.3 (L) 11.7 - 15.7 g/dL Final   10/29/2018 11.8 11.7 - 15.7 g/dL Final   06/20/2018 14.0 11.7 - 15.7 g/dL Final   06/27/2017 13.8 11.7 - 15.7 g/dL Final   05/26/2017 11.1 (L) 11.7 - 15.7 g/dL Final     -    DO Dr. Shira Schneider, DO    OB/GYN   [facility]

## 2019-05-18 NOTE — PLAN OF CARE
Data: Vital signs within normal limits. Postpartum checks within normal limits - see flow record. Patient eating and drinking normally. Patient able to empty bladder independently and is up ambulating. No apparent signs of infection. Episiotomy healing well. Patient performing self cares and is able to care for infant.  Action: Patient medicated during the shift for pain and cramping. See MAR. Patient reassessed within 1 hour after each medication and pain was improved - patient stated she was comfortable. Patient education done about discharge instructions. See flow record.  Response: Positive attachment behaviors observed with infant. Support persons  present.   Plan: Anticipate discharge on today.

## 2019-05-18 NOTE — LACTATION NOTE
Lactation in to see patient.  Some assistance given to get baby latch. Baby does push nipple out of mouth at times. Encouraged breast compressions to keep baby latched and nursing. Patient has lots of colostrum noted with hand expression. Encouraged frequent feeds. Basic breastfeeding information given. No further questions at this time. Encouraged patient to call prn.

## 2019-05-18 NOTE — PLAN OF CARE
Pt able to get some rest between cares w/  rooming-in.  States ordered pain medications making her more comfortable.  FOB present and helpful w/ cares.

## 2019-05-26 ENCOUNTER — NURSE TRIAGE (OUTPATIENT)
Dept: NURSING | Facility: CLINIC | Age: 34
End: 2019-05-26

## 2019-05-27 NOTE — TELEPHONE ENCOUNTER
Caller is  10 days postpartum NVD; with  1st degree episotomy; strained at stool earlier  Today and hashad  5 hours of moderate vaginal bleeding , soaking pad  Eery  1-2 hours; no cramping   Triage protcol reviewed   On call  Provider  Dr. Khan for   [facility] paged via  @ 7:52 PM   to call patient at  [phone]  Idalia Arguello RN  FNA    Reason for Disposition    MODERATE vaginal bleeding (i.e., soaking 1 pad or tampon per hour and present > 6 hours)    Additional Information    Negative: Shock suspected (e.g., cold/pale/clammy skin, too weak to stand, low BP, rapid pulse)    Negative: Difficult to awaken or acting confused (e.g., disoriented, slurred speech)    Negative: Passed out (i.e., lost consciousness, collapsed and was not responding)    Negative: Sounds like a life-threatening emergency to the triager    Negative: SEVERE dizziness (e.g., unable to stand, requires support to walk, feels like passing out now)    Negative: [1] SEVERE abdominal pain AND [2] present > 1 hour    Negative: Fever > 100.4 F (38.0 C)    Negative: SEVERE vaginal bleeding (i.e., soaking 2 pads or tampons per hour and present 2 or more hours)    Negative: Patient sounds very sick or weak to the triager    Protocols used: POSTPARTUM - VAGINAL BLEEDING AND LOCHIA-A-AH

## 2019-06-04 ENCOUNTER — MYC REFILL (OUTPATIENT)
Dept: OBGYN | Facility: CLINIC | Age: 34
End: 2019-06-04

## 2019-06-04 DIAGNOSIS — G43.709 CHRONIC MIGRAINE WITHOUT AURA WITHOUT STATUS MIGRAINOSUS, NOT INTRACTABLE: ICD-10-CM

## 2019-06-05 RX ORDER — METOCLOPRAMIDE 5 MG/1
5 TABLET ORAL 4 TIMES DAILY PRN
Qty: 30 TABLET | Refills: 1 | Status: SHIPPED | OUTPATIENT
Start: 2019-06-05 | End: 2020-02-24

## 2019-06-12 ENCOUNTER — TELEPHONE (OUTPATIENT)
Dept: OBGYN | Facility: CLINIC | Age: 34
End: 2019-06-12

## 2019-06-12 NOTE — TELEPHONE ENCOUNTER
This can still be normal. If she is having heavy bleeding or fevers, I would want to see her after an US. Thanks.   NICK

## 2019-06-12 NOTE — TELEPHONE ENCOUNTER
Patient calling:   19  States is still having bright red vaginal bleeding. Will have an occasional day where it is lighter, but still bright red.  Will be 4 weeks tomorrow.  Changes pad 6 x / day. No large clots,   No cramping.  Breastfeeding.  Pt concerned because bleeding is still bright red at 4 wks.  Please advise.  Doretha Cho, RN

## 2019-06-27 ENCOUNTER — PRENATAL OFFICE VISIT (OUTPATIENT)
Dept: OBGYN | Facility: CLINIC | Age: 34
End: 2019-06-27
Payer: COMMERCIAL

## 2019-06-27 VITALS — SYSTOLIC BLOOD PRESSURE: 112 MMHG | WEIGHT: 161 LBS | DIASTOLIC BLOOD PRESSURE: 60 MMHG | BODY MASS INDEX: 26.79 KG/M2

## 2019-06-27 DIAGNOSIS — Z30.430 ENCOUNTER FOR IUD INSERTION: ICD-10-CM

## 2019-06-27 DIAGNOSIS — Z30.430 ENCOUNTER FOR INSERTION OF INTRAUTERINE CONTRACEPTIVE DEVICE: ICD-10-CM

## 2019-06-27 LAB — HGB BLD-MCNC: 14 G/DL (ref 11.7–15.7)

## 2019-06-27 PROCEDURE — 85018 HEMOGLOBIN: CPT | Performed by: OBSTETRICS & GYNECOLOGY

## 2019-06-27 PROCEDURE — 58300 INSERT INTRAUTERINE DEVICE: CPT | Performed by: OBSTETRICS & GYNECOLOGY

## 2019-06-27 PROCEDURE — 99207 ZZC POST PARTUM EXAM: CPT | Performed by: OBSTETRICS & GYNECOLOGY

## 2019-06-27 PROCEDURE — 36415 COLL VENOUS BLD VENIPUNCTURE: CPT | Performed by: OBSTETRICS & GYNECOLOGY

## 2019-06-27 ASSESSMENT — PATIENT HEALTH QUESTIONNAIRE - PHQ9: SUM OF ALL RESPONSES TO PHQ QUESTIONS 1-9: 0

## 2019-06-27 NOTE — PROGRESS NOTES
Seema is here for a 6-week postpartum checkup.    She had a  of a viable girl, weight 6 pounds 9 oz., with none complications. Date of delivery was 19. Since delivery, she has been breast feeding.  She has no signs of infection, bleeding or other complications.  She is not pregnant.  We discussed contraceptions and she has chosen unsure. She is interested in long-acting reversible contraception, so options discussed in detail. She would like Mirena IUD and would like to have that placed today.      Post partum tubal: No  History of Gestational Diabetes? No  Type of Delivery:  Vaginal  Feeding Method:  Breast  If initiated breast feeding and stopped, how long did you breast feed?:  Not applicable    REVIEW OF SYSTEMS:  All reviewed and negative.  Contraception Plan: Mirena IUD    Medical History Reviewed yes -   Family History Reviewed yes -   Problem List Updated no    EXAM:  /60   Wt 73 kg (161 lb)   LMP 2018 (Approximate)   Breastfeeding? Yes   BMI 26.79 kg/m    HEENT: grossly normal.  ABDOMEN: soft, non tender, good bowel sounds, without masses rebound, guarding or tenderness.  PELVIC:  External genitalia; normal without lesion, repair well healed.   Vagina: normal mucosa and rugae, no discharge.  Cervix: multiparous, well healed, without lesion.  Uterus: non pregnant in size, firm , mobile, no lesions,     Normal shape, position and consistency  Adnexa: non tender, without masses.  EXTREMITIES:  warm to touch, good pulses, no ankle edema or calf tenderness.  NEUROLOGIC: grossly normal.    The pelvic exam revealed normal external genitalia. On bimanual exam the uterus was Midposition and normal in size with no tenderness present. A speculum was inserted into the vagina and the cervix was visualized. The cervix was prepped with Betadine . The anterior lip of the cervix was grasped with a single toothed tenaculum. The uterus sounded to 8 cm. A Mirena IUD was then inserted without  difficulty. The string was cut to 3 cm.  The patient experienced a mild amount of cramping.        ASSESSMENT:   6-week postpartum exam after .    PLAN:    Contraception methods discussed  Exercise encouraged  Follow up in 1 year.  One-hour glucose tolerance test needed? No  Mirena IUD for contraception. String check follow up in 4-6 weeks.    June Jones MD

## 2019-06-27 NOTE — NURSING NOTE
"Chief Complaint   Patient presents with     Postpartum Care     6wk PP visit. Pap/HPV up to date.        Initial /60   Wt 73 kg (161 lb)   LMP 2018 (Approximate)   Breastfeeding? Yes   BMI 26.79 kg/m   Estimated body mass index is 26.79 kg/m  as calculated from the following:    Height as of 19: 1.651 m (5' 5\").    Weight as of this encounter: 73 kg (161 lb).  BP completed using cuff size: regular    Questioned patient about current smoking habits.  Pt. has never smoked.          The following HM Due: NONE      The following patient reported/Care Every where data was sent to:  P ABSTRACT QUALITY INITIATIVES [[id number]]  Ana María Ordaz LPN               "

## 2019-08-02 ENCOUNTER — OFFICE VISIT (OUTPATIENT)
Dept: OBGYN | Facility: CLINIC | Age: 34
End: 2019-08-02
Payer: COMMERCIAL

## 2019-08-02 VITALS
DIASTOLIC BLOOD PRESSURE: 74 MMHG | OXYGEN SATURATION: 100 % | SYSTOLIC BLOOD PRESSURE: 127 MMHG | BODY MASS INDEX: 26.29 KG/M2 | WEIGHT: 158 LBS | RESPIRATION RATE: 12 BRPM | TEMPERATURE: 97.8 F | HEART RATE: 74 BPM

## 2019-08-02 DIAGNOSIS — N95.2 ATROPHIC VAGINITIS: ICD-10-CM

## 2019-08-02 DIAGNOSIS — Z30.431 IUD CHECK UP: Primary | ICD-10-CM

## 2019-08-02 DIAGNOSIS — L72.3 SEBACEOUS CYST: ICD-10-CM

## 2019-08-02 PROCEDURE — 99213 OFFICE O/P EST LOW 20 MIN: CPT | Performed by: OBSTETRICS & GYNECOLOGY

## 2019-08-02 RX ORDER — FLUCONAZOLE 150 MG/1
TABLET ORAL
Qty: 2 TABLET | Refills: 0 | Status: SHIPPED | OUTPATIENT
Start: 2019-08-02 | End: 2019-09-11

## 2019-08-02 RX ORDER — CLINDAMYCIN HCL 300 MG
300 CAPSULE ORAL 3 TIMES DAILY
Qty: 21 CAPSULE | Refills: 0 | Status: SHIPPED | OUTPATIENT
Start: 2019-08-02 | End: 2019-09-11

## 2019-08-02 RX ORDER — ESTRADIOL 0.1 MG/G
CREAM VAGINAL
Qty: 60 G | Refills: 1 | Status: SHIPPED | OUTPATIENT
Start: 2019-08-02 | End: 2019-11-25

## 2019-08-02 NOTE — PROGRESS NOTES
SUBJECTIVE:                                                   Seema Nielson is a 34 year old female who presents to clinic today for the following health issue(s):  Patient presents with:  IUD: recheck  Derm Problem: possible boil on inner r leg, has been there for a while, painful now       HPI:  Here for follow up of Mirena IUD. She reports irregular light. Some intermittent spotting, denies fever or chills.     Three other issues:  1. Recurrent boil or abscess/sebaceous cyst RLE, near the inguinal crease, off and on since pregnancy. Now is harder, tender, slightly red. No discharge. No fever or chills.    2. Vaginal dryness. Had this and painful intercourse after last delivery, and estrogen cream worked well.    3. Is worried that sacral nerve stimulator is not working, would like to speak with Dr. Ashton about it.      Problem list and histories reviewed & adjusted, as indicated.  Additional history: as documented.    Patient Active Problem List   Diagnosis     TMJ (dislocation of temporomandibular joint)     Constipation     RHETT (generalized anxiety disorder)     Irregular menses     Migraine without aura and without status migrainosus, not intractable     Recurrent major depression in complete remission (H)     Panic attack     Indication for care in labor or delivery     Spontaneous vaginal delivery     Mirena IUD inserted - remove on or before 6/27/2024     Past Surgical History:   Procedure Laterality Date     APPENDECTOMY OPEN  2004     MANDIBLE SURGERY  2003, 2006    To treat TMJ (Done by oromaxillary sugeon).     NEURAL STIMULATOR DEVICE      Done by urologist in [facility] to treat constipation.     REMOVE STIMULATOR SACRAL NERVE  10/15/2013    Procedure: REMOVE STIMULATOR SACRAL NERVE;   REPLACEMENT NEURO STIMULATOR BATTERY ;  Surgeon: Deacon Casiano MD;  Location: [facility]      Social History     Tobacco Use     Smoking status: Never Smoker     Smokeless tobacco: Never Used   Substance  Use Topics     Alcohol use: No     Alcohol/week: 0.0 oz     Comment: One drink/week (prior to pregnancy)      Problem (# of Occurrences) Relation (Name,Age of Onset)    Breast Cancer (1) Paternal Grandmother (50)    Cerebrovascular Disease (1) Maternal Grandmother (80)    Family History Negative (1) Father: Born 1957    Hyperlipidemia (1) Maternal Grandmother    Hypertension (3) Mother: Born 1953, Paternal Grandmother, Maternal Grandmother    Osteoporosis (2) Paternal Grandmother, Paternal Aunt              Current Outpatient Medications on File Prior to Visit:  busPIRone (BUSPAR) 15 MG tablet Take 1 tablet (15 mg) by mouth 2 times daily   busPIRone HCl (BUSPAR) 30 MG tablet Take 1 tablet (30 mg) by mouth 2 times daily   Docosahexaenoic Acid (DHA PO) Take 2 tablets daily.   Docusate Sodium (COLACE PO) Take by mouth 2 times daily   LORazepam (ATIVAN) 0.5 MG tablet Take 0.5 mg by mouth every 6 hours as needed   metoclopramide (REGLAN) 5 MG tablet Take 1 tablet (5 mg) by mouth 4 times daily as needed (for nausea and vomiting)   multivitamin, therapeutic with minerals (MULTI-VITAMIN) TABS tablet Take 1 tablet by mouth daily   sertraline (ZOLOFT) 100 MG tablet Take 1.5 tablets (150 mg) by mouth daily   SUMAtriptan (IMITREX) 100 MG tablet Take 1 tablet (100 mg) by mouth at onset of headache for migraine Reported on 5/17/2017     No current facility-administered medications on file prior to visit.   Allergies   Allergen Reactions     Thimerosal      Thorazine [Chlorpromazine]      Gets panic attacks per pt       ROS:  5 point ROS negative except as noted in HPI.    OBJECTIVE:     /74 (BP Location: Right arm, Patient Position: Chair, Cuff Size: Adult Regular)   Pulse 74   Temp 97.8  F (36.6  C) (Oral)   Resp 12   Wt 71.7 kg (158 lb)   LMP 08/08/2018 (Approximate)   SpO2 100%   BMI 26.29 kg/m    Body mass index is 26.29 kg/m .    Exam:  Pelvis: External genitalia, [place]tholin, urethral, and [place] glands within  normal limits. Urethra is without lesion and nontender to palpation. Bladder is nontender. On speculum exam, cervix is without lesion and vagina is normal without lesion or discharge. Mirena IUD strings are visualized at 3 cm and no other portion of the IUD is seen.    Extremities: small infected sebaceous cyst right leg, near inguinal crease. Mild erythema suggestive of early cellulitis.      In-Clinic Test Results:  No results found for this or any previous visit (from the past 24 hour(s)).    ASSESSMENT/PLAN:                                                        ICD-10-CM    1. IUD check up Z30.431    2. Sebaceous cyst L72.3 fluconazole (DIFLUCAN) 150 MG tablet     clindamycin (CLEOCIN) 300 MG capsule   3. Atrophic vaginitis N95.2 estradiol (ESTRACE) 0.1 MG/GM vaginal cream         - IUD in correct position.  - Clindamycin prescription. Discussed what to do if this is worse instead of better (follow up with primary care provider or urgent care.) Diflucan she always gets yeast infection with antibiotics.  - follow up with Dr. Ashton for SNS.    Follow up as needed for routine gyn care.    June Jones MD  [facility]

## 2019-08-02 NOTE — NURSING NOTE
"Chief Complaint   Patient presents with     IUD     recheck     Derm Problem     possible boil on inner r leg, has been there for a while, painful now        Initial /74 (BP Location: Right arm, Patient Position: Chair, Cuff Size: Adult Regular)   Pulse 74   Temp 97.8  F (36.6  C) (Oral)   Resp 12   Wt 71.7 kg (158 lb)   LMP 2018 (Approximate)   SpO2 100%   BMI 26.29 kg/m   Estimated body mass index is 26.29 kg/m  as calculated from the following:    Height as of 19: 1.651 m (5' 5\").    Weight as of this encounter: 71.7 kg (158 lb).  BP completed using cuff size: regular    Questioned patient about current smoking habits.  Pt. has never smoked.          The following HM Due: NONE      The following patient reported/Care Every where data was sent to:  P ABSTRACT QUALITY INITIATIVES [[id number]]  Ana María Ordaz LPN               "

## 2019-09-08 ASSESSMENT — ENCOUNTER SYMPTOMS
DYSURIA: 0
HEMATURIA: 0
PALPITATIONS: 0
DIARRHEA: 0
ABDOMINAL PAIN: 0
CHILLS: 0
NAUSEA: 0
CONSTIPATION: 1
NERVOUS/ANXIOUS: 0
HEARTBURN: 0
FREQUENCY: 0
MYALGIAS: 0
DIZZINESS: 0
JOINT SWELLING: 0
HEADACHES: 1
PARESTHESIAS: 0
FEVER: 0
EYE PAIN: 0
SHORTNESS OF BREATH: 0
WEAKNESS: 0
COUGH: 0
ARTHRALGIAS: 0
BREAST MASS: 0
HEMATOCHEZIA: 0
SORE THROAT: 0

## 2019-09-11 ENCOUNTER — OFFICE VISIT (OUTPATIENT)
Dept: INTERNAL MEDICINE | Facility: CLINIC | Age: 34
End: 2019-09-11
Payer: COMMERCIAL

## 2019-09-11 VITALS
HEIGHT: 65 IN | SYSTOLIC BLOOD PRESSURE: 114 MMHG | WEIGHT: 155.8 LBS | TEMPERATURE: 98.3 F | HEART RATE: 78 BPM | DIASTOLIC BLOOD PRESSURE: 71 MMHG | RESPIRATION RATE: 18 BRPM | BODY MASS INDEX: 25.96 KG/M2 | OXYGEN SATURATION: 97 %

## 2019-09-11 DIAGNOSIS — F41.1 GAD (GENERALIZED ANXIETY DISORDER): ICD-10-CM

## 2019-09-11 DIAGNOSIS — G43.009 MIGRAINE WITHOUT AURA AND WITHOUT STATUS MIGRAINOSUS, NOT INTRACTABLE: ICD-10-CM

## 2019-09-11 DIAGNOSIS — Z00.00 ROUTINE GENERAL MEDICAL EXAMINATION AT A HEALTH CARE FACILITY: Primary | ICD-10-CM

## 2019-09-11 DIAGNOSIS — F41.9 ANXIETY: ICD-10-CM

## 2019-09-11 PROCEDURE — 99395 PREV VISIT EST AGE 18-39: CPT | Performed by: NURSE PRACTITIONER

## 2019-09-11 PROCEDURE — 99213 OFFICE O/P EST LOW 20 MIN: CPT | Mod: 25 | Performed by: NURSE PRACTITIONER

## 2019-09-11 RX ORDER — BUSPIRONE HYDROCHLORIDE 15 MG/1
30 TABLET ORAL 2 TIMES DAILY
Qty: 360 TABLET | Refills: 3 | Status: SHIPPED | OUTPATIENT
Start: 2019-09-11 | End: 2019-12-17

## 2019-09-11 RX ORDER — SUMATRIPTAN 100 MG/1
100 TABLET, FILM COATED ORAL
Qty: 30 TABLET | Refills: 3 | Status: SHIPPED | OUTPATIENT
Start: 2019-09-11 | End: 2020-09-14

## 2019-09-11 RX ORDER — SERTRALINE HYDROCHLORIDE 100 MG/1
TABLET, FILM COATED ORAL
Qty: 135 TABLET | Refills: 3 | Status: SHIPPED | OUTPATIENT
Start: 2019-09-11 | End: 2020-12-03

## 2019-09-11 ASSESSMENT — ENCOUNTER SYMPTOMS
DIARRHEA: 0
JOINT SWELLING: 0
ARTHRALGIAS: 0
PARESTHESIAS: 0
HEARTBURN: 0
COUGH: 0
NERVOUS/ANXIOUS: 0
BREAST MASS: 0
DIZZINESS: 0
EYE PAIN: 0
NAUSEA: 0
PALPITATIONS: 0
WEAKNESS: 0
MYALGIAS: 0
SORE THROAT: 0
ABDOMINAL PAIN: 0
SHORTNESS OF BREATH: 0
DYSURIA: 0
FREQUENCY: 0
HEMATURIA: 0
CONSTIPATION: 1
HEADACHES: 1
FEVER: 0
CHILLS: 0
HEMATOCHEZIA: 0

## 2019-09-11 ASSESSMENT — MIFFLIN-ST. JEOR: SCORE: 1407.58

## 2019-09-11 NOTE — PROGRESS NOTES
SUBJECTIVE:   CC: Seema Nielson is an 34 year old woman who presents for preventive health visit.     Healthy Habits:     Getting at least 3 servings of Calcium per day:  Yes    Bi-annual eye exam:  Yes    Dental care twice a year:  Yes    Sleep apnea or symptoms of sleep apnea:  None    Diet:  Regular (no restrictions)    Frequency of exercise:  1 day/week    Duration of exercise:  Less than 15 minutes    Taking medications regularly:  Yes    Medication side effects:  None    PHQ-2 Total Score: 0    Additional concerns today:  No          Depression and Anxiety Follow-Up    How are you doing with your depression since your last visit? No change    How are you doing with your anxiety since your last visit?  No change    Are you having other symptoms that might be associated with depression or anxiety? No    Have you had a significant life event? New baby, breastfeeding     Do you have any concerns with your use of alcohol or other drugs? No    Social History     Tobacco Use     Smoking status: Never Smoker     Smokeless tobacco: Never Used   Substance Use Topics     Alcohol use: No     Alcohol/week: 0.0 oz     Comment: One drink/week (prior to pregnancy)     Drug use: No     PHQ 6/9/2016 2/23/2019 6/27/2019   PHQ-9 Total Score 0 0 0   Q9: Thoughts of better off dead/self-harm past 2 weeks Not at all Not at all Not at all     RHETT-7 SCORE 6/9/2016 10/4/2017 2/23/2019   Total Score - - 2 (minimal anxiety)   Total Score 2 0 2           Suicide Assessment Five-step Evaluation and Treatment (SAFE-T)    Today's PHQ-2 Score:   PHQ-2 ( 1999 Pf[employer]) 9/8/2019   Q1: Little interest or pleasure in doing things 0   Q2: Feeling down, depressed or hopeless 0   PHQ-2 Score 0   Q1: Little interest or pleasure in doing things Not at all   Q2: Feeling down, depressed or hopeless Not at all   PHQ-2 Score 0       Abuse: Current or Past(Physical, Sexual or Emotional)- No  Do you feel safe in your environment? Yes    Social History      Tobacco Use     Smoking status: Never Smoker     Smokeless tobacco: Never Used   Substance Use Topics     Alcohol use: No     Alcohol/week: 0.0 oz     Comment: One drink/week (prior to pregnancy)     If you drink alcohol do you typically have >3 drinks per day or >7 drinks per week? No    Alcohol Use 9/8/2019   Prescreen: >3 drinks/day or >7 drinks/week? No   Prescreen: >3 drinks/day or >7 drinks/week? -   No flowsheet data found.    Reviewed orders with patient.  Reviewed health maintenance and updated orders accordingly - Yes  BP Readings from Last 3 Encounters:   09/11/19 114/71   08/02/19 127/74   06/27/19 112/60    Wt Readings from Last 3 Encounters:   09/11/19 70.7 kg (155 lb 12.8 oz)   08/02/19 71.7 kg (158 lb)   06/27/19 73 kg (161 lb)                  Patient Active Problem List   Diagnosis     TMJ (dislocation of temporomandibular joint)     Constipation     RHETT (generalized anxiety disorder)     Irregular menses     Migraine without aura and without status migrainosus, not intractable     Recurrent major depression in complete remission (H)     Panic attack     Indication for care in labor or delivery     Spontaneous vaginal delivery     Mirena IUD inserted - remove on or before 6/27/2024     Past Surgical History:   Procedure Laterality Date     APPENDECTOMY OPEN  2004     MANDIBLE SURGERY  2003, 2006    To treat TMJ (Done by oromaxillary sugeon).     NEURAL STIMULATOR DEVICE      Done by urologist in [facility] to treat constipation.     REMOVE STIMULATOR SACRAL NERVE  10/15/2013    Procedure: REMOVE STIMULATOR SACRAL NERVE;   REPLACEMENT NEURO STIMULATOR BATTERY ;  Surgeon: Deacon Casiano MD;  Location: [facility]       Social History     Tobacco Use     Smoking status: Never Smoker     Smokeless tobacco: Never Used   Substance Use Topics     Alcohol use: No     Alcohol/week: 0.0 oz     Comment: One drink/week (prior to pregnancy)     Family History   Problem Relation Age of Onset      Hypertension Mother         Born 1953     Family History Negative Father         Born 1957     Breast Cancer Paternal Grandmother 50     Hypertension Paternal Grandmother      Osteoporosis Paternal Grandmother      Hyperlipidemia Maternal Grandmother      Hypertension Maternal Grandmother      Cerebrovascular Disease Maternal Grandmother 80     Osteoporosis Paternal Aunt          Current Outpatient Medications   Medication Sig Dispense Refill     busPIRone (BUSPAR) 15 MG tablet Take 2 tablets (30 mg) by mouth 2 times daily 360 tablet 3     Docusate Sodium (COLACE PO) Take by mouth 2 times daily       estradiol (ESTRACE) 0.1 MG/GM vaginal cream Apply a small amount to affected area nightly for 4 weeks. 60 g 1     metoclopramide (REGLAN) 5 MG tablet Take 1 tablet (5 mg) by mouth 4 times daily as needed (for nausea and vomiting) 30 tablet 1     multivitamin, therapeutic with minerals (MULTI-VITAMIN) TABS tablet Take 1 tablet by mouth daily       order for DME Equipment being ordered: electric breast pump 1 Device 0     sertraline (ZOLOFT) 100 MG tablet TAKE 1 AND 1/2 TABLETS(150 MG) BY MOUTH DAILY 135 tablet 3     SUMAtriptan (IMITREX) 100 MG tablet Take 1 tablet (100 mg) by mouth at onset of headache for migraine Reported on 5/17/2017 30 tablet 3       Mammogram not appropriate for this patient based on age.    Pertinent mammograms are reviewed under the imaging tab.  History of abnormal Pap smear: NO - age 30-65 PAP every 5 years with negative HPV co-testing recommended  PAP / HPV Latest Ref Rng & Units 6/9/2016 4/22/2013 4/17/2012   PAP - NIL - -   PAP DATE - [facility] Negative - NIL NIL   HPV 16 DNA NEG Negative - -   HPV 18 DNA NEG Negative - -   OTHER HR HPV NEG Negative - -     Reviewed and updated as needed this visit by clinical staff  Tobacco  Allergies  Meds  Med Hx  Surg Hx  Fam Hx  Soc Hx        Reviewed and updated as needed this visit by Provider            Review of Systems   Constitutional: Negative  "for chills and fever.   HENT: Negative for congestion, ear pain, hearing loss and sore throat.    Eyes: Negative for pain and visual disturbance.   Respiratory: Negative for cough and shortness of breath.    Cardiovascular: Negative for chest pain, palpitations and peripheral edema.   Gastrointestinal: Positive for constipation. Negative for abdominal pain, diarrhea, heartburn, hematochezia and nausea.   Breasts:  Negative for tenderness, breast mass and discharge.   Genitourinary: Negative for dysuria, frequency, genital sores, hematuria, pelvic pain, urgency, vaginal bleeding and vaginal discharge.   Musculoskeletal: Negative for arthralgias, joint swelling and myalgias.   Skin: Negative for rash.   Neurological: Positive for headaches. Negative for dizziness, weakness and paresthesias.   Psychiatric/Behavioral: Negative for mood changes. The patient is not nervous/anxious.      CONSTITUTIONAL: NEGATIVE for fever, chills, change in weight  INTEGUMENTARU/SKIN: NEGATIVE for worrisome rashes, moles or lesions  RESP: NEGATIVE for significant cough or SOB  BREAST: NEGATIVE for masses, tenderness or discharge  CV: NEGATIVE for chest pain, palpitations or peripheral edema  GI: NEGATIVE for nausea, abdominal pain, heartburn, or change in bowel habits  MUSCULOSKELETAL: NEGATIVE for significant arthralgias or myalgia  NEURO: NEGATIVE for weakness, dizziness or paresthesias  PSYCHIATRIC: NEGATIVE for changes in mood or affect     OBJECTIVE:   /71 (BP Location: Right arm, Patient Position: Sitting, Cuff Size: Adult Regular)   Pulse 78   Temp 98.3  F (36.8  C) (Oral)   Resp 18   Ht 1.651 m (5' 5\")   Wt 70.7 kg (155 lb 12.8 oz)   LMP 08/01/2018 (Exact Date)   SpO2 97%   BMI 25.93 kg/m    Physical Exam  GENERAL: healthy, alert and no distress  NECK: no adenopathy, no asymmetry, masses, or scars and thyroid normal to palpation  RESP: lungs clear to auscultation - no rales, rhonchi or wheezes  CV: regular rate and " "rhythm, normal S1 S2, no S3 or S4, no murmur, click or rub, no peripheral edema and peripheral pulses strong  ABDOMEN: soft, nontender, no hepatosplenomegaly, no masses and bowel sounds normal  PSYCH: mentation appears normal, affect normal/bright        ASSESSMENT/PLAN:       ICD-10-CM    1. Routine general medical examination at a health care facility Z00.00 **Basic metabolic panel FUTURE anytime     **CBC with platelets FUTURE anytime     Lipid panel reflex to direct LDL Fasting   2. Anxiety F41.9 busPIRone (BUSPAR) 15 MG tablet   3. RHETT (generalized anxiety disorder) F41.1 sertraline (ZOLOFT) 100 MG tablet   4. Migraine without aura and without status migrainosus, not intractable G43.009 SUMAtriptan (IMITREX) 100 MG tablet       COUNSELING:  Reviewed preventive health counseling, as reflected in patient instructions    Estimated body mass index is 25.93 kg/m  as calculated from the following:    Height as of this encounter: 1.651 m (5' 5\").    Weight as of this encounter: 70.7 kg (155 lb 12.8 oz).         reports that she has never smoked. She has never used smokeless tobacco.      Counseling Resources:  ATP IV Guidelines  Pooled Cohorts Equation Calculator  Breast Cancer Risk Calculator  FRAX Risk Assessment  ICSI Preventive Guidelines  Dietary Guidelines for Americans, 2010  USDA's MyPlate  ASA Prophylaxis  Lung CA Screening    Pauline Wang NP  [facility]  "

## 2019-09-13 DIAGNOSIS — Z00.00 ROUTINE GENERAL MEDICAL EXAMINATION AT A HEALTH CARE FACILITY: ICD-10-CM

## 2019-09-13 LAB
ANION GAP SERPL CALCULATED.3IONS-SCNC: 5 MMOL/L (ref 3–14)
BUN SERPL-MCNC: 14 MG/DL (ref 7–30)
CALCIUM SERPL-MCNC: 9.5 MG/DL (ref 8.5–10.1)
CHLORIDE SERPL-SCNC: 106 MMOL/L (ref 94–109)
CHOLEST SERPL-MCNC: 171 MG/DL
CO2 SERPL-SCNC: 29 MMOL/L (ref 20–32)
CREAT SERPL-MCNC: 0.95 MG/DL (ref 0.52–1.04)
ERYTHROCYTE [DISTWIDTH] IN BLOOD BY AUTOMATED COUNT: 12.4 % (ref 10–15)
GFR SERPL CREATININE-BSD FRML MDRD: 78 ML/MIN/{1.73_M2}
GLUCOSE SERPL-MCNC: 84 MG/DL (ref 70–99)
HCT VFR BLD AUTO: 41.2 % (ref 35–47)
HDLC SERPL-MCNC: 52 MG/DL
HGB BLD-MCNC: 13.9 G/DL (ref 11.7–15.7)
LDLC SERPL CALC-MCNC: 110 MG/DL
MCH RBC QN AUTO: 30.5 PG (ref 26.5–33)
MCHC RBC AUTO-ENTMCNC: 33.7 G/DL (ref 31.5–36.5)
MCV RBC AUTO: 91 FL (ref 78–100)
NONHDLC SERPL-MCNC: 119 MG/DL
PLATELET # BLD AUTO: 215 10E9/L (ref 150–450)
POTASSIUM SERPL-SCNC: 4.1 MMOL/L (ref 3.4–5.3)
RBC # BLD AUTO: 4.55 10E12/L (ref 3.8–5.2)
SODIUM SERPL-SCNC: 140 MMOL/L (ref 133–144)
TRIGL SERPL-MCNC: 44 MG/DL
WBC # BLD AUTO: 5.1 10E9/L (ref 4–11)

## 2019-09-13 PROCEDURE — 36415 COLL VENOUS BLD VENIPUNCTURE: CPT | Performed by: NURSE PRACTITIONER

## 2019-09-13 PROCEDURE — 85027 COMPLETE CBC AUTOMATED: CPT | Performed by: NURSE PRACTITIONER

## 2019-09-13 PROCEDURE — 80061 LIPID PANEL: CPT | Performed by: NURSE PRACTITIONER

## 2019-09-13 PROCEDURE — 80048 BASIC METABOLIC PNL TOTAL CA: CPT | Performed by: NURSE PRACTITIONER

## 2019-09-17 ENCOUNTER — OFFICE VISIT (OUTPATIENT)
Dept: OBGYN | Facility: CLINIC | Age: 34
End: 2019-09-17
Payer: COMMERCIAL

## 2019-09-17 DIAGNOSIS — K59.00 CONSTIPATION, UNSPECIFIED CONSTIPATION TYPE: Primary | ICD-10-CM

## 2019-09-17 DIAGNOSIS — N39.41 URGE INCONTINENCE OF URINE: ICD-10-CM

## 2019-09-17 PROCEDURE — 99213 OFFICE O/P EST LOW 20 MIN: CPT | Performed by: OBSTETRICS & GYNECOLOGY

## 2019-09-17 NOTE — PATIENT INSTRUCTIONS
You can reach your [place] Care Team any time of the day by calling [phone]. This number will put you in touch with the 24 hour nurse line if the clinic is closed.    To contact your OB/GYN Station Coordinator/Surgery Scheduler please call [phone]. This is a direct number for your care team between 8 a.m. and 4 p.m. Monday through Friday.    [facility] is open for your convenience:  Monday through Friday 8 a.m. to 6 p.m.  Closed weekends and all major holidays.

## 2019-09-18 PROBLEM — N39.41 URGE INCONTINENCE OF URINE: Status: ACTIVE | Noted: 2019-09-18

## 2019-09-18 NOTE — PROGRESS NOTES
HPI:  Seema Nielson is a 34 year old female  infrequent menses because of Mirena IUD for contraception, who presents for evaluation of constipation.  Largest child was 7 pounds 7 ounces a brow presentation and she states that she pushed for 5 hours.  Denies any instrumentation.  The patient states that approximately 12 years ago a urologist in  [facility] placed an InterStim device for  constipation.  The patient states that she was told that this was an off label use for this but it is helped her constipation.  The patient states that she had the InterStim device turned off during both of her pregnancies.  The patient states that she had a battery change in 2013.  The patient states that prior to having children she was not on any medications and had no difficulty with constipation.  Patient states that her constipation symptoms were not as bad between children but since the birth of her last child to become more prominent.  She also has urgency and occasional urge incontinence.  Rare symptoms of stress incontinence.  Denies any pain over the site of the implant fevers chills or other symptoms.  Patient states that she is comfortable turning it off and on.  At present she is using 2 Colace and MiraLAX daily for constipation this is not providing acceptable relief.    Past Medical History:   Diagnosis Date     Abnormal Pap smear of cervix     Treated with Cryo.  Normal paps since then.  Last pap 2016     Anxiety state, unspecified     On Zoloft, well controlled. Managed by Dr. Zhu/Marianne     Battery end of life of spinal cord stimulator      Constipation      Constipation      Depressive disorder     Anxiety     H/O: depression      History of vaccination against human papillomavirus      Migraine, unspecified, without mention of intractable migraine without mention of status migrainosus     Managed by Dr. Zhu/Marianne     OAB (overactive bladder)     interstim initial       Temporomandibular joint disorders, unspecified      TMJ (dislocation of temporomandibular joint) middle school    Has taken Zanaflex.  Has also had surgeries.     Urinary problem      Past Surgical History:   Procedure Laterality Date     APPENDECTOMY OPEN  2004     MANDIBLE SURGERY  2003, 2006    To treat TMJ (Done by oromaxillary sugeon).     NEURAL STIMULATOR DEVICE      Done by urologist in [facility] to treat constipation.     REMOVE STIMULATOR SACRAL NERVE  10/15/2013    Procedure: REMOVE STIMULATOR SACRAL NERVE;   REPLACEMENT NEURO STIMULATOR BATTERY ;  Surgeon: Deacon Casiano MD;  Location: [facility]     wisdom teeth      with jaw surgery     Family History   Problem Relation Age of Onset     Hypertension Mother         Born 1953     Leukemia Father      Breast Cancer Paternal Grandmother 50     Hypertension Paternal Grandmother      Osteoporosis Paternal Grandmother      Hyperlipidemia Maternal Grandmother      Hypertension Maternal Grandmother      Cerebrovascular Disease Maternal Grandmother 80     Osteoporosis Paternal Aunt      Social History     Socioeconomic History     Marital status:      Spouse name: Todd Nielson     Number of children: 0     Years of education: Not on file     Highest education level: Not on file   Occupational History     Occupation: Dietician     Employer: [employer]     Comment: [employer]   Social Needs     Financial resource strain: Not on file     Food insecurity:     Worry: Not on file     Inability: Not on file     Transportation needs:     Medical: Not on file     Non-medical: Not on file   Tobacco Use     Smoking status: Never Smoker     Smokeless tobacco: Never Used   Substance and Sexual Activity     Alcohol use: No     Alcohol/week: 0.0 oz     Comment: One drink/week (prior to pregnancy)     Drug use: No     Sexual activity: Yes     Partners: Male   Lifestyle     Physical activity:     Days per week: Not on file     Minutes per  session: Not on file     Stress: Not on file   Relationships     Social connections:     Talks on phone: Not on file     Gets together: Not on file     Attends Hoahaoism service: Not on file     Active member of club or organization: Not on file     Attends meetings of clubs or organizations: Not on file     Relationship status: Not on file     Intimate partner violence:     Fear of current or ex partner: Not on file     Emotionally abused: Not on file     Physically abused: Not on file     Forced sexual activity: Not on file   Other Topics Concern     Parent/sibling w/ CABG, MI or angioplasty before 65F 55M? Not Asked   Social History Narrative    Tries to walk/run occasionally       Allergies:  Thimerosal and Thorazine [chlorpromazine]    Current Outpatient Medications   Medication Sig Dispense Refill     busPIRone (BUSPAR) 15 MG tablet Take 2 tablets (30 mg) by mouth 2 times daily 360 tablet 3     Docusate Sodium (COLACE PO) Take by mouth 2 times daily       estradiol (ESTRACE) 0.1 MG/GM vaginal cream Apply a small amount to affected area nightly for 4 weeks. 60 g 1     metoclopramide (REGLAN) 5 MG tablet Take 1 tablet (5 mg) by mouth 4 times daily as needed (for nausea and vomiting) 30 tablet 1     multivitamin, therapeutic with minerals (MULTI-VITAMIN) TABS tablet Take 1 tablet by mouth daily       order for DME Equipment being ordered: electric breast pump 1 Device 0     sertraline (ZOLOFT) 100 MG tablet TAKE 1 AND 1/2 TABLETS(150 MG) BY MOUTH DAILY 135 tablet 3     SUMAtriptan (IMITREX) 100 MG tablet Take 1 tablet (100 mg) by mouth at onset of headache for migraine Reported on 5/17/2017 30 tablet 3       Review Of Systems   ROS: 10 point ROS neg other than the symptoms noted above in the HPI.    Exam:  BP (P) 104/70   Wt (P) 70.3 kg (155 lb)   BMI (P) 25.79 kg/m    {Constitutional: healthy, alert and no distress    Assessment/Plan:  (K59.00) Constipation, unspecified constipation type  (primary encounter  diagnosis)  Comment: Risks, benefits, and alternative modes of therapy discussed at length. Pathophysiology of the disease process reviewed, all of the patients questions answered and informed consent obtained.    Plan: I am been asked kofi Gandara the rep from [employer] to interrogate the unit.  I would anticipate after 6 years that she probably needs a battery change in the pulse generator.  I would also like to confirm the leads are properly placed.  I would also like to perhaps have her see GI medicine or colorectal surgery to see if there is some therapy other than InterStim for her constipation    (N39.41) Urge incontinence of urine  Comment: Pathophysiology of the disease process was reviewed  Plan: We will have the [employer] rep see her with appropriate therapy to follow.  Written plan was given        Royal Ashton M.D.

## 2019-09-19 ENCOUNTER — MYC MEDICAL ADVICE (OUTPATIENT)
Dept: OBGYN | Facility: CLINIC | Age: 34
End: 2019-09-19

## 2019-09-20 NOTE — TELEPHONE ENCOUNTER
I spoke with Charo and asked her to help arrange an appointment with afsaneh Gandara the [employer] rep so that we can interrogate her pulse generator and determine if it is functioning properly.  Please see the office visit notes from 9/18/2019.  Can you please confirm that we have an appointment with afsaneh scheduled   Thank you   Royal Ashton MD FACOG    Routing comment        Appt is scheduled for 10/4.          Mary Jo Gutierrez RN

## 2019-10-04 ENCOUNTER — OFFICE VISIT (OUTPATIENT)
Dept: OBGYN | Facility: CLINIC | Age: 34
End: 2019-10-04
Payer: COMMERCIAL

## 2019-10-04 VITALS — DIASTOLIC BLOOD PRESSURE: 70 MMHG | BODY MASS INDEX: 25.63 KG/M2 | SYSTOLIC BLOOD PRESSURE: 104 MMHG | WEIGHT: 154 LBS

## 2019-10-04 DIAGNOSIS — K59.9 BOWEL DYSFUNCTION: Primary | ICD-10-CM

## 2019-10-04 PROCEDURE — 99213 OFFICE O/P EST LOW 20 MIN: CPT | Performed by: OBSTETRICS & GYNECOLOGY

## 2019-10-04 NOTE — NURSING NOTE
"Chief Complaint   Patient presents with     RECHECK       Initial /70   Wt 69.9 kg (154 lb)   BMI 25.63 kg/m   Estimated body mass index is 25.63 kg/m  as calculated from the following:    Height as of 19: 1.651 m (5' 5\").    Weight as of this encounter: 69.9 kg (154 lb).  BP completed using cuff size: regular    Questioned patient about current smoking habits.  Pt. has never smoked.          The following HM Due: NONE      The following patient reported/Care Every where data was sent to:  P ABSTRACT QUALITY INITIATIVES [[id number]]        iLliya Walker [facility]                 "

## 2019-10-06 PROBLEM — K59.9 BOWEL DYSFUNCTION: Status: ACTIVE | Noted: 2019-10-06

## 2019-10-07 NOTE — PROGRESS NOTES
Seema Nielson is a 34 year old female  infrequent menses because of Mirena IUD for contraception, who presents for evaluation of constipation.  Largest child was 7 pounds 7 ounces a brow presentation and she states that she pushed for 5 hours.  Denies any instrumentation.  The patient states that approximately 12 years ago a urologist in  [facility] placed an InterStim device for  constipation.  The patient states that she was told that this was an off label use for this but it is helped her constipation.  The patient states that she had the InterStim device turned off during both of her pregnancies.  The patient states that she had a battery change in 2013.  The patient states that prior to having children she was not on any medications and had no difficulty with constipation.  Patient states that her constipation symptoms were not as bad between children but since the birth of her last child to become more prominent.  She also has urgency and occasional urge incontinence.  Rare symptoms of stress incontinence.  Denies any pain over the site of the implant fevers chills or other symptoms.  Patient states that she is comfortable turning it off and on.  At present she is using 2 Colace and MiraLAX daily for constipation this is not providing acceptable relief.  I have kofi Gandara the representative from [employer] see the patient today.  The patient has 6% battery life and the pulse generator remaining.  Impedance values were within an acceptable range of therapy.  He changed her program settings today and she does appear to have acceptable stimulation sensation.    (K59.9) Bowel dysfunction  (primary encounter diagnosis)  Comment: The patient is going to try the new program for 2 weeks.  She was given a total of 4 programs to try.  She will keep a symptom diary and I will see her back in 8 weeks for follow-up.  If her symptoms are not under acceptable control we may consider  replacing the battery and pulse generator.  If we do that we may also consider lead change with the assumption that with childbirth lead placement may now be suboptimal to achieve adequate results  Plan: A detailed written plan was given the patient.  I will see her back in 2 months for follow-up or as needed concerns    15 min spent w > 50% in counseling

## 2019-11-06 ENCOUNTER — HEALTH MAINTENANCE LETTER (OUTPATIENT)
Age: 34
End: 2019-11-06

## 2019-11-12 ENCOUNTER — MYC MEDICAL ADVICE (OUTPATIENT)
Dept: OBGYN | Facility: CLINIC | Age: 34
End: 2019-11-12

## 2019-11-14 ENCOUNTER — MYC MEDICAL ADVICE (OUTPATIENT)
Dept: INTERNAL MEDICINE | Facility: CLINIC | Age: 34
End: 2019-11-14

## 2019-11-14 DIAGNOSIS — R05.9 COUGH: Primary | ICD-10-CM

## 2019-11-14 NOTE — TELEPHONE ENCOUNTER
Can you please see if you can help get this patient in prior to her scheduled appointment in December.  I would also like to have Garry Gandara the representative from [employer] present. His phone number is [phone]    Jarred can also help to expedite this appointment    Thank you

## 2019-11-14 NOTE — TELEPHONE ENCOUNTER
Called Garry, he is available 11/25/19 at 130.    Called pt  And that works for her, scheduled.    Anyi RIVERO R.N.  [facility][1]

## 2019-11-14 NOTE — TELEPHONE ENCOUNTER
Please see patient's mychart message below.    Last office visit 9-11-19    Please advise, thanks.  (Routed to colleague d/t primary care provider not in clinic today.)

## 2019-11-18 RX ORDER — ALBUTEROL SULFATE 90 UG/1
2 AEROSOL, METERED RESPIRATORY (INHALATION) EVERY 6 HOURS
Qty: 1 INHALER | Refills: 0 | Status: SHIPPED | OUTPATIENT
Start: 2019-11-18 | End: 2020-02-24

## 2019-11-25 ENCOUNTER — PREP FOR PROCEDURE (OUTPATIENT)
Dept: OBGYN | Facility: CLINIC | Age: 34
End: 2019-11-25

## 2019-11-25 ENCOUNTER — TELEPHONE (OUTPATIENT)
Dept: OBGYN | Facility: CLINIC | Age: 34
End: 2019-11-25

## 2019-11-25 ENCOUNTER — OFFICE VISIT (OUTPATIENT)
Dept: OBGYN | Facility: CLINIC | Age: 34
End: 2019-11-25
Payer: COMMERCIAL

## 2019-11-25 VITALS — DIASTOLIC BLOOD PRESSURE: 66 MMHG | WEIGHT: 149 LBS | BODY MASS INDEX: 24.79 KG/M2 | SYSTOLIC BLOOD PRESSURE: 106 MMHG

## 2019-11-25 DIAGNOSIS — K59.00 CONSTIPATION, UNSPECIFIED CONSTIPATION TYPE: Primary | ICD-10-CM

## 2019-11-25 DIAGNOSIS — R15.2 RECTAL URGENCY: Primary | ICD-10-CM

## 2019-11-25 DIAGNOSIS — R15.2 RECTAL URGENCY: ICD-10-CM

## 2019-11-25 DIAGNOSIS — N39.41 URGE INCONTINENCE OF URINE: ICD-10-CM

## 2019-11-25 DIAGNOSIS — K59.9 BOWEL DYSFUNCTION: ICD-10-CM

## 2019-11-25 PROCEDURE — 99213 OFFICE O/P EST LOW 20 MIN: CPT | Performed by: OBSTETRICS & GYNECOLOGY

## 2019-11-25 NOTE — TELEPHONE ENCOUNTER
Royal Ashton MD Kamleiter, Jody L             Surgeon: Dr Royal Ashton   Assist:  Yes   Location: [facility]   Date/time preference:  Pt convenience     Surgery: Removal of previously placed InterStim system and insertion of new Complete InterStim system implantation with incision and implantation of tined quadripolar lead electrodes into foramen S3 with fluoroscopic guidance for needle placement subcutaneous implantation of sacral neural stimulator and electronic analysis and complex programming.   Length of Surgery:  1 hr   Diagnosis:  Rectal urgency unresponsive to conservative measures   Anesthesia type:  MAC     Special instructions / equipment:  Garry Gandara from [employer] to be present, fluroscopy needed for this procedure     Am admit or same day: SAME DAY   Bowel prep: No   Pre op: PCP   Office visit with surgeon prior to surgery: Yes   Schedule Post Op: 1-2 weeks

## 2019-11-25 NOTE — NURSING NOTE
"Chief Complaint   Patient presents with     RECHECK       Initial /66   Wt 67.6 kg (149 lb)   BMI 24.79 kg/m   Estimated body mass index is 24.79 kg/m  as calculated from the following:    Height as of 19: 1.651 m (5' 5\").    Weight as of this encounter: 67.6 kg (149 lb).  BP completed using cuff size: regular    Questioned patient about current smoking habits.  Pt. has never smoked.          The following HM Due: NONE      The following patient reported/Care Every where data was sent to:  P ABSTRACT QUALITY INITIATIVES [[id number]]        Liliya Wlaker [facility]                 "

## 2019-11-25 NOTE — Clinical Note
Surgeon: Dr Royal Purdysist:  Yes[facility]  Pt convenienceSurgery: Removal of previously placed InterStim system and insertion of new Complete InterStim system implantation with incision and implantation of tined quadripolar lead electrodes into foramen S3 with fluoroscopic guidance for needle placement subcutaneous implantation of sacral neural stimulator and electronic analysis and complex programming.Length of Surgery:  1 hrDiagnosis:  Rectal urgency unresponsive to conservative measuresAnesthesia type:  MACSpecial instructions / equipment:  Garry Gandara from [employer] to be present, fluroscopy needed for this procedureAm admit or same day: SAME DAYBowel prep: NoPre op: PCPOffice visit with surgeon prior to surgery: YesSchedule Post Op: 1-2 weeks

## 2019-11-25 NOTE — PATIENT INSTRUCTIONS
You can reach your [place] Care Team any time of the day by calling [phone]. This number will put you in touch with the 24 hour nurse line if the clinic is closed.    To contact your OB/GYN Station Coordinator/Surgery Scheduler please call [phone]. This is a direct number for your care team between 8 a.m. and 4 p.m. Monday through Friday.    [facility] is open for your convenience:  Monday through Friday 8 a.m. to 6 p.m.  Closed weekends and all major holidays.

## 2019-11-25 NOTE — TELEPHONE ENCOUNTER
Type of surgery: Removal of previously placed InterStim system and insertion of new Complete InterStim system implantation with incision and implantation of tined quadripolar lead electrodes into foramen S3 with fluoroscopic guidance for needle placement subcutaneous implantation of sacral neural stimulator and electronic analysis and complex programming  Location of surgery: [facility]  Date and time of surgery: 12/9/19 @ 12:00 pm  Surgeon: Dr. Ashton  Pre-Op Appt Date: Patient advised to schedule  Post-Op Appt Date: 12/17/19   Packet sent out: Yes  Pre-cert/Authorization completed:  No  Date: 11/25/19

## 2019-11-26 NOTE — PROGRESS NOTES
Seema Nielson is a 34 year old female  infrequent menses because of Mirena IUD for contraception, who presents for evaluation of constipation.  Largest child was 7 pounds 7 ounces a brow presentation and she states that she pushed for 5 hours.  Denies any instrumentation.  The patient states that approximately 12 years ago a urologist in  [facility] placed an InterStim device for  constipation.  The patient states that she was told that this was an off label use for this but it is helped her constipation.  The patient states that she had the InterStim device turned off during both of her pregnancies.  The patient states that she had a battery change in 2013.  The patient states that prior to having children she was not on any medications and had no difficulty with constipation.  Patient states that her constipation symptoms were not as bad between children but since the birth of her last child to become more prominent.  She also has urgency and occasional urge incontinence.  Rare symptoms of stress incontinence.  Denies any pain over the site of the implant fevers chills or other symptoms.  Patient states that she is comfortable turning it off and on.  At present she is using 2 Colace and MiraLAX daily for constipation this is not providing acceptable relief.   See the previous office visit notes from 10/4/2019 for further details   I have afsaneh Gandara the representative from [employer] here to see the patient again today.   At the previous visit the patient had only 6% battery life and the pulse generator remaining.  Impedance values at that time were within an acceptable range of therapy.  The patient was given additional program settings to try and presents today for follow-up.  The patient states that the first 2 settings did not improve her symptoms but on the third setting she is noticing improvement.  She states that her improvement is good but not quite to what it was before or  what she would like.  I had a lengthy discussion with the patient today regarding these findings and the risk benefits and alternative forms of therapy.  Pulse generator battery life is estimated at between 1 and 15%, not enough to last another year.  I reviewed with the patient the options that are available including #1 expectant management #2 replacing the pulse generator battery pack #3 replacing the entire InterStim system.  We discussed that a prick test would not be necessary as she is already had a proven benefit from the implant system.  We discussed the risk benefits and alternatives to lead removal including potentially the lead breaking.  We discussed pre-and postop instructions.  After this lengthy discussion the patient states that she like to have the old lead and pulse generator removed and a new SNS system reimplanted under fluoroscopic guidance and MAC anesthetic.  The patient would like to have this done before the end of the year for insurance reasons    Past Medical History:   Diagnosis Date     Abnormal Pap smear of cervix 2004    Treated with Cryo.  Normal paps since then.  Last pap 4/2016     Anxiety state, unspecified     On Zoloft, well controlled. Managed by Dr. Zhu/Marianne     Battery end of life of spinal cord stimulator      Constipation      Constipation      Depressive disorder     Anxiety     H/O: depression      History of vaccination against human papillomavirus      Migraine, unspecified, without mention of intractable migraine without mention of status migrainosus     Managed by Dr. Zhu/Marianne     OAB (overactive bladder)     interstim initial      Temporomandibular joint disorders, unspecified      TMJ (dislocation of temporomandibular joint) middle school    Has taken Zanaflex.  Has also had surgeries.     Urinary problem       ROS: 10 point ROS neg other than the symptoms noted above in the HPI.  Past Surgical History:   Procedure Laterality Date     APPENDECTOMY OPEN  2004      MANDIBLE SURGERY  2003, 2006    To treat TMJ (Done by oromaxillary sugeon).     NEURAL STIMULATOR DEVICE      Done by urologist in [facility] to treat constipation.     REMOVE STIMULATOR SACRAL NERVE  10/15/2013    Procedure: REMOVE STIMULATOR SACRAL NERVE;   REPLACEMENT NEURO STIMULATOR BATTERY ;  Surgeon: Deacon Casiano MD;  Location: [facility]     wisdom teeth      with jaw surgery     /66   Wt 67.6 kg (149 lb)   BMI 24.79 kg/m    Constitutional: healthy, alert and no distress    (K59.00) Constipation, unspecified constipation type  (primary encounter diagnosis)  Comment: Patient had a very good response to previously SNS   we will schedule Removal of the previous system and reimplantation of a new InterStim system implant  .  After reviewing alternative options she would like to proceed with removal of the previous implant and placement of a new lead and pulse generator.  Plan: Patient to see primary care for preop history and physical prior to surgery per protocol    (K59.9) Bowel dysfunction  Comment: Risks, benefits, and alternative modes of therapy discussed at length. Pathophysiology of the disease process reviewed, all of the patients questions answered and informed consent obtained.    Plan: I will see the patient just prior to surgery as well    (R15.2) Rectal urgency  Comment: As above  Plan: Symptoms well controlled with SNS implant    (N39.41) Urge incontinence of urine  Comment: As above  Plan: Symptoms well controlled with SNS implant  15 min spent w > 50% in counseling

## 2019-12-03 ENCOUNTER — MYC MEDICAL ADVICE (OUTPATIENT)
Dept: OBGYN | Facility: CLINIC | Age: 34
End: 2019-12-03

## 2019-12-04 ENCOUNTER — OFFICE VISIT (OUTPATIENT)
Dept: INTERNAL MEDICINE | Facility: CLINIC | Age: 34
End: 2019-12-04
Payer: COMMERCIAL

## 2019-12-04 VITALS
BODY MASS INDEX: 24.96 KG/M2 | WEIGHT: 150 LBS | DIASTOLIC BLOOD PRESSURE: 82 MMHG | SYSTOLIC BLOOD PRESSURE: 106 MMHG | RESPIRATION RATE: 16 BRPM | OXYGEN SATURATION: 98 % | TEMPERATURE: 98 F | HEART RATE: 65 BPM

## 2019-12-04 DIAGNOSIS — K59.9 BOWEL DYSFUNCTION: ICD-10-CM

## 2019-12-04 DIAGNOSIS — F41.1 GAD (GENERALIZED ANXIETY DISORDER): ICD-10-CM

## 2019-12-04 DIAGNOSIS — G43.009 MIGRAINE WITHOUT AURA AND WITHOUT STATUS MIGRAINOSUS, NOT INTRACTABLE: ICD-10-CM

## 2019-12-04 DIAGNOSIS — Z01.818 PREOP GENERAL PHYSICAL EXAM: Primary | ICD-10-CM

## 2019-12-04 DIAGNOSIS — R15.2 RECTAL URGENCY: ICD-10-CM

## 2019-12-04 PROCEDURE — 99214 OFFICE O/P EST MOD 30 MIN: CPT | Performed by: PHYSICIAN ASSISTANT

## 2019-12-04 NOTE — PROGRESS NOTES
[address] [address]  [phone]  Dept: [phone]    PRE-OP EVALUATION:  Today's date: 2019    Seema Nielson (: 1985) presents for pre-operative evaluation assessment as requested by Dr. Ashton.  She requires evaluation and anesthesia risk assessment prior to undergoing surgery/procedure for treatment of REMOVAL OF PREVIOUSLY PLACED INTERSTIM SYSTEM, INSERTION OF NEW COMPLETE INTERSTIM SYSTEM IMPLANTION WITH INCISION AND IMPLANTATION OF TINED QUADRIPOLAR LEAD  .    Fax number for surgical facility: [facility]  Primary Physician: Tay Lopes  Type of Anesthesia Anticipated: Local with MAC    Patient has a Health Care Directive or Living Will:  NO    Preop Questions 2019   Who is doing your surgery? Dr. Royal Ashton at [facility]   What are you having done? Interstim device replacement of battery and wire   Date of Surgery/Procedure:    Facility or Hospital where procedure/surgery will be performed: [facility]   1.  Do you have a history of Heart attack, stroke, stent, coronary bypass surgery, or other heart surgery? No   2.  Do you ever have any pain or discomfort in your chest? No   3.  Do you have a history of  Heart Failure? No   4.   Are you troubled by shortness of breath when:  walking on a level surface, or up a slight hill, or at night? No   5.  Do you currently have a cold, bronchitis or other respiratory infection? No   6.  Do you have a cough, shortness of breath, or wheezing? YES - Cough see and treated for bronchitis, just finished antibiotics and has albuterol inhaler to use for residual coughing    7.  Do you sometimes get pains in the calves of your legs when you walk? No   8. Do you or anyone in your family have previous history of blood clots? No   9.  Do you or does anyone in your family have a serious bleeding problem such as prolonged bleeding following surgeries or cuts? No    10. Have you ever had problems with anemia or been told to take iron pills? No   11. Have you had any abnormal blood loss such as black, tarry or bloody stools, or abnormal vaginal bleeding? No   12. Have you ever had a blood transfusion? No   13. Have you or any of your relatives ever had problems with anesthesia? No   14. Do you have sleep apnea, excessive snoring or daytime drowsiness? No   15. Do you have any prosthetic heart valves? No   16. Do you have prosthetic joints? No   17. Is there any chance that you may be pregnant? No         HPI:     HPI related to upcoming procedure: replacement of interstim /wiring etc. For rectal urgency, bowel dysfunction       See problem list for active medical problems.  Problems all longstanding and stable, except as noted/documented.  See ROS for pertinent symptoms related to these conditions.    DEPRESSION - Patient has a long history of Depression of moderate severity requiring medication for control with recent symptoms being stable..Current symptoms of depression include none.       MEDICAL HISTORY:     Patient Active Problem List    Diagnosis Date Noted     Rectal urgency 11/25/2019     Priority: Medium     Added automatically from request for surgery [id number]       Bowel dysfunction 10/06/2019     Priority: Medium     Urge incontinence of urine 09/18/2019     Priority: Medium     Mirena IUD inserted - remove on or before 6/27/2024 06/27/2019     Priority: Medium     Indication for care in labor or delivery 05/16/2019     Priority: Medium     Spontaneous vaginal delivery 05/16/2019     Priority: Medium     Recurrent major depression in complete remission (H) 02/26/2019     Priority: Medium     Panic attack 02/26/2019     Priority: Medium     Migraine without aura and without status migrainosus, not intractable 09/15/2016     Priority: Medium     Irregular menses 06/09/2016     Priority: Medium     RHETT (generalized anxiety disorder) 12/21/2015     Priority: Medium      On zoloft, managed by Dr Hurtado       TMJ (dislocation of temporomandibular joint)      Priority: Medium     Has had surgeries for this, since middle school  On zanaflex       Constipation      Priority: Medium      Past Medical History:   Diagnosis Date     Abnormal Pap smear of cervix 2004    Treated with Cryo.  Normal paps since then.  Last pap 4/2016     Anxiety state, unspecified     On Zoloft, well controlled. Managed by Dr. Zhu/Marianne     Battery end of life of spinal cord stimulator      Constipation      Constipation      Depressive disorder     Anxiety     H/O: depression      History of vaccination against human papillomavirus      Migraine, unspecified, without mention of intractable migraine without mention of status migrainosus     Managed by Dr. Zhu/Marianne     OAB (overactive bladder)     interstim initial      Temporomandibular joint disorders, unspecified      TMJ (dislocation of temporomandibular joint) middle school    Has taken Zanaflex.  Has also had surgeries.     Urinary problem      Past Surgical History:   Procedure Laterality Date     APPENDECTOMY OPEN  2004     MANDIBLE SURGERY  2003, 2006    To treat TMJ (Done by oromaxillary sugeon).     NEURAL STIMULATOR DEVICE      Done by urologist in [facility] to treat constipation.     REMOVE STIMULATOR SACRAL NERVE  10/15/2013    Procedure: REMOVE STIMULATOR SACRAL NERVE;   REPLACEMENT NEURO STIMULATOR BATTERY ;  Surgeon: Deacon Casiano MD;  Location: [facility]     wisdom teeth      with jaw surgery     Current Outpatient Medications   Medication Sig Dispense Refill     albuterol (PROAIR HFA/PROVENTIL HFA/VENTOLIN HFA) 108 (90 Base) MCG/ACT inhaler Inhale 2 puffs into the lungs every 6 hours 1 Inhaler 0     busPIRone (BUSPAR) 15 MG tablet Take 2 tablets (30 mg) by mouth 2 times daily 360 tablet 3     metoclopramide (REGLAN) 5 MG tablet Take 1 tablet (5 mg) by mouth 4 times daily as needed (for nausea and vomiting) 30 tablet 1      multivitamin, therapeutic with minerals (MULTI-VITAMIN) TABS tablet Take 1 tablet by mouth daily       order for DME Equipment being ordered: electric breast pump 1 Device 0     sertraline (ZOLOFT) 100 MG tablet TAKE 1 AND 1/2 TABLETS(150 MG) BY MOUTH DAILY 135 tablet 3     SUMAtriptan (IMITREX) 100 MG tablet Take 1 tablet (100 mg) by mouth at onset of headache for migraine Reported on 5/17/2017 30 tablet 3     OTC products: no recent use of OTC ASA, NSAIDS or Steroids    Allergies   Allergen Reactions     Thimerosal      Thorazine [Chlorpromazine]      Gets panic attacks per pt      Latex Allergy: NO    Social History     Tobacco Use     Smoking status: Never Smoker     Smokeless tobacco: Never Used   Substance Use Topics     Alcohol use: No     Alcohol/week: 0.0 standard drinks     Comment: One drink/week (prior to pregnancy)     History   Drug Use No       REVIEW OF SYSTEMS:   CONSTITUTIONAL: NEGATIVE for fever, chills, change in weight  INTEGUMENTARY/SKIN: NEGATIVE for worrisome rashes, moles or lesions  ENT/MOUTH: NEGATIVE for ear, mouth and throat problems  RESP: NEGATIVE for significant cough or SOB  CV: NEGATIVE for chest pain, palpitations or peripheral edema  MUSCULOSKELETAL: NEGATIVE for significant arthralgias or myalgia  HEME/ALLERGY/IMMUNE: NEGATIVE for bleeding problems  PSYCHIATRIC: NEGATIVE for changes in mood or affect  ROS otherwise negative    EXAM:   /82 (BP Location: Left arm, Patient Position: Chair, Cuff Size: Adult Regular)   Pulse 65   Temp 98  F (36.7  C) (Oral)   Resp 16   Wt 68 kg (150 lb)   SpO2 98%   BMI 24.96 kg/m    GENERAL APPEARANCE: healthy, alert and no distress  HENT: ear canals and TM's normal and nose and mouth without ulcers or lesions  RESP: lungs - slight wheeze heard. No rhonchi.   CV: regular rate and rhythm, normal S1 S2, no S3 or S4 and no murmur, click or rub   ABDOMEN: soft, nontender, no HSM or masses and bowel sounds normal  MS: extremities normal- no  gross deformities noted  SKIN: no suspicious lesions or rashes  NEURO: Normal strength and tone, sensory exam grossly normal, mentation intact and speech normal  PSYCH: mentation appears normal and affect normal/bright    DIAGNOSTICS:   No labs or EKG required for low risk surgery (cataract, skin procedure, breast biopsy, etc)    Recent Labs   Lab Test 09/13/19  0822 06/27/19  1106 03/08/19  1644  06/20/18  0919   HGB 13.9 14.0 11.3*   < > 14.0     --  147*   < > 185     --   --   --  139   POTASSIUM 4.1  --   --   --  4.1   CR 0.95  --   --   --  0.93    < > = values in this interval not displayed.        IMPRESSION:   Reason for surgery/procedure: rectal urgency, bowel dysfunction   Diagnosis/reason for consult: migraine, RHETT/depression.  Cardiopulmonary clearance for surgery     The proposed surgical procedure is considered LOW risk.    REVISED CARDIAC RISK INDEX  The patient has the following serious cardiovascular risks for perioperative complications such as (MI, PE, VFib and 3  AV Block):  No serious cardiac risks  INTERPRETATION: 0 risks: Class I (very low risk - 0.4% complication rate)    The patient has the following additional risks for perioperative complications:  No identified additional risks      ICD-10-CM    1. Preop general physical exam Z01.818    2. Rectal urgency R15.2    3. Bowel dysfunction K59.9    4. Migraine without aura and without status migrainosus, not intractable G43.009    5. RHETT (generalized anxiety disorder) F41.1        RECOMMENDATIONS:       Pulmonary Risk  Mild wheeze heard today. Encouraged use of albuterol for coughing and wheezing.   She is feeling better from bronchitis.         --Patient is to take all scheduled medications on the day of surgery    APPROVAL GIVEN to proceed with proposed procedure, without further diagnostic evaluation       Signed Electronically by: Emma Cruz PA-C    Copy of this evaluation report is provided to requesting  physician.    [facility] Preop Guidelines    Revised Cardiac Risk Index

## 2019-12-06 ASSESSMENT — MIFFLIN-ST. JEOR: SCORE: 1381.28

## 2019-12-07 NOTE — TELEPHONE ENCOUNTER
I left the patient a voicemail messageStating that she appears to be very comfortable with the preop instructions with the replacement of the InterStim device.  She has had a preop history and physical done.  At the time of our last office visit we had a very thorough discussion regarding the risk benefits and alternatives.  I am comfortable with not seeing her prior to her scheduled surgery on Tuesday, December 9, 2019.  All the patient's questions have been answered and informed consent has been obtained

## 2019-12-09 ENCOUNTER — APPOINTMENT (OUTPATIENT)
Dept: GENERAL RADIOLOGY | Facility: CLINIC | Age: 34
End: 2019-12-09
Attending: OBSTETRICS & GYNECOLOGY
Payer: COMMERCIAL

## 2019-12-09 ENCOUNTER — HOSPITAL ENCOUNTER (OUTPATIENT)
Facility: CLINIC | Age: 34
Discharge: HOME | End: 2019-12-09
Attending: OBSTETRICS & GYNECOLOGY | Admitting: OBSTETRICS & GYNECOLOGY
Payer: COMMERCIAL

## 2019-12-09 ENCOUNTER — ANESTHESIA (OUTPATIENT)
Dept: SURGERY | Facility: CLINIC | Age: 34
End: 2019-12-09
Payer: COMMERCIAL

## 2019-12-09 ENCOUNTER — ANESTHESIA EVENT (OUTPATIENT)
Dept: SURGERY | Facility: CLINIC | Age: 34
End: 2019-12-09
Payer: COMMERCIAL

## 2019-12-09 VITALS
TEMPERATURE: 97.4 F | DIASTOLIC BLOOD PRESSURE: 69 MMHG | HEART RATE: 60 BPM | OXYGEN SATURATION: 98 % | WEIGHT: 147 LBS | SYSTOLIC BLOOD PRESSURE: 111 MMHG | HEIGHT: 65 IN | RESPIRATION RATE: 16 BRPM | BODY MASS INDEX: 24.49 KG/M2

## 2019-12-09 DIAGNOSIS — Z98.890 POST-OPERATIVE STATE: Primary | ICD-10-CM

## 2019-12-09 DIAGNOSIS — R15.2 RECTAL URGENCY: ICD-10-CM

## 2019-12-09 LAB
HCG UR QL: NEGATIVE
HGB BLD-MCNC: 14.9 G/DL (ref 11.7–15.7)

## 2019-12-09 PROCEDURE — 64590 INS/RPL PRPH SAC/GSTR NPG/R: CPT | Performed by: OBSTETRICS & GYNECOLOGY

## 2019-12-09 PROCEDURE — C1767 GENERATOR, NEURO NON-RECHARG: HCPCS | Performed by: OBSTETRICS & GYNECOLOGY

## 2019-12-09 PROCEDURE — 27211024 ZZHC OR SUPPLY OTHER OPNP: Performed by: OBSTETRICS & GYNECOLOGY

## 2019-12-09 PROCEDURE — 25000125 ZZHC RX 250: Performed by: NURSE ANESTHETIST, CERTIFIED REGISTERED

## 2019-12-09 PROCEDURE — 64585 REV/RMV PERPH NSTIM ELTRD RA: CPT | Mod: 51 | Performed by: OBSTETRICS & GYNECOLOGY

## 2019-12-09 PROCEDURE — 27210794 ZZH OR GENERAL SUPPLY STERILE: Performed by: OBSTETRICS & GYNECOLOGY

## 2019-12-09 PROCEDURE — C1883 ADAPT/EXT, PACING/NEURO LEAD: HCPCS | Performed by: OBSTETRICS & GYNECOLOGY

## 2019-12-09 PROCEDURE — 25000128 H RX IP 250 OP 636: Performed by: OBSTETRICS & GYNECOLOGY

## 2019-12-09 PROCEDURE — 81025 URINE PREGNANCY TEST: CPT | Performed by: ANESTHESIOLOGY

## 2019-12-09 PROCEDURE — C1778 LEAD, NEUROSTIMULATOR: HCPCS | Performed by: OBSTETRICS & GYNECOLOGY

## 2019-12-09 PROCEDURE — 40000306 ZZH STATISTIC PRE PROC ASSESS II: Performed by: OBSTETRICS & GYNECOLOGY

## 2019-12-09 PROCEDURE — 25000128 H RX IP 250 OP 636: Performed by: NURSE ANESTHETIST, CERTIFIED REGISTERED

## 2019-12-09 PROCEDURE — 85018 HEMOGLOBIN: CPT | Performed by: ANESTHESIOLOGY

## 2019-12-09 PROCEDURE — 36000063 ZZH SURGERY LEVEL 4 EA 15 ADDTL MIN: Performed by: OBSTETRICS & GYNECOLOGY

## 2019-12-09 PROCEDURE — 37000009 ZZH ANESTHESIA TECHNICAL FEE, EACH ADDTL 15 MIN: Performed by: OBSTETRICS & GYNECOLOGY

## 2019-12-09 PROCEDURE — 25000125 ZZHC RX 250: Performed by: OBSTETRICS & GYNECOLOGY

## 2019-12-09 PROCEDURE — 25000125 ZZHC RX 250: Performed by: ANESTHESIOLOGY

## 2019-12-09 PROCEDURE — 40000277 XR SURGERY CARM FLUORO LESS THAN 5 MIN W STILLS: Mod: TC

## 2019-12-09 PROCEDURE — 36000065 ZZH SURGERY LEVEL 4 W FLUORO 1ST 30 MIN: Performed by: OBSTETRICS & GYNECOLOGY

## 2019-12-09 PROCEDURE — 25000128 H RX IP 250 OP 636: Performed by: ANESTHESIOLOGY

## 2019-12-09 PROCEDURE — 37000008 ZZH ANESTHESIA TECHNICAL FEE, 1ST 30 MIN: Performed by: OBSTETRICS & GYNECOLOGY

## 2019-12-09 PROCEDURE — 36415 COLL VENOUS BLD VENIPUNCTURE: CPT | Performed by: ANESTHESIOLOGY

## 2019-12-09 PROCEDURE — 71000027 ZZH RECOVERY PHASE 2 EACH 15 MINS: Performed by: OBSTETRICS & GYNECOLOGY

## 2019-12-09 PROCEDURE — 25800030 ZZH RX IP 258 OP 636: Performed by: ANESTHESIOLOGY

## 2019-12-09 DEVICE — LEAD INTERSTIM KIT 3889-28: Type: IMPLANTABLE DEVICE | Site: SACRUM | Status: FUNCTIONAL

## 2019-12-09 DEVICE — STIMULATOR NEURO INTER-STIM II 3058: Type: IMPLANTABLE DEVICE | Site: BACK | Status: FUNCTIONAL

## 2019-12-09 RX ORDER — OXYCODONE HYDROCHLORIDE 5 MG/1
5-10 TABLET ORAL EVERY 4 HOURS PRN
Qty: 10 TABLET | Refills: 0 | Status: SHIPPED | OUTPATIENT
Start: 2019-12-09 | End: 2019-12-17

## 2019-12-09 RX ORDER — FENTANYL CITRATE 50 UG/ML
25-50 INJECTION, SOLUTION INTRAMUSCULAR; INTRAVENOUS
Status: DISCONTINUED | OUTPATIENT
Start: 2019-12-09 | End: 2019-12-09 | Stop reason: HOSPADM

## 2019-12-09 RX ORDER — ONDANSETRON 2 MG/ML
4 INJECTION INTRAMUSCULAR; INTRAVENOUS EVERY 30 MIN PRN
Status: DISCONTINUED | OUTPATIENT
Start: 2019-12-09 | End: 2019-12-09 | Stop reason: HOSPADM

## 2019-12-09 RX ORDER — METOPROLOL TARTRATE 1 MG/ML
1-2 INJECTION, SOLUTION INTRAVENOUS EVERY 5 MIN PRN
Status: DISCONTINUED | OUTPATIENT
Start: 2019-12-09 | End: 2019-12-09 | Stop reason: HOSPADM

## 2019-12-09 RX ORDER — ONDANSETRON 4 MG/1
4 TABLET, ORALLY DISINTEGRATING ORAL EVERY 30 MIN PRN
Status: DISCONTINUED | OUTPATIENT
Start: 2019-12-09 | End: 2019-12-09 | Stop reason: HOSPADM

## 2019-12-09 RX ORDER — DIMENHYDRINATE 50 MG/ML
25 INJECTION, SOLUTION INTRAMUSCULAR; INTRAVENOUS
Status: DISCONTINUED | OUTPATIENT
Start: 2019-12-09 | End: 2019-12-09 | Stop reason: HOSPADM

## 2019-12-09 RX ORDER — CEFAZOLIN SODIUM 2 G/100ML
2 INJECTION, SOLUTION INTRAVENOUS
Status: COMPLETED | OUTPATIENT
Start: 2019-12-09 | End: 2019-12-09

## 2019-12-09 RX ORDER — ACETAMINOPHEN 325 MG/1
650 TABLET ORAL
Status: DISCONTINUED | OUTPATIENT
Start: 2019-12-09 | End: 2019-12-09 | Stop reason: HOSPADM

## 2019-12-09 RX ORDER — SODIUM CHLORIDE, SODIUM LACTATE, POTASSIUM CHLORIDE, CALCIUM CHLORIDE 600; 310; 30; 20 MG/100ML; MG/100ML; MG/100ML; MG/100ML
INJECTION, SOLUTION INTRAVENOUS CONTINUOUS
Status: DISCONTINUED | OUTPATIENT
Start: 2019-12-09 | End: 2019-12-09 | Stop reason: HOSPADM

## 2019-12-09 RX ORDER — HYDROMORPHONE HYDROCHLORIDE 1 MG/ML
.3-.5 INJECTION, SOLUTION INTRAMUSCULAR; INTRAVENOUS; SUBCUTANEOUS EVERY 10 MIN PRN
Status: DISCONTINUED | OUTPATIENT
Start: 2019-12-09 | End: 2019-12-09 | Stop reason: HOSPADM

## 2019-12-09 RX ORDER — ACETAMINOPHEN 325 MG/1
650 TABLET ORAL EVERY 4 HOURS PRN
Qty: 50 TABLET | Refills: 0 | Status: SHIPPED | OUTPATIENT
Start: 2019-12-09 | End: 2019-12-17

## 2019-12-09 RX ORDER — MEPERIDINE HYDROCHLORIDE 25 MG/ML
12.5 INJECTION INTRAMUSCULAR; INTRAVENOUS; SUBCUTANEOUS
Status: DISCONTINUED | OUTPATIENT
Start: 2019-12-09 | End: 2019-12-09 | Stop reason: HOSPADM

## 2019-12-09 RX ORDER — LIDOCAINE 40 MG/G
CREAM TOPICAL
Status: DISCONTINUED | OUTPATIENT
Start: 2019-12-09 | End: 2019-12-09 | Stop reason: HOSPADM

## 2019-12-09 RX ORDER — DEXAMETHASONE SODIUM PHOSPHATE 4 MG/ML
INJECTION, SOLUTION INTRA-ARTICULAR; INTRALESIONAL; INTRAMUSCULAR; INTRAVENOUS; SOFT TISSUE PRN
Status: DISCONTINUED | OUTPATIENT
Start: 2019-12-09 | End: 2019-12-09

## 2019-12-09 RX ORDER — PROPOFOL 10 MG/ML
INJECTION, EMULSION INTRAVENOUS CONTINUOUS PRN
Status: DISCONTINUED | OUTPATIENT
Start: 2019-12-09 | End: 2019-12-09

## 2019-12-09 RX ORDER — NALOXONE HYDROCHLORIDE 0.4 MG/ML
.1-.4 INJECTION, SOLUTION INTRAMUSCULAR; INTRAVENOUS; SUBCUTANEOUS
Status: DISCONTINUED | OUTPATIENT
Start: 2019-12-09 | End: 2019-12-09 | Stop reason: HOSPADM

## 2019-12-09 RX ORDER — ONDANSETRON 2 MG/ML
INJECTION INTRAMUSCULAR; INTRAVENOUS PRN
Status: DISCONTINUED | OUTPATIENT
Start: 2019-12-09 | End: 2019-12-09

## 2019-12-09 RX ORDER — OXYCODONE HYDROCHLORIDE 5 MG/1
5 TABLET ORAL
Status: DISCONTINUED | OUTPATIENT
Start: 2019-12-09 | End: 2019-12-09 | Stop reason: HOSPADM

## 2019-12-09 RX ORDER — FENTANYL CITRATE 50 UG/ML
INJECTION, SOLUTION INTRAMUSCULAR; INTRAVENOUS PRN
Status: DISCONTINUED | OUTPATIENT
Start: 2019-12-09 | End: 2019-12-09

## 2019-12-09 RX ORDER — ALBUTEROL SULFATE 0.83 MG/ML
2.5 SOLUTION RESPIRATORY (INHALATION) EVERY 4 HOURS PRN
Status: DISCONTINUED | OUTPATIENT
Start: 2019-12-09 | End: 2019-12-09 | Stop reason: HOSPADM

## 2019-12-09 RX ORDER — CEFAZOLIN SODIUM 1 G/3ML
1 INJECTION, POWDER, FOR SOLUTION INTRAMUSCULAR; INTRAVENOUS SEE ADMIN INSTRUCTIONS
Status: DISCONTINUED | OUTPATIENT
Start: 2019-12-09 | End: 2019-12-09 | Stop reason: HOSPADM

## 2019-12-09 RX ORDER — PROPOFOL 10 MG/ML
INJECTION, EMULSION INTRAVENOUS PRN
Status: DISCONTINUED | OUTPATIENT
Start: 2019-12-09 | End: 2019-12-09

## 2019-12-09 RX ADMIN — PROPOFOL 100 MCG/KG/MIN: 10 INJECTION, EMULSION INTRAVENOUS at 12:40

## 2019-12-09 RX ADMIN — SODIUM CHLORIDE, POTASSIUM CHLORIDE, SODIUM LACTATE AND CALCIUM CHLORIDE: 600; 310; 30; 20 INJECTION, SOLUTION INTRAVENOUS at 12:36

## 2019-12-09 RX ADMIN — PROPOFOL 30 MG: 10 INJECTION, EMULSION INTRAVENOUS at 12:48

## 2019-12-09 RX ADMIN — CEFAZOLIN SODIUM 2 G: 2 INJECTION, SOLUTION INTRAVENOUS at 12:42

## 2019-12-09 RX ADMIN — PROPOFOL 3 MG: 10 INJECTION, EMULSION INTRAVENOUS at 13:09

## 2019-12-09 RX ADMIN — LIDOCAINE HYDROCHLORIDE 30 MG: 10 INJECTION, SOLUTION EPIDURAL; INFILTRATION; INTRACAUDAL; PERINEURAL at 12:40

## 2019-12-09 RX ADMIN — FENTANYL CITRATE 50 MCG: 50 INJECTION, SOLUTION INTRAMUSCULAR; INTRAVENOUS at 12:48

## 2019-12-09 RX ADMIN — FENTANYL CITRATE 50 MCG: 50 INJECTION, SOLUTION INTRAMUSCULAR; INTRAVENOUS at 15:41

## 2019-12-09 RX ADMIN — FENTANYL CITRATE 50 MCG: 50 INJECTION, SOLUTION INTRAMUSCULAR; INTRAVENOUS at 14:57

## 2019-12-09 RX ADMIN — MIDAZOLAM 2 MG: 1 INJECTION INTRAMUSCULAR; INTRAVENOUS at 12:36

## 2019-12-09 RX ADMIN — DEXAMETHASONE SODIUM PHOSPHATE 4 MG: 4 INJECTION, SOLUTION INTRA-ARTICULAR; INTRALESIONAL; INTRAMUSCULAR; INTRAVENOUS; SOFT TISSUE at 12:40

## 2019-12-09 RX ADMIN — FENTANYL CITRATE 50 MCG: 50 INJECTION, SOLUTION INTRAMUSCULAR; INTRAVENOUS at 12:42

## 2019-12-09 RX ADMIN — ONDANSETRON HYDROCHLORIDE 4 MG: 2 INJECTION, SOLUTION INTRAVENOUS at 12:40

## 2019-12-09 RX ADMIN — LIDOCAINE HYDROCHLORIDE 20 MG: 10 INJECTION, SOLUTION EPIDURAL; INFILTRATION; INTRACAUDAL; PERINEURAL at 12:45

## 2019-12-09 RX ADMIN — SODIUM CHLORIDE, POTASSIUM CHLORIDE, SODIUM LACTATE AND CALCIUM CHLORIDE: 600; 310; 30; 20 INJECTION, SOLUTION INTRAVENOUS at 14:06

## 2019-12-09 ASSESSMENT — MIFFLIN-ST. JEOR: SCORE: 1367.67

## 2019-12-09 NOTE — ANESTHESIA CARE TRANSFER NOTE
Patient: Seema Ellerzler    Procedure(s):  REMOVAL OF PREVIOUSLY PLACED INTERSTIM SYSTEM, INSERTION OF NEW COMPLETE INTERSTIM SYSTEM IMPLANTION WITH INCISION AND IMPLANTATION OF TINED QUADRIPOLAR LEAD ELECTRODES INTO FORAMEN S3 WITH FLUORSOCOPIC GUIDANCE FOR NEEDLE PLACEMENT SUBCUTANEOUS IMPLANTATION OF SACRAL NEURAL STIMULATOR AND ELECTRONIC ANALYSYS AND COMPLEX PROGRAMMING.    Diagnosis: Rectal urgency [R15.2]  Diagnosis Additional Information: No value filed.    Anesthesia Type:   MAC     Note:  Airway :Room Air  Patient transferred to:Phase II  Handoff Report: Identifed the Patient, Identified the Reponsible Provider, Reviewed the pertinent medical history, Discussed the surgical course, Reviewed Intra-OP anesthesia mangement and issues during anesthesia, Set expectations for post-procedure period and Allowed opportunity for questions and acknowledgement of understanding      Vitals: (Last set prior to Anesthesia Care Transfer)    CRNA VITALS  12/9/2019 1344 - 12/9/2019 1423      12/9/2019             Pulse:  71    SpO2:  98 %                Electronically Signed By: ROSANNE Pisano CRNA  December 9, 2019  2:23 PM

## 2019-12-09 NOTE — OP NOTE
Procedure Date: 12/09/2019      PREOPERATIVE DIAGNOSES:  Urinary urgency with mixed urinary incontinence, bowel dysfunction with constipation, treated successfully in the past with sacral nerve stimulation with the InterStim device, now in need of the lead and pulse generator replacement.      POSTOPERATIVE DIAGNOSES:  Urinary urgency with mixed urinary incontinence, bowel dysfunction with constipation, treated successfully in the past with sacral nerve stimulation with the InterStim device, now in need of the lead and pulse generator replacement.      PROCEDURE:  Removal of previously placed InterStim system and insertion of a new complete InterStim system implantation with incision and implantation of tined quadripolar leads into the right S3 foramen with fluoroscopic guidance for needle placement and subcutaneous implantation of sacral nerve stimulator, electronic analysis and complex programming.      SURGEON:  Royal Ashton MD      ASSISTANT:  Suzi Saldana CSA      HISTORY OF PRESENT ILLNESS:  The patient is a 34-year-old white female, para 2-0-0-2, Mirena IUD for contraception with a negative pregnancy test today, whom I was initially asked to see for evaluation of constipation.  The patient states that approximately 12 years ago urologist in [place], [place] placed into the InterStim device for constipation.  The patient states that she was told this is an off-label use, but it has helped her constipation tremendously.  The patient states that she has had the InterStim device turned off during both of her pregnancies.  The patient states that she had a battery change in 04/2013.  Most recently, she states that prior to having children, she was not on any medication, had no difficulty with constipation but since the birth of her last child, her symptoms have become more prominent and an interference in her lifestyle.  She also notices urinary urgency and occasional urge incontinence with rare  symptoms of stress incontinence.  I have had the [employer] representative, Garry Gandara, interrogate the unit and found that there was 6% of battery life and pulse generator remaining.  Impedance values at that time were within an acceptable range.  The patient was given additional programs to try.  She states that the first 2 sittings did not improve her symptoms, but with the third setting, she had some improvement, although not as much as she had previously before.  The fourth setting did not provide her with relief.  I reviewed with her on multiple occasions the findings.  We discussed with her the risks, benefits and alternative forms of therapy.  We discussed the indications for the InterStim sacral neurostimulation device, the off-label use and the risks, benefits and alternative forms of therapy.  We discussed that, at this point, we would remove the previously placed leads and pulse generator as she was not getting an optimal response and they have been present on her previous pregnancies.  We also discussed the possibility of the previously placed chronic lead breaking and the implications for future MRI testing.  I think the patient also has a good understanding of the nature of the problem, the nature of the procedure, and she states she would like to have the old lead and pulse generator removed and a new sacral nerve stimulation system reimplanted under fluoroscopic guidance with MAC anesthetic.  I think she has a good understanding of the nature of the problem, the nature of the procedure, the risks, the benefits and the alternatives.  All of her questions have been answered and informed consent has been obtained.  Realistic expectations were reviewed, cares postoperatively have also been reviewed.      DETAILS OF PROCEDURE:  After obtaining informed consent, the patient was taken to the operating room.  The patient was properly identified and placed in a prone position per OR protocol.  A hard stop  was performed.  MAC anesthetic was utilized.  Pillows were placed under the lower abdomen to flatten the sacrum and under the shins to allow the toes to dangle freely.  The patient was then prepped and draped in the usual sterile fashion using a DuraPrep-type solution.  The patient was prepped and draped, as mentioned above.  The C-arm was draped and moved into an AP position to provide fluoroscopic mapping of the sacral region included marking out the outline of the sacrum, the SI joints, the sciatic notches, the medial foraminal borders and sacral foramina.  The C-arm was moved into a lateral position to image the area from the sacral promontory to the coccyx.  Local injection of approximately 5 mL of 1% lidocaine with buffered bicarbonate was then administered.        A 3.5 size foramen needle was then introduced approximately 2 cm above the sciatic notch and approximately 2 cm lateral to the sacral midline, feeling for the foraminal margins until the right S3 foramen was identified and penetrated.  The depth of the foramen needle was confirmed and adjusted fluoroscopically.  Proper needle position was confirmed by identification of location of sensation, direct observation of lifting of the perineum or kerline and observation of plantar flexion of the great toe using the external test stimulator.  I had previously placed a 3.5 cm foramen needle into the left S3 foramen, but was unable to achieve acceptable neurologic response, hence we made a decision to proceed with the right S3 foramen.  The neurologic responses were much improved on the patient's right side .  The foramen needle stylet was removed, and the directional guide was placed and confirmed fluoroscopically.  The foramen needle was removed and the incision was then made peripheral to the directional guide through the fascial layer and the lead introducer sheath with the dilator was placed over the directional guide and directed into the foramen to the  proper depth, identified with the radiopaque marker.  The lead introducer did not extend beyond the anterior edge of the sacrum.  The dilator was unlocked and removed along with the directional guide.  The quadripolar lead was then placed through the inner sheath to the first white line.  Position was checked fluoroscopically.  The lead was then further introduced until 3 electrodes were visible below the sacrum.  Each lead was tested for location by the patient's sensation, visualization of kerline response and plantar flexion of the great toe.        After satisfactory position was confirmed, radiographic evidence was taken and the introducer sheath was retracted under continuous fluoroscopy, deploying the lead tines into the preperisacral tissue.  Further incision was made into the subcuticular tissue posterior to the iliac crest lateral to the sacrum.  We infiltrated the skin and subcutaneous tissues with approximately 5-7 mL of 1% lidocaine with buffered bicarbonate solution.  Blunt dissection was continued until the previously placed pulse generator was identified.  There was a dense fibrous capsule around this pulse generator.  The fibrous capsule was meticulously incised and dissected off the underlying pulse generator, allowing us to exteriorize the generator.  The chronic lead was identified and was removed from the pulse generator using the torque wrench.  I was then able to identify the previous exit site of the chronic lead on the right side.  I attempted to withdraw the chronic lead to the subcutaneous tissue but, because of the dense scar tissue, I was unable to do so.  I then cut the chronic lead and removed the lateral portion of the lead intact.  Then, using gentle traction in a circular motion, I attempted to remove the previously placed quadripolar chronic lead.  This lead was very densely adherent into the perisacral fascial region.  In the process of doing this, the lead broke.  The wire core  of the lead was removed but the distal end of the quadripolar tined lead was still deep within the S3 space.  It was not easily retrievable.  Following this, the tunneling tool with straw was then placed from the lead site subcutaneously to the inside pocket site.  The tunneling tool was removed and the lead was fed through the straw and pulled out through the pocket site.  The lead was cleansed of body fluid and dried.  The lead was inserted into the InterStim 2 neurosacral modulator and the metal band aligned with the blue lead tip clearly visible in the distal portion of the nerve stimulator header.  The single set screw was then tightened with a hex wrench to the prescribed amount.  The neurostimulator was then placed in the subcutaneous pocket with the device identification side facing outwards.  Excess lead was wrapped counterclockwise under the neurostimulator in the superior portion of the pocket.  The programming head was then placed over the implanted neurostimulator in the sterile field to ensure adequate lead connection and the parameters were all within normal limits.  Impedance was confirmed to be within normal limits and was greater than 50 and less than 4000.  All 4 leads on the chronic lead had acceptable kerline response with very low settings.        The wounds were irrigated with copious amounts of vancomycin antibiotic solution and water.  Interrupted sutures of 2-0 Vicryl were used to close the subcuticular fat layers over the pulse generator.  The skin was closed with 4-0 Monocryl in a subcuticular suture manner and dressed with Dermabond.  Sponge and needle counts were checked and were correct as instrument counts and they were documented to be correct x2.  Hemostasis was checked and found to be correct.  With counts correct and duplicated x2, Steri-Strips and Dermabond were placed over the incision and the incision was dressed with gauze.  Estimated blood loss approximately 5 mL.        The  patient was transferred to the recovery area in good condition.  There were no difficulties or complications.  Using the clinician , the patient was programmed to the electrode of optimum sensation and given instructions on utilizing the patient  prior to discharge.  Complex programming of the neurostimulator was performed and final electrode selections were set.         ROYAL ASHTON MD             D: 2019   T: 2019   MT: PK      Name:     CINTHIA CASTRO   MRN:      [id number]        Account:        [id number]   :      1985           Procedure Date: 2019      Document: [id number]       cc: Royal Ashton MD

## 2019-12-09 NOTE — ANESTHESIA PREPROCEDURE EVALUATION
Anesthesia Pre-Procedure Evaluation    Patient: Seema Nielson   MRN: [id number] : 1985          Preoperative Diagnosis: Rectal urgency [R15.2]    Procedure(s):  REMOVAL OF PREVIOUSLY PLACED INTERSTIM SYSTEM, INSERTION OF NEW COMPLETE INTERSTIM SYSTEM IMPLANTION WITH INCISION AND IMPLANTATION OF TINED QUADRIPOLAR LEAD ELECTRODES INTO FORAMEN S3 WITH FLUORSOCOPIC GUIDANCE FOR NEEDLE PLACEMENT SUBCUTANEOUS IMPLANTATION OF SACRAL NEURAL STIMULATOR AND ELECTRONIC ANALYSYS AND COMPLEX PROGRAMMING.    Past Medical History:   Diagnosis Date     Abnormal Pap smear of cervix     Treated with Cryo.  Normal paps since then.  Last pap 2016     Anxiety state, unspecified     On Zoloft, well controlled. Managed by Dr. Zhu/Marianne     Battery end of life of spinal cord stimulator      Constipation      Constipation      Depressive disorder     Anxiety     H/O: depression      History of vaccination against human papillomavirus      Migraine, unspecified, without mention of intractable migraine without mention of status migrainosus     Managed by Dr. Zhu/Marianne     OAB (overactive bladder)     interstim initial      Temporomandibular joint disorders, unspecified      TMJ (dislocation of temporomandibular joint) middle school    Has taken Zanaflex.  Has also had surgeries.     Urinary problem      Past Surgical History:   Procedure Laterality Date     APPENDECTOMY OPEN       MANDIBLE SURGERY  ,     To treat TMJ (Done by oromaxillary sugeon).     NEURAL STIMULATOR DEVICE      Done by urologist in [facility] to treat constipation.     REMOVE STIMULATOR SACRAL NERVE  10/15/2013    Procedure: REMOVE STIMULATOR SACRAL NERVE;   REPLACEMENT NEURO STIMULATOR BATTERY ;  Surgeon: Deacon Casiano MD;  Location: [facility]     wisdom teeth      with jaw surgery     Anesthesia Evaluation     . Pt has had prior anesthetic. Type: General and MAC    No history of anesthetic complications          ROS/MED  HX    ENT/Pulmonary:  - neg pulmonary ROS     Neurologic:  - neg neurologic ROS     Cardiovascular:  - neg cardiovascular ROS       METS/Exercise Tolerance:     Hematologic:  - neg hematologic  ROS       Musculoskeletal:  - neg musculoskeletal ROS       GI/Hepatic:  - neg GI/hepatic ROS       Renal/Genitourinary:     (+) Other Renal/ Genitourinary, bladder spasm and incontinence      Endo:  - neg endo ROS       Psychiatric:     (+) psychiatric history anxiety and depression      Infectious Disease:  - neg infectious disease ROS       Malignancy:      - no malignancy   Other:    - neg other ROS                      Physical Exam  Normal systems: cardiovascular, pulmonary and dental    Airway   Mallampati: II  TM distance: >3 FB  Neck ROM: full    Dental     Cardiovascular   Rhythm and rate: regular and normal      Pulmonary    breath sounds clear to auscultation    Other findings: Lab Test        12/09/19     09/13/19     06/27/19     03/08/19     10/29/18                       1020          0822          1106          1644          1433          WBC           --          5.1           --          6.4          6.6           HGB          14.9         13.9         14.0         11.3*        11.8          MCV           --          91            --          94           90            PLT           --          215           --          147*         191            Lab Test        09/13/19     06/20/18     06/27/17     09/15/16                       0822          0919          0956          0749          NA           140          139           --          137           POTASSIUM    4.1          4.1           --          4.4           CHLORIDE     106          104           --          104           CO2          29           30            --          26            BUN          14           19            --          11            CR           0.95         0.93          --          0.78          ANIONGAP     5            5       "       --          7             ORACIO          9.5          9.3           --          9.1           GLC          84           80           83           80                           Lab Results   Component Value Date    WBC 5.1 09/13/2019    HGB 14.9 12/09/2019    HCT 41.2 09/13/2019     09/13/2019     09/13/2019    POTASSIUM 4.1 09/13/2019    CHLORIDE 106 09/13/2019    CO2 29 09/13/2019    BUN 14 09/13/2019    CR 0.95 09/13/2019    GLC 84 09/13/2019    ORACIO 9.5 09/13/2019    ALBUMIN 3.9 09/15/2016    PROTTOTAL 7.0 09/15/2016    ALT 23 06/20/2018    AST 9 09/15/2016    ALKPHOS 52 09/15/2016    BILITOTAL 0.6 09/15/2016    TSH 2.08 06/20/2018    HCG Negative 12/09/2019    HCGS Negative 07/09/2016       Preop Vitals  BP Readings from Last 3 Encounters:   12/09/19 106/71   12/04/19 106/82   11/25/19 106/66    Pulse Readings from Last 3 Encounters:   12/09/19 64   12/04/19 65   09/11/19 78      Resp Readings from Last 3 Encounters:   12/09/19 16   12/04/19 16   09/11/19 18    SpO2 Readings from Last 3 Encounters:   12/09/19 98%   12/04/19 98%   09/11/19 97%      Temp Readings from Last 1 Encounters:   12/09/19 98.4  F (36.9  C) (Temporal)    Ht Readings from Last 1 Encounters:   12/09/19 1.651 m (5' 5\")      Wt Readings from Last 1 Encounters:   12/09/19 66.7 kg (147 lb)    Estimated body mass index is 24.46 kg/m  as calculated from the following:    Height as of this encounter: 1.651 m (5' 5\").    Weight as of this encounter: 66.7 kg (147 lb).       Anesthesia Plan      History & Physical Review  History and physical reviewed and following examination; no interval change.    ASA Status:  2 .    NPO Status:  > 8 hours    Plan for MAC with Intravenous induction. Maintenance will be TIVA.  Reason for MAC:  Chronic cardiopulmonary disease (G9), Difficulty with conscious sedation (QS), Deep or markedly invasive procedure (G8) and Extreme anxiety (QS)  PONV prophylaxis:  Ondansetron (or other 5HT-3) and " Dexamethasone or Solumedrol       Postoperative Care  Postoperative pain management:  IV analgesics, Oral pain medications and Multi-modal analgesia.      Consents  Anesthetic plan, risks, benefits and alternatives discussed with:  Patient.  Use of blood products discussed: No .   .                 Jamal Benítez MD                    .

## 2019-12-09 NOTE — ANESTHESIA POSTPROCEDURE EVALUATION
Patient: Seema Ellerzler    Procedure(s):  REMOVAL OF PREVIOUSLY PLACED INTERSTIM SYSTEM, INSERTION OF NEW COMPLETE INTERSTIM SYSTEM IMPLANTION WITH INCISION AND IMPLANTATION OF TINED QUADRIPOLAR LEAD ELECTRODES INTO FORAMEN S3 WITH FLUORSOCOPIC GUIDANCE FOR NEEDLE PLACEMENT SUBCUTANEOUS IMPLANTATION OF SACRAL NEURAL STIMULATOR AND ELECTRONIC ANALYSYS AND COMPLEX PROGRAMMING.    Diagnosis:Rectal urgency [R15.2]  Diagnosis Additional Information: No value filed.    Anesthesia Type:  MAC    Note:  Anesthesia Post Evaluation    Patient location during evaluation: Phase 2  Patient participation: Able to fully participate in evaluation  Level of consciousness: awake  Pain management: adequate  Airway patency: patent  Cardiovascular status: acceptable  Respiratory status: acceptable  Hydration status: acceptable  PONV: none             Last vitals:  Vitals:    12/09/19 1020 12/09/19 1426   BP: 106/71 112/69   Pulse: 64 69   Resp: 16 14   Temp: 98.4  F (36.9  C) 98.1  F (36.7  C)   SpO2: 98% 97%         Electronically Signed By: Jamal Benítez MD  December 9, 2019  2:33 PM

## 2019-12-09 NOTE — PROGRESS NOTES
Preop Diagnosis: urinary urgency with mixed urinary incontinence. Bowel dysfunction with constipation treated successfully with Sacral nerve stimulation with the InterStim device, now in need of lead and pulse generator replacement    Post-Op Diagnosis: same    Procedure:   REMOVAL OF PREVIOUSLY PLACED INTERSTIM SYSTEM, INSERTION OF NEW COMPLETE INTERSTIM SYSTEM IMPLANTION WITH INCISION AND IMPLANTATION OF TINED QUADRIPOLAR LEAD ELECTRODES INTO FORAMEN S3 WITH FLUORSOCOPIC GUIDANCE FOR NEEDLE PLACEMENT SUBCUTANEOUS IMPLANTATION OF SACRAL NEURAL STIMULATOR AND ELECTRONIC ANALYSYS AND COMPLEX PROGRAMMING. N         Surgeon: SEAN CHEUNG MD, Assistant: Suzi Saldana CFA    EBL: 5 cc    Anesthesia: Local with MAC    Path: none    Complications: no problems reported    Findings: see dictated operative note for full details        SEAN CHEUNG MD  2:05 PM  12/9/2019

## 2019-12-17 ENCOUNTER — TRANSFERRED RECORDS (OUTPATIENT)
Dept: HEALTH INFORMATION MANAGEMENT | Facility: CLINIC | Age: 34
End: 2019-12-17

## 2019-12-17 ENCOUNTER — OFFICE VISIT (OUTPATIENT)
Dept: OBGYN | Facility: CLINIC | Age: 34
End: 2019-12-17
Payer: COMMERCIAL

## 2019-12-17 VITALS — WEIGHT: 149 LBS | SYSTOLIC BLOOD PRESSURE: 110 MMHG | BODY MASS INDEX: 24.79 KG/M2 | DIASTOLIC BLOOD PRESSURE: 64 MMHG

## 2019-12-17 DIAGNOSIS — K59.00 CONSTIPATION, UNSPECIFIED CONSTIPATION TYPE: ICD-10-CM

## 2019-12-17 DIAGNOSIS — Z98.890 POSTOPERATIVE STATE: Primary | ICD-10-CM

## 2019-12-17 DIAGNOSIS — N39.41 URGE INCONTINENCE OF URINE: ICD-10-CM

## 2019-12-17 PROCEDURE — 99024 POSTOP FOLLOW-UP VISIT: CPT | Performed by: OBSTETRICS & GYNECOLOGY

## 2019-12-17 NOTE — NURSING NOTE
"Chief Complaint   Patient presents with     Post-op Visit       Initial /64   Wt 67.6 kg (149 lb)   BMI 24.79 kg/m   Estimated body mass index is 24.79 kg/m  as calculated from the following:    Height as of 19: 1.651 m (5' 5\").    Weight as of this encounter: 67.6 kg (149 lb).  BP completed using cuff size: regular    Questioned patient about current smoking habits.  Pt. has never smoked.          The following HM Due: NONE      The following patient reported/Care Every where data was sent to:  P ABSTRACT QUALITY INITIATIVES [[id number]]        Liliya Walker CMA                 "

## 2019-12-17 NOTE — PATIENT INSTRUCTIONS
You can reach your [facility] Care Team any time of the day by calling [phone]. This number will put you in touch with the 24 hour nurse line if the clinic is closed.    To contact your OB/GYN Station Coordinator/Surgery Scheduler please call [phone]. This is a direct number for your care team between 8 a.m. and 4 p.m. Monday through Friday.    [facility] is open for your convenience:  Monday through Friday 8 a.m. to 6 p.m.  Closed weekends and all major holidays.

## 2019-12-18 NOTE — PROGRESS NOTES
The patient is a 34-year-old white female, para 2-0-0-2, Mirena IUD for contraception with a negative pregnancy test today, whom I was initially asked to see for evaluation of constipation.  The patient states that approximately 12 years ago urologist in [place], [place] placed into the InterStim device for constipation.  The patient states that she was told this is an off-label use, but it has helped her constipation tremendously.  The patient states that she has had the InterStim device turned off during both of her pregnancies.  The patient states that she had a battery change in 04/2013.  Most recently, she states that prior to having children, she was not on any medication, had no difficulty with constipation but since the birth of her last child, her symptoms have become more prominent and an interference in her lifestyle.  She also notices urinary urgency and occasional urge incontinence with rare symptoms of stress incontinence.  I have had the [employer] representative, Garry Gandara, interrogate the unit and found that there was 6% of battery life and pulse generator remaining.  Impedance values at that time were within an acceptable range.  The patient was given additional programs to try.  She states that the first 2 sittings did not improve her symptoms, but with the third setting, she had some improvement, although not as much as she had previously before.  The fourth setting did not provide her with relief.  I reviewed with her on multiple occasions the findings.  We discussed with her the risks, benefits and alternative forms of therapy.  We discussed the indications for the InterStim sacral neurostimulation device, the off-label use and the risks, benefits and alternative forms of therapy.  We discussed that, at this point, we would remove the previously placed leads and pulse generator as she was not getting an optimal response and they have been present on her previous pregnancies.  We also  discussed the possibility of the previously placed chronic lead breaking and the implications for future MRI testing.  I think the patient also has a good understanding of the nature of the problem, the nature of the procedure, and she states she would like to have the old lead and pulse generator removed and a new sacral nerve stimulation system reimplanted under fluoroscopic guidance with MAC anesthetic.  This was done on 12/9/2019.  Please see the operative note for full details.  The patient presents today for a postop follow-up visit.  She is doing well without concerns.  She has turned her pulse generator back on and is experiencing good results.  She is having minimal pain and no fevers chills or signs of concern.    Past Medical History:   Diagnosis Date     Abnormal Pap smear of cervix 2004    Treated with Cryo.  Normal paps since then.  Last pap 4/2016     Anxiety state, unspecified     On Zoloft, well controlled. Managed by Dr. Zhu/Marianne     Battery end of life of spinal cord stimulator      Constipation      Constipation      Depressive disorder     Anxiety     H/O: depression      History of vaccination against human papillomavirus      Migraine, unspecified, without mention of intractable migraine without mention of status migrainosus     Managed by Dr. Zhu/Marianne     OAB (overactive bladder)     interstim initial      Temporomandibular joint disorders, unspecified      TMJ (dislocation of temporomandibular joint) middle school    Has taken Zanaflex.  Has also had surgeries.     Urinary problem      Current Outpatient Medications   Medication     albuterol (PROAIR HFA/PROVENTIL HFA/VENTOLIN HFA) 108 (90 Base) MCG/ACT inhaler     metoclopramide (REGLAN) 5 MG tablet     multivitamin, therapeutic with minerals (MULTI-VITAMIN) TABS tablet     sertraline (ZOLOFT) 100 MG tablet     SUMAtriptan (IMITREX) 100 MG tablet     No current facility-administered medications for this visit.      /64   Wt  67.6 kg (149 lb)   BMI 24.79 kg/m    Constitutional: healthy, alert and no distress  Gastrointestinal: Abdomen soft, non-tender. BS normal. No masses, organomegaly incisions over the pulse generator site and chronic lead site are clean and intact and healing well without hematoma or signs or symptoms of concern    (Z98.890) Postoperative state  (primary encounter diagnosis)  Comment: I discussed with the patient maintaining lifting restrictions for a month post op she is going to gradually wean off of her bowel regimen and use the sacral neuromodulation device and call for any concerns in the interval otherwise we will see her back in 3 to 6 months for follow-up and recheck  Plan: Written plan reviewed    (K59.00) Constipation, unspecified constipation type  Comment: As above  Plan: As above    (N39.41) Urge incontinence of urine  Comment: Urinary urgency symptoms under good control with the InterStim 2 device  Plan: as abiove

## 2020-01-09 ENCOUNTER — TRANSFERRED RECORDS (OUTPATIENT)
Dept: HEALTH INFORMATION MANAGEMENT | Facility: CLINIC | Age: 35
End: 2020-01-09

## 2020-02-03 ENCOUNTER — TRANSFERRED RECORDS (OUTPATIENT)
Dept: HEALTH INFORMATION MANAGEMENT | Facility: CLINIC | Age: 35
End: 2020-02-03

## 2020-02-24 ENCOUNTER — OFFICE VISIT (OUTPATIENT)
Dept: INTERNAL MEDICINE | Facility: CLINIC | Age: 35
End: 2020-02-24
Payer: COMMERCIAL

## 2020-02-24 VITALS
HEIGHT: 65 IN | BODY MASS INDEX: 24.04 KG/M2 | SYSTOLIC BLOOD PRESSURE: 97 MMHG | OXYGEN SATURATION: 93 % | DIASTOLIC BLOOD PRESSURE: 60 MMHG | HEART RATE: 105 BPM | RESPIRATION RATE: 16 BRPM | WEIGHT: 144.3 LBS | TEMPERATURE: 100.3 F

## 2020-02-24 DIAGNOSIS — R52 BODY ACHES: Primary | ICD-10-CM

## 2020-02-24 LAB
FLUAV+FLUBV AG SPEC QL: NEGATIVE
FLUAV+FLUBV AG SPEC QL: NEGATIVE
SPECIMEN SOURCE: NORMAL

## 2020-02-24 PROCEDURE — 87804 INFLUENZA ASSAY W/OPTIC: CPT | Performed by: NURSE PRACTITIONER

## 2020-02-24 PROCEDURE — 99213 OFFICE O/P EST LOW 20 MIN: CPT | Performed by: NURSE PRACTITIONER

## 2020-02-24 RX ORDER — PROPRANOLOL HYDROCHLORIDE 20 MG/1
20 TABLET ORAL DAILY
COMMUNITY
End: 2020-02-24 | Stop reason: ALTCHOICE

## 2020-02-24 RX ORDER — BUSPIRONE HYDROCHLORIDE 30 MG/1
15 TABLET ORAL 2 TIMES DAILY
COMMUNITY
End: 2021-08-11

## 2020-02-24 ASSESSMENT — MIFFLIN-ST. JEOR: SCORE: 1355.42

## 2020-02-24 NOTE — PROGRESS NOTES
Subjective     Seema Nielson is a 34 year old female who presents to clinic today for the following health issues:    HPI   RESPIRATORY SYMPTOMS      Duration: 1 day    Description  cough, fever to 101, chills, headache, fatigue/malaise, myalgias and nausea    Severity: moderate    Accompanying signs and symptoms: None    History (predisposing factors):  none    Precipitating or alleviating factors: None    Therapies tried and outcome:  rest and fluids    Stared propanolol 3 days ago for migraine prophylaxis  Previously BP in  Low normal range.     Patient Active Problem List   Diagnosis     TMJ (dislocation of temporomandibular joint)     Constipation     RHETT (generalized anxiety disorder)     Irregular menses     Migraine without aura and without status migrainosus, not intractable     Recurrent major depression in complete remission (H)     Panic attack     Indication for care in labor or delivery     Spontaneous vaginal delivery     Mirena IUD inserted - remove on or before 6/27/2024     Urge incontinence of urine     Bowel dysfunction     Rectal urgency     Past Surgical History:   Procedure Laterality Date     APPENDECTOMY OPEN  2004     IMPLANT STIMULATOR SACRAL NERVE STAGE ONE N/A 12/9/2019    Procedure: REMOVAL OF PREVIOUSLY PLACED INTERSTIM SYSTEM, INSERTION OF NEW COMPLETE INTERSTIM SYSTEM IMPLANTION WITH INCISION AND IMPLANTATION OF TINED QUADRIPOLAR LEAD ELECTRODES INTO FORAMEN S3 WITH FLUORSOCOPIC GUIDANCE FOR NEEDLE PLACEMENT SUBCUTANEOUS IMPLANTATION OF SACRAL NEURAL STIMULATOR AND ELECTRONIC ANALYSYS AND COMPLEX PROGRAMMING.;  Surgeon: Royal Ashton MD;  Location: [facility] OR     MANDIBLE SURGERY  2003, 2006    To treat TMJ (Done by oromaxillary sugeon).     NEURAL STIMULATOR DEVICE      Done by urologist in [facility] to treat constipation.     REMOVE STIMULATOR SACRAL NERVE  10/15/2013    Procedure: REMOVE STIMULATOR SACRAL NERVE;   REPLACEMENT NEURO STIMULATOR BATTERY ;  Surgeon: Stephenie  "Deacon You MD;  Location: [facility]     wisdom teeth      with jaw surgery       Social History     Tobacco Use     Smoking status: Never Smoker     Smokeless tobacco: Never Used   Substance Use Topics     Alcohol use: No     Alcohol/week: 0.0 standard drinks     Comment: One drink/week (prior to pregnancy)     Family History   Problem Relation Age of Onset     Hypertension Mother         Born 1953     Leukemia Father      Breast Cancer Paternal Grandmother 50     Hypertension Paternal Grandmother      Osteoporosis Paternal Grandmother      Hyperlipidemia Maternal Grandmother      Hypertension Maternal Grandmother      Cerebrovascular Disease Maternal Grandmother 80     Osteoporosis Paternal Aunt          Current Outpatient Medications   Medication Sig Dispense Refill     busPIRone HCl (BUSPAR) 30 MG tablet Take 15 mg by mouth 2 times daily       multivitamin, therapeutic with minerals (MULTI-VITAMIN) TABS tablet Take 1 tablet by mouth daily       sertraline (ZOLOFT) 100 MG tablet TAKE 1 AND 1/2 TABLETS(150 MG) BY MOUTH DAILY 135 tablet 3     SUMAtriptan (IMITREX) 100 MG tablet Take 1 tablet (100 mg) by mouth at onset of headache for migraine Reported on 5/17/2017 30 tablet 3     BP Readings from Last 3 Encounters:   02/24/20 (!) 83/53   12/17/19 110/64   12/09/19 111/69    Wt Readings from Last 3 Encounters:   02/24/20 65.5 kg (144 lb 4.8 oz)   12/17/19 67.6 kg (149 lb)   12/09/19 66.7 kg (147 lb)                      Reviewed and updated as needed this visit by Provider         Review of Systems   ROS COMP: Constitutional, HEENT, cardiovascular, pulmonary, gi and gu systems are negative, except as otherwise noted.      Objective    BP (!) 83/53   Pulse 105   Temp 100.3  F (37.9  C) (Oral)   Resp 16   Ht 1.651 m (5' 5\")   Wt 65.5 kg (144 lb 4.8 oz)   LMP  (LMP Unknown)   SpO2 93%   BMI 24.01 kg/m    Body mass index is 24.01 kg/m .     90/64  Repeat BP      Physical Exam   GENERAL: alert, no distress and " fatigued  EYES: Eyes grossly normal to inspection, PERRL and conjunctivae and sclerae normal  HENT: ear canals and TM's normal, nose and mouth without ulcers or lesions  NECK: no adenopathy, no asymmetry, masses, or scars and thyroid normal to palpation  RESP: lungs clear to auscultation - no rales, rhonchi or wheezes  CV: regular rate and rhythm, normal S1 S2, no S3 or S4, no murmur, click or rub, no peripheral edema and peripheral pulses strong    Results for orders placed or performed in visit on 02/24/20 (from the past 24 hour(s))   Influenza A/B antigen   Result Value Ref Range    Influenza A/B Agn Specimen Nasal     Influenza A Negative NEG^Negative    Influenza B Negative NEG^Negative           Assessment & Plan       ICD-10-CM    1. Body aches R52 Influenza A/B antigen      influenza like illness - viral    Patient Instructions   Repeat BP      Home cares  Stop propanolol  RTC if sx worsen    Pauline Wang, LUIS ALBERTO  [facility]

## 2020-02-24 NOTE — NURSING NOTE
"Patient here with flu symptoms.  BP (!) 83/53   Pulse 105   Temp 100.3  F (37.9  C) (Oral)   Resp 16   Ht 1.651 m (5' 5\")   Wt 65.5 kg (144 lb 4.8 oz)   LMP  (LMP Unknown)   SpO2 93%   BMI 24.01 kg/m      "

## 2020-02-26 ENCOUNTER — TELEPHONE (OUTPATIENT)
Dept: INTERNAL MEDICINE | Facility: CLINIC | Age: 35
End: 2020-02-26

## 2020-02-26 DIAGNOSIS — R05.9 COUGH: Primary | ICD-10-CM

## 2020-02-26 RX ORDER — AMOXICILLIN 500 MG/1
500 CAPSULE ORAL 2 TIMES DAILY
Qty: 20 CAPSULE | Refills: 0 | Status: SHIPPED | OUTPATIENT
Start: 2020-02-26 | End: 2020-03-07

## 2020-02-26 NOTE — TELEPHONE ENCOUNTER
"Patient was recently seen in clinic for the same symptoms, per office visit:    Description  cough, fever to 101, chills, headache, fatigue/malaise, myalgias and nausea    Call to patient, states that her symptoms are not necessarily worse, she no longer has a fever but feel like her cough might be a little worse, continues to be fatigue. Cough intermittently is wet, sometime productive, sometime is a dry cough. Patient states her headaches are getting worse, she has had one everyday since developing her cough. Patient states that Tamiflu was dicussed at recent office visit but was decided against prescription. Writer is unable to locate this in office visit notes. Patient is currently breastfeeding, looking for recommendations as she describes her symptoms as \"miserable\". Please advise,     Thank you     "

## 2020-02-26 NOTE — TELEPHONE ENCOUNTER
Had negative flu culture, is breastfeeding.  Viral sx will resolve next 1-2 weeks  Can use amoxicillin for  Early bronchitis as possibility.  eRx sent if wishes to   Pauline Wang CNP

## 2020-02-26 NOTE — TELEPHONE ENCOUNTER
Call to patient, patient states to call her  with any instructions. Call to  Vinicius, informed of providers message. Vinicius in agreement and understanding of plan.

## 2020-02-26 NOTE — TELEPHONE ENCOUNTER
"Patient calls because she is experiencing a \"wet cough\" starting 2/24/20 in the evening. She also still has body aches, nausea, sweats and chills but no fever. Please call her back to advise at [phone]    "

## 2020-02-28 ENCOUNTER — TELEPHONE (OUTPATIENT)
Dept: INTERNAL MEDICINE | Facility: CLINIC | Age: 35
End: 2020-02-28

## 2020-02-28 DIAGNOSIS — J98.01 BRONCHOSPASM: Primary | ICD-10-CM

## 2020-02-28 RX ORDER — ALBUTEROL SULFATE 0.83 MG/ML
2.5 SOLUTION RESPIRATORY (INHALATION) EVERY 6 HOURS PRN
Qty: 36 ML | Refills: 0 | Status: SHIPPED | OUTPATIENT
Start: 2020-02-28 | End: 2021-08-11

## 2020-02-28 NOTE — TELEPHONE ENCOUNTER
Last OV on 2/24/20.     Pt asking for Albuterol Nebulizer vials.     She got these in [facility] in November.     Provider approval needed.

## 2020-02-28 NOTE — TELEPHONE ENCOUNTER
E-prescribed Rx.    Will need appt if more refills needed, as we will need to better understand why this is needed--no obvious prior history of asthma documented that I can see.

## 2020-02-28 NOTE — TELEPHONE ENCOUNTER
Patient was seen in  a couple of months ago and got a nebulizer. She is calling to request medication for the nebulizer be sent to the [facility]'s in [place] [address] Call her if you have questions at [phone]

## 2020-03-11 ENCOUNTER — TELEPHONE (OUTPATIENT)
Dept: OBGYN | Facility: CLINIC | Age: 35
End: 2020-03-11

## 2020-03-11 DIAGNOSIS — B37.0 THRUSH: ICD-10-CM

## 2020-03-11 DIAGNOSIS — B37.31 YEAST INFECTION OF THE VAGINA: Primary | ICD-10-CM

## 2020-03-11 RX ORDER — FLUCONAZOLE 150 MG/1
150 TABLET ORAL ONCE
Qty: 1 TABLET | Refills: 0 | Status: SHIPPED | OUTPATIENT
Start: 2020-03-11 | End: 2020-03-11

## 2020-03-11 NOTE — TELEPHONE ENCOUNTER
Pt calling with suspected thrush of her breast (she has already called peds suspecting thrush of her infant and is awaiting their reccomendations)    Also believes she has a yeast infection, having itching/burning and increased vaginal discharge.     She has used Diflucan in the past.    Pharmacy [facility] on [address] in [place] (selected in chart).     Hoping to not have to come in for this, but have prescription sent in instead.  Please advise.    Zeinab King RN

## 2020-03-11 NOTE — TELEPHONE ENCOUNTER
Diflucan sent in, pt advised.  Reviewed OTC monistat to breasts after infant feeding for thrush, wiping off prior to feeding.    Zeinab King RN

## 2020-03-11 NOTE — TELEPHONE ENCOUNTER
Ok to fill diflucan 150mg x1 tab for vaginal yeast infection. Initial treatment for thrush on her breasts is over the counter monistat which she applies after each feed and wipes off prior to the next feed.     Silvana Black MD

## 2020-03-17 RX ORDER — FLUCONAZOLE 200 MG/1
TABLET ORAL
Qty: 11 TABLET | Refills: 0 | Status: SHIPPED | OUTPATIENT
Start: 2020-03-17 | End: 2021-08-11

## 2020-03-17 NOTE — TELEPHONE ENCOUNTER
Pt calls back.  Daughter is better now but pt still has thrush sx on her nipples.    Red and itching, shiny nipples.  She has been doing monistat between each feeding, wiping them down, etc.      Should she be doing any other treatment at this time?  Her daughter has had it twice this month.    Anyi RIVERO R.N.

## 2020-06-24 ENCOUNTER — VIRTUAL VISIT (OUTPATIENT)
Dept: FAMILY MEDICINE | Facility: OTHER | Age: 35
End: 2020-06-24

## 2020-06-24 ENCOUNTER — TELEPHONE (OUTPATIENT)
Dept: INTERNAL MEDICINE | Facility: CLINIC | Age: 35
End: 2020-06-24

## 2020-06-24 NOTE — TELEPHONE ENCOUNTER
Patient would like to be tested to see if she has had or currently has Covid 19. She is asymptomatic but will be visiting a family member who has cancer. She spoke with [facility] who declined her to be tested at this time but she is hoping her PCP would place an order for her. Call her to advise at [phone] (home)

## 2020-06-24 NOTE — PROGRESS NOTES
"Date: 2020 09:07:15  Clinician: Poornima Solis  Clinician NPI: [id number]  Patient: Seema Nielson  Patient : 1985  Patient Address: [facility] [place] [address]  Patient Phone: [phone]  Visit Protocol: URI  Patient Summary:  Seema is a 35 year old ( : 1985 ) female who initiated a Visit for COVID-19 (Coronavirus) evaluation and screening. When asked the question \"Please sign me up to receive news, health information and promotions. \", Seema responded \"No\".    When asked when her symptoms started, Seema reported that she does not have any symptoms.   She denies having recent facial or sinus surgery in the past 60 days and taking antibiotic medication in the past month.    Pertinent COVID-19 (Coronavirus) information  In the past 14 days, Seema has not worked in a congregate living setting.   She either works or volunteers as a healthcare worker or a , or works or volunteers in a healthcare facility. She provides direct patient care. 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 She does not live with a healthcare worker.   Seema has not had a close contact with a laboratory-confirmed COVID-19 patient within the last 14 days.   Pertinent medical history  Seema typically gets a yeast infection when she takes antibiotics. She has used fluconazole (Diflucan) to treat previous yeast infections. 1 dose of fluconazole (Diflucan) has typically been sufficient for symptoms to resolve in the past.   Seema does not need a return to work/school note.   Weight: 145 lbs   Seema does not smoke or use smokeless tobacco.   She denies pregnancy and is breastfeeding. Her last period was over a month ago.   Additional information as reported by the patient (free text): children attend - one has a very " running nose   Weight: 145 lbs    MEDICATIONS: sumatriptan oral, Zoloft oral, buspirone oral, ALLERGIES: NKDA  Clinician Response:  [facility]. If you are concerned about your child you could get them in and get a referral for testing but at this time I would have you monitor for symptoms and follow up for testing if any symptoms develo    Diagnosis: Worries  Diagnosis ICD: R45.82

## 2020-06-24 NOTE — TELEPHONE ENCOUNTER
Pt states she can get her testing done at [facility] and [facility]. She is asymptomatic.   It is $75.00 to get testing. Her insurance will only pay if she has a doctor's order.     There is a form online for the doctor to fill out and fax back.     She works at [employer].   Her Dad is going through cancer treatment and they would like to see him.     Please advise.

## 2020-06-25 ENCOUNTER — MYC MEDICAL ADVICE (OUTPATIENT)
Dept: INTERNAL MEDICINE | Facility: CLINIC | Age: 35
End: 2020-06-25

## 2020-06-25 ENCOUNTER — MEDICAL CORRESPONDENCE (OUTPATIENT)
Dept: HEALTH INFORMATION MANAGEMENT | Facility: CLINIC | Age: 35
End: 2020-06-25

## 2020-06-25 NOTE — TELEPHONE ENCOUNTER
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  I see that she works in [employer] but I need to know what kind of job to order it.      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    Miah Moreno MD

## 2020-06-25 NOTE — TELEPHONE ENCOUNTER
Call to pt. 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     The form is online through [employer] and will need to be faxed to them. Please ask RN if there are questions finding it online.     Once this is done, she would like a My[employer] message done.     Her  Todd, has same request that went to another provider, Riya Vergara CNP.

## 2020-06-25 NOTE — TELEPHONE ENCOUNTER
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    Miah Moreno MD

## 2020-06-26 ENCOUNTER — MYC MEDICAL ADVICE (OUTPATIENT)
Dept: INTERNAL MEDICINE | Facility: CLINIC | Age: 35
End: 2020-06-26

## 2020-06-26 NOTE — TELEPHONE ENCOUNTER
Sent her a [employer] chart message back with the link.     https://[place].gov/[place]id19[place]/if-sick/testing-locations

## 2020-06-26 NOTE — TELEPHONE ENCOUNTER
Patient wants to know if there is another place she can get the Covid testing done because she is not able to get scheduled at [facility] because they are not responding and there is nowhere on line to schedule testing with a doctors order. Call her back to advise at [phone] (home)

## 2020-06-28 ENCOUNTER — NURSE TRIAGE (OUTPATIENT)
Dept: NURSING | Facility: CLINIC | Age: 35
End: 2020-06-28

## 2020-06-28 ENCOUNTER — VIRTUAL VISIT (OUTPATIENT)
Dept: FAMILY MEDICINE | Facility: OTHER | Age: 35
End: 2020-06-28

## 2020-06-28 NOTE — PROGRESS NOTES
"Date: 2020 12:42:58  Clinician: Emma Mathisa  Clinician NPI: [id number]  Patient: Seema Nielson  Patient : 1985  [facility] [place] [address]  Patient Phone: [phone]  Visit Protocol: URI  Patient Summary:  Seema is a 35 year old ( : 1985 ) female who initiated a Visit for COVID-19 (Coronavirus) evaluation and screening. When asked the question \"Please sign me up to receive news, health information and promotions. \", Seema responded \"No\".    Seema states her symptoms started gradually 3-4 days ago.   Her symptoms consist of diarrhea, rhinitis, malaise, a headache, and a sore throat.   Symptom details     Nasal secretions: The color of her mucus is clear.    Sore throat: Seema reports having moderate throat pain (4-6 on a 10 point pain scale), does not have exudate on her tonsils, and can swallow liquids. The lymph nodes in her neck are not enlarged. A rash has not appeared on the skin since the sore throat started.     Headache: She states the headache is moderate (4-6 on a 10 point pain scale).      Seema denies having wheezing, nausea, teeth pain, ageusia, vomiting, ear pain, chills, enlarged lymph nodes, myalgias, anosmia, facial pain or pressure, fever, cough, and nasal congestion. She also denies having recent facial or sinus surgery in the past 60 days, taking antibiotic medication in the past month, and double sickening (worsening symptoms after initial improvement). She is not experiencing dyspnea.   Precipitating events  Within the past week, Seema has not been exposed to someone with strep throat.   Pertinent COVID-19 (Coronavirus) information  In the past 14 days, Seema has not worked in a congregate living setting.   She either works or volunteers as a healthcare worker or a , or works or volunteers in a healthcare facility. She provides direct patient care. 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 She lives with a healthcare worker.   Seema has had a close contact with a laboratory-confirmed COVID-19 patient within 14 days of symptom onset. She was not exposed at her work. Additional information about contact with COVID-19 (Coronavirus) patient as reported by the patient (free text): exposed to sister-in-law (who was at [employer]). We were together outside   Pertinent medical history  Seema typically gets a yeast infection when she takes antibiotics. She has used fluconazole (Diflucan) to treat previous yeast infections. 1 dose of fluconazole (Diflucan) has typically been sufficient for symptoms to resolve in the past.   Seema needs a return to work/school note.   Weight: 145 lbs   Seema does not smoke or use smokeless tobacco.   She denies pregnancy and is breastfeeding. Her last period was over a month ago.   Additional information as reported by the patient (free text): extreme fatigue   Weight: 145 lbs    MEDICATIONS: sumatriptan oral, Zoloft oral, buspirone oral, ALLERGIES: NKDA  Clinician Response:  Dear Seema,   Your symptoms show that you may have coronavirus (COVID-19). This illness can cause fever, cough and trouble breathing. Many people get a mild case and get better on their own. Some people can get very sick.  What should I do?  We would like to test you for this virus.   1. Please call [phone] to schedule your visit. Explain that you were [facility] by [facility] to have a COVID-19 test. Be ready to share your [facility] visit ID number.  The following will serve as your written order for this COVID Test, ordered by me, for the indication of suspected COVID [Z20.828]: The test will be ordered in [facility], our electronic health record, after you are scheduled. It will show as ordered and authorized by Cameron Hawk MD.  Order: COVID-19  "(Coronavirus) PCR for SYMPTOMATIC testing from [facility].      2. When it's time for your COVID test:  Stay at least 6 feet away from others. (If someone will drive you to your test, stay in the backseat, as far away from the  as you can.)   Cover your mouth and nose with a mask, tissue or washcloth.  Go straight to the testing site. Don't make any stops on the way there or back.      3.Starting now: Stay home and away from others (self-isolate) until:   You've had no fever---and no medicine that reduces fever---for 3 full days (72 hours). And...   Your other symptoms have gotten better. For example, your cough or breathing has improved. And...   At least 10 days have passed since your symptoms started.       During this time, don't leave the house except for testing or medical care.   Stay in your own room, even for meals. Use your own bathroom if you can.   Stay away from others in your home. No hugging, kissing or shaking hands. No visitors.  Don't go to work, school or anywhere else.    Clean \"high touch\" surfaces often (doorknobs, counters, handles, etc.). Use a household cleaning spray or wipes. You'll find a full list of  on the EPA website: www.epa.gov/pesticide-registration/list-n-disinfectants-use-against-sars-cov-2.   Cover your mouth and nose with a mask, tissue or washcloth to avoid spreading germs.  Wash your hands and face often. Use soap and water.  Caregivers in these groups are at risk for severe illness due to COVID-19:  o People 65 years and older  o People who live in a nursing home or long-term care facility  o People with chronic disease (lung, heart, cancer, diabetes, kidney, liver, immunologic)  o People who have a weakened immune system, including those who:   Are in cancer treatment  Take medicine that weakens the immune system, such as corticosteroids  Had a bone marrow or organ transplant  Have an immune deficiency  Have poorly controlled HIV or AIDS  Are obese (body mass " index of 40 or higher)  Smoke regularly   o Caregivers should wear gloves while washing dishes, handling laundry and cleaning bedrooms and bathrooms.  o Use caution when washing and drying laundry: Don't shake dirty laundry, and use the warmest water setting that you can.  o For more tips, go to www.cdc.gov/coronavirus/2019-ncov/downloads/10Things.pdf.      How can I take care of myself?   Get lots of rest. Drink extra fluids (unless a doctor has told you not to).   Take Tylenol (acetaminophen) for fever or pain. If you have liver or kidney problems, ask your family doctor if it's okay to take Tylenol.   Adults can take either:    650 mg (two 325 mg pills) every 4 to 6 hours, or...   1,000 mg (two 500 mg pills) every 8 hours as needed.    Note: Don't take more than 3,000 mg in one day. Acetaminophen is found in many medicines (both prescribed and over-the-counter medicines). Read all labels to be sure you don't take too much.   For children, check the Tylenol bottle for the right dose. The dose is based on the child's age or weight.    If you have other health problems (like cancer, heart failure, an organ transplant or severe kidney disease): Call your specialty clinic if you don't feel better in the next 2 days.       Know when to call 911. Emergency warning signs include:    Trouble breathing or shortness of breath Pain or pressure in the chest that doesn't go away Feeling confused like you haven't felt before, or not being able to wake up Bluish-colored lips or face.  Where can I get more information?    [facility] -- About COVID-19: [email or web address]   CDC -- What to Do If You're Sick: www.cdc.gov/coronavirus/2019-ncov/about/steps-when-sick.html   CDC -- Ending Home Isolation: www.cdc.gov/coronavirus/2019-ncov/hcp/disposition-in-home-patients.html   CDC -- Caring for Someone: www.cdc.gov/coronavirus/2019-ncov/if-you-are-sick/care-for-someone.html   [facility] -- Interim Guidance for Hospital  Discharge to Home: www.health.[facility].[place].us/diseases/coronavirus/hcp/hospdischarge.pdf   [facility] clinical trials (COVID-19 research studies): clinicalaffairs.[facility]/n-clinical-trials    Below are the COVID-19 hotlines at the [place] Department of Health ([facility]). Interpreters are available.    For health questions: Call 006-717-8916 or 1-389.698.9336 (7 a.m. to 7 p.m.) For questions about schools and childcare: Call 938-133-6559 or 1-604.762.9183 (7 a.m. to 7 p.m.)    Diagnosis: Other malaise  Diagnosis ICD: R53.81

## 2020-06-28 NOTE — TELEPHONE ENCOUNTER
Pt calling for COVID testing, states she submitted a telehealth visit with her insurance provider today and was advised to have COVID PCR testing, an order was written, pt has this order.     Pt has a runny nose and fatigue, meets criteria for testing through [facility].      RN advised pt to use [facility], education provided.  Pt states her frustration that she has been in contact with the clinic and her insurance provider and can't seem to get a clear answer about how to get this done.  Education provided, RN directed pt to [facility] now or to look at additional resources on the [facility] website.  RN unaware of testing locations that will provide testing without an appointment/order within that healthcare facility.     She verbalized understanding and had no further questions.     Gina Adams RN/ROSSY    Additional Information    Negative: [1] Caller is not with the adult (patient) AND [2] reporting urgent symptoms    Negative: Lab result questions    Negative: Medication questions    Negative: Caller can't be reached by phone    Negative: Caller has already spoken to PCP or another triager    Negative: RN needs further essential information from caller in order to complete triage    Negative: Requesting regular office appointment    Negative: [1] Caller requesting NON-URGENT health information AND [2] PCP's office is the best resource    Health Information question, no triage required and triager able to answer question    Protocols used: INFORMATION ONLY CALL-A-

## 2020-08-30 DIAGNOSIS — F41.9 ANXIETY: ICD-10-CM

## 2020-08-31 RX ORDER — BUSPIRONE HYDROCHLORIDE 15 MG/1
TABLET ORAL
Qty: 360 TABLET | Refills: 3 | Status: SHIPPED | OUTPATIENT
Start: 2020-08-31 | End: 2021-08-11 | Stop reason: DRUGHIGH

## 2020-08-31 NOTE — TELEPHONE ENCOUNTER
Pending Prescriptions:                       Disp   Refills    busPIRone (BUSPAR) 15 MG tablet [Pharmacy *360 ta*3        Sig: TAKE 2 TABLETS(30 MG) BY MOUTH TWICE DAILY    Routing refill request to provider for review/approval because:  Medication is reported/historical

## 2020-09-11 DIAGNOSIS — G43.009 MIGRAINE WITHOUT AURA AND WITHOUT STATUS MIGRAINOSUS, NOT INTRACTABLE: ICD-10-CM

## 2020-09-14 RX ORDER — SUMATRIPTAN 100 MG/1
TABLET, FILM COATED ORAL
Qty: 30 TABLET | Refills: 3 | Status: SHIPPED | OUTPATIENT
Start: 2020-09-14 | End: 2021-08-11

## 2020-09-14 NOTE — TELEPHONE ENCOUNTER
"Requested Prescriptions   Pending Prescriptions Disp Refills     SUMAtriptan (IMITREX) 100 MG tablet [Pharmacy Med Name: SUMATRIPTAN 100MG TABLETS] 30 tablet 3     Sig: TAKE ONE TABLET BY MOUTH AT ONSET OF HEADACHE       Serotonin Agonists Failed - 9/11/2020  7:22 PM        Failed - Serotonin Agonist request needs review.     Please review patient's record. If patient has had 8 or more treatments in the past month, please forward to provider.          Passed - Blood pressure under 140/90 in past 12 months     BP Readings from Last 3 Encounters:   02/24/20 97/60   12/17/19 110/64   12/09/19 111/69                 Passed - Recent (12 mo) or future (30 days) visit within the authorizing provider's specialty     Patient has had an office visit with the authorizing provider or a provider within the authorizing providers department within the previous 12 mos or has a future within next 30 days. See \"Patient Info\" tab in inbasket, or \"Choose Columns\" in Meds & Orders section of the refill encounter.              Passed - Medication is active on med list        Passed - Patient is age 18 or older        Passed - No active pregnancy on record        Passed - No positive pregnancy test in past 12 months             Last office visit 2-24-20    Routing refill request to provider for review/approval because:  Protocol failed.    Please advise, thanks.        "

## 2020-11-29 ENCOUNTER — HEALTH MAINTENANCE LETTER (OUTPATIENT)
Age: 35
End: 2020-11-29

## 2020-12-01 DIAGNOSIS — F41.1 GAD (GENERALIZED ANXIETY DISORDER): ICD-10-CM

## 2020-12-03 RX ORDER — SERTRALINE HYDROCHLORIDE 100 MG/1
TABLET, FILM COATED ORAL
Qty: 135 TABLET | Refills: 3 | Status: SHIPPED | OUTPATIENT
Start: 2020-12-03 | End: 2021-08-11

## 2020-12-03 NOTE — TELEPHONE ENCOUNTER
Routing refill request to provider for review/approval because:  Last prescribed by Deisi Castellanos

## 2021-01-14 NOTE — DISCHARGE SUMMARY
34 year old  y/o  s/p  ppd # 2  hemoglobin is   Hemoglobin   Date Value Ref Range Status   2019 11.3 (L) 11.7 - 15.7 g/dL Final   ]    d/c home   On colace, breast pump and ibuprofen ferrous sulfate oxycodone   Follow-up in 6 weeks for post partum examination    [facility]   Post-Partum Progress Note          Assessment and Plan:    Assessment:   Post-partum day #2  Normal spontaneous vaginal delivery  L&D complications: None      Doing well.  No excessive bleeding      Plan:   Ambulation encouraged  Discharge later today           Interval History:   Doing well.  Pain is well-controlled.  No fevers.  No history of foul-smelling vaginal discharge.  Good appetite.  Denies chest pain, shortness of breath, nausea or vomiting.  Vaginal bleeding is similar to a heavy menstrual flow.  Ambulatory.  Breastfeeding well.          Significant Problems:    None          Review of Systems:    The patient denies any chest pain, shortness of breath, excessive pain, fever, chills, purulent drainage from the wound, nausea or vomiting.          Medications:   All medications related to the patient's surgery have been reviewed          Physical Exam:   All vitals stable  Uterine fundus is firm, non-tender and at the level of the umbilicus          Data:     All laboratory data related to this surgery reviewed  Hemoglobin   Date Value Ref Range Status   2019 11.3 (L) 11.7 - 15.7 g/dL Final   10/29/2018 11.8 11.7 - 15.7 g/dL Final   2018 14.0 11.7 - 15.7 g/dL Final   2017 13.8 11.7 - 15.7 g/dL Final   2017 11.1 (L) 11.7 - 15.7 g/dL Final     -    Shira Chapin, DO      Dr. Shira Chapin, DO    OB/GYN   [facility]     78

## 2021-03-30 ENCOUNTER — TRANSFERRED RECORDS (OUTPATIENT)
Dept: HEALTH INFORMATION MANAGEMENT | Facility: CLINIC | Age: 36
End: 2021-03-30

## 2021-04-01 DIAGNOSIS — Z11.59 ENCOUNTER FOR SCREENING FOR OTHER VIRAL DISEASES: ICD-10-CM

## 2021-04-02 ENCOUNTER — TELEPHONE (OUTPATIENT)
Dept: INTERVENTIONAL RADIOLOGY/VASCULAR | Facility: CLINIC | Age: 36
End: 2021-04-02

## 2021-04-04 ENCOUNTER — HOSPITAL ENCOUNTER (OUTPATIENT)
Dept: LAB | Facility: CLINIC | Age: 36
Discharge: HOME | End: 2021-04-04
Admitting: ORTHOPAEDIC SURGERY
Payer: COMMERCIAL

## 2021-04-04 DIAGNOSIS — Z11.59 ENCOUNTER FOR SCREENING FOR OTHER VIRAL DISEASES: ICD-10-CM

## 2021-04-04 LAB
LABORATORY COMMENT REPORT: NORMAL
SARS-COV-2 RNA RESP QL NAA+PROBE: NEGATIVE
SARS-COV-2 RNA RESP QL NAA+PROBE: NORMAL
SPECIMEN SOURCE: NORMAL
SPECIMEN SOURCE: NORMAL

## 2021-04-04 PROCEDURE — U0005 INFEC AGEN DETEC AMPLI PROBE: HCPCS | Performed by: ORTHOPAEDIC SURGERY

## 2021-04-04 PROCEDURE — U0003 INFECTIOUS AGENT DETECTION BY NUCLEIC ACID (DNA OR RNA); SEVERE ACUTE RESPIRATORY SYNDROME CORONAVIRUS 2 (SARS-COV-2) (CORONAVIRUS DISEASE [COVID-19]), AMPLIFIED PROBE TECHNIQUE, MAKING USE OF HIGH THROUGHPUT TECHNOLOGIES AS DESCRIBED BY CMS-2020-01-R: HCPCS | Performed by: ORTHOPAEDIC SURGERY

## 2021-04-07 ENCOUNTER — HOSPITAL ENCOUNTER (OUTPATIENT)
Dept: GENERAL RADIOLOGY | Facility: CLINIC | Age: 36
Discharge: HOME | End: 2021-04-07
Attending: ORTHOPAEDIC SURGERY | Admitting: ORTHOPAEDIC SURGERY
Payer: COMMERCIAL

## 2021-04-07 VITALS — HEART RATE: 5 BPM | SYSTOLIC BLOOD PRESSURE: 96 MMHG | DIASTOLIC BLOOD PRESSURE: 55 MMHG

## 2021-04-07 DIAGNOSIS — M53.3 SACRO-ILIAC PAIN: ICD-10-CM

## 2021-04-07 DIAGNOSIS — M46.1 SACROILIITIS (H): ICD-10-CM

## 2021-04-07 DIAGNOSIS — M25.559 JOINT PAIN, HIP: ICD-10-CM

## 2021-04-07 PROCEDURE — 20610 DRAIN/INJ JOINT/BURSA W/O US: CPT | Mod: LT

## 2021-04-07 PROCEDURE — 250N000009 HC RX 250

## 2021-04-07 PROCEDURE — 255N000002 HC RX 255 OP 636: Performed by: ORTHOPAEDIC SURGERY

## 2021-04-07 PROCEDURE — 250N000011 HC RX IP 250 OP 636

## 2021-04-07 RX ORDER — TRIAMCINOLONE ACETONIDE 40 MG/ML
INJECTION, SUSPENSION INTRA-ARTICULAR; INTRAMUSCULAR
Status: COMPLETED
Start: 2021-04-07 | End: 2021-04-07

## 2021-04-07 RX ORDER — IOPAMIDOL 408 MG/ML
10 INJECTION, SOLUTION INTRATHECAL ONCE
Status: COMPLETED | OUTPATIENT
Start: 2021-04-07 | End: 2021-04-07

## 2021-04-07 RX ORDER — BUPIVACAINE HYDROCHLORIDE 5 MG/ML
2 INJECTION, SOLUTION EPIDURAL; INTRACAUDAL ONCE
Status: COMPLETED | OUTPATIENT
Start: 2021-04-07 | End: 2021-04-07

## 2021-04-07 RX ORDER — LIDOCAINE HYDROCHLORIDE 10 MG/ML
INJECTION, SOLUTION EPIDURAL; INFILTRATION; INTRACAUDAL; PERINEURAL
Status: COMPLETED
Start: 2021-04-07 | End: 2021-04-07

## 2021-04-07 RX ORDER — IOPAMIDOL 408 MG/ML
10 INJECTION, SOLUTION INTRATHECAL ONCE
Status: DISCONTINUED | OUTPATIENT
Start: 2021-04-07 | End: 2021-04-07 | Stop reason: CLARIF

## 2021-04-07 RX ORDER — TRIAMCINOLONE ACETONIDE 40 MG/ML
40 INJECTION, SUSPENSION INTRA-ARTICULAR; INTRAMUSCULAR ONCE
Status: COMPLETED | OUTPATIENT
Start: 2021-04-07 | End: 2021-04-07

## 2021-04-07 RX ORDER — LIDOCAINE HYDROCHLORIDE 10 MG/ML
5 INJECTION, SOLUTION EPIDURAL; INFILTRATION; INTRACAUDAL; PERINEURAL ONCE
Status: DISCONTINUED | OUTPATIENT
Start: 2021-04-07 | End: 2021-04-07 | Stop reason: CLARIF

## 2021-04-07 RX ORDER — BUPIVACAINE HYDROCHLORIDE 5 MG/ML
INJECTION, SOLUTION EPIDURAL; INTRACAUDAL
Status: COMPLETED
Start: 2021-04-07 | End: 2021-04-07

## 2021-04-07 RX ADMIN — IOPAMIDOL 1 ML: 408 INJECTION, SOLUTION INTRATHECAL at 09:39

## 2021-04-07 RX ADMIN — TRIAMCINOLONE ACETONIDE 40 MG: 40 INJECTION, SUSPENSION INTRA-ARTICULAR; INTRAMUSCULAR at 09:38

## 2021-04-07 RX ADMIN — BUPIVACAINE HYDROCHLORIDE 1 ML: 5 INJECTION, SOLUTION EPIDURAL; INTRACAUDAL at 09:48

## 2021-04-07 RX ADMIN — BUPIVACAINE HYDROCHLORIDE 1 ML: 5 INJECTION, SOLUTION EPIDURAL; INTRACAUDAL; PERINEURAL at 09:48

## 2021-04-07 RX ADMIN — LIDOCAINE HYDROCHLORIDE 5 MG: 10 INJECTION, SOLUTION EPIDURAL; INFILTRATION; INTRACAUDAL; PERINEURAL at 09:36

## 2021-04-07 NOTE — PROGRESS NOTES
Pt was in Radiology today for a left SI joint steroid injection. Pt tolerated ortho procedure well. Pre procedure pain was 4 post procedure pain level 2. Procedure was completed by Neva CORTEZ. There were no complications during this procedure. Pt verbalized understanding of written and verbal instructions and left department in stable and satisfactory condition. There is no evidence of bleeding or any other complications upon discharge.

## 2021-07-01 NOTE — Clinical Note
Hi team, Psychiatry Consult. I am sending care back to PCP team for ongoing care and medication prescribing.  Future medication refills will come from Pauline Wang NP. I recommended that the patient follow up with you as needed in the next 3-6 months or sooner if needed. She will continue with prenatal care with her OB. More details about treatment plan are in my note. If you have any questions or concerns, please let me know. The patient can be referred back to this service for further consultation as needed.Deisi
normal shape
Yes

## 2021-08-10 ASSESSMENT — ENCOUNTER SYMPTOMS
HEARTBURN: 0
CHILLS: 0
BREAST MASS: 0
NAUSEA: 0
EYE PAIN: 0
JOINT SWELLING: 0
HEMATOCHEZIA: 0
DIZZINESS: 0
FREQUENCY: 0
DYSURIA: 0
SHORTNESS OF BREATH: 0
HEMATURIA: 0
SORE THROAT: 0
HEADACHES: 0
COUGH: 0
PARESTHESIAS: 0
FEVER: 0
PALPITATIONS: 0
ARTHRALGIAS: 0
CONSTIPATION: 0
NERVOUS/ANXIOUS: 0
ABDOMINAL PAIN: 0
MYALGIAS: 0
WEAKNESS: 0
DIARRHEA: 0

## 2021-08-11 ENCOUNTER — OFFICE VISIT (OUTPATIENT)
Dept: INTERNAL MEDICINE | Facility: CLINIC | Age: 36
End: 2021-08-11
Payer: COMMERCIAL

## 2021-08-11 VITALS
SYSTOLIC BLOOD PRESSURE: 108 MMHG | HEIGHT: 65 IN | TEMPERATURE: 98.1 F | RESPIRATION RATE: 18 BRPM | BODY MASS INDEX: 25.66 KG/M2 | WEIGHT: 154 LBS | OXYGEN SATURATION: 99 % | HEART RATE: 67 BPM | DIASTOLIC BLOOD PRESSURE: 64 MMHG

## 2021-08-11 DIAGNOSIS — Z00.00 ROUTINE GENERAL MEDICAL EXAMINATION AT A HEALTH CARE FACILITY: Primary | ICD-10-CM

## 2021-08-11 DIAGNOSIS — F33.42 RECURRENT MAJOR DEPRESSION IN COMPLETE REMISSION (H): ICD-10-CM

## 2021-08-11 DIAGNOSIS — M26.609 TMJ (TEMPOROMANDIBULAR JOINT SYNDROME): ICD-10-CM

## 2021-08-11 DIAGNOSIS — F41.9 ANXIETY: ICD-10-CM

## 2021-08-11 DIAGNOSIS — F41.1 GAD (GENERALIZED ANXIETY DISORDER): ICD-10-CM

## 2021-08-11 DIAGNOSIS — G43.009 MIGRAINE WITHOUT AURA AND WITHOUT STATUS MIGRAINOSUS, NOT INTRACTABLE: ICD-10-CM

## 2021-08-11 DIAGNOSIS — Z11.59 NEED FOR HEPATITIS C SCREENING TEST: ICD-10-CM

## 2021-08-11 LAB
BASOPHILS # BLD AUTO: 0 10E3/UL (ref 0–0.2)
BASOPHILS NFR BLD AUTO: 1 %
EOSINOPHIL # BLD AUTO: 0.1 10E3/UL (ref 0–0.7)
EOSINOPHIL NFR BLD AUTO: 2 %
ERYTHROCYTE [DISTWIDTH] IN BLOOD BY AUTOMATED COUNT: 11.7 % (ref 10–15)
HCT VFR BLD AUTO: 41.1 % (ref 35–47)
HGB BLD-MCNC: 13.4 G/DL (ref 11.7–15.7)
IMM GRANULOCYTES # BLD: 0 10E3/UL
IMM GRANULOCYTES NFR BLD: 0 %
LYMPHOCYTES # BLD AUTO: 3.3 10E3/UL (ref 0.8–5.3)
LYMPHOCYTES NFR BLD AUTO: 56 %
MCH RBC QN AUTO: 30.7 PG (ref 26.5–33)
MCHC RBC AUTO-ENTMCNC: 32.6 G/DL (ref 31.5–36.5)
MCV RBC AUTO: 94 FL (ref 78–100)
MONOCYTES # BLD AUTO: 0.5 10E3/UL (ref 0–1.3)
MONOCYTES NFR BLD AUTO: 9 %
NEUTROPHILS # BLD AUTO: 1.9 10E3/UL (ref 1.6–8.3)
NEUTROPHILS NFR BLD AUTO: 32 %
NRBC # BLD AUTO: 0 10E3/UL
NRBC BLD AUTO-RTO: 0 /100
PLATELET # BLD AUTO: 223 10E3/UL (ref 150–450)
RBC # BLD AUTO: 4.37 10E6/UL (ref 3.8–5.2)
WBC # BLD AUTO: 5.8 10E3/UL (ref 4–11)

## 2021-08-11 PROCEDURE — G0145 SCR C/V CYTO,THINLAYER,RESCR: HCPCS | Performed by: NURSE PRACTITIONER

## 2021-08-11 PROCEDURE — 80050 GENERAL HEALTH PANEL: CPT | Performed by: NURSE PRACTITIONER

## 2021-08-11 PROCEDURE — 99395 PREV VISIT EST AGE 18-39: CPT | Performed by: NURSE PRACTITIONER

## 2021-08-11 PROCEDURE — 87624 HPV HI-RISK TYP POOLED RSLT: CPT | Performed by: NURSE PRACTITIONER

## 2021-08-11 PROCEDURE — 86803 HEPATITIS C AB TEST: CPT | Performed by: NURSE PRACTITIONER

## 2021-08-11 PROCEDURE — 36415 COLL VENOUS BLD VENIPUNCTURE: CPT | Performed by: NURSE PRACTITIONER

## 2021-08-11 RX ORDER — BUSPIRONE HYDROCHLORIDE 30 MG/1
30 TABLET ORAL 2 TIMES DAILY
Qty: 180 TABLET | Refills: 3 | Status: SHIPPED | OUTPATIENT
Start: 2021-08-11 | End: 2021-08-13

## 2021-08-11 RX ORDER — ONDANSETRON 4 MG/1
4 TABLET, ORALLY DISINTEGRATING ORAL EVERY 8 HOURS PRN
Qty: 20 TABLET | Refills: 6 | Status: SHIPPED | OUTPATIENT
Start: 2021-08-11 | End: 2021-08-13

## 2021-08-11 RX ORDER — AMITRIPTYLINE HYDROCHLORIDE 10 MG/1
10 TABLET ORAL AT BEDTIME
Qty: 90 TABLET | Refills: 3 | Status: SHIPPED | OUTPATIENT
Start: 2021-08-11 | End: 2021-08-13

## 2021-08-11 RX ORDER — SERTRALINE HYDROCHLORIDE 100 MG/1
TABLET, FILM COATED ORAL
Qty: 90 TABLET | Refills: 3 | Status: SHIPPED | OUTPATIENT
Start: 2021-08-11 | End: 2021-08-13

## 2021-08-11 RX ORDER — AMITRIPTYLINE HYDROCHLORIDE 10 MG/1
10 TABLET ORAL AT BEDTIME
COMMUNITY
End: 2021-08-11

## 2021-08-11 RX ORDER — CYCLOBENZAPRINE HCL 10 MG
10 TABLET ORAL AT BEDTIME
Qty: 90 TABLET | Refills: 3 | Status: SHIPPED | OUTPATIENT
Start: 2021-08-11 | End: 2021-08-13

## 2021-08-11 RX ORDER — SUMATRIPTAN 100 MG/1
TABLET, FILM COATED ORAL
Qty: 12 TABLET | Refills: 6 | Status: SHIPPED | OUTPATIENT
Start: 2021-08-11 | End: 2021-08-13

## 2021-08-11 ASSESSMENT — ENCOUNTER SYMPTOMS
DIZZINESS: 0
EYE PAIN: 0
ARTHRALGIAS: 0
NAUSEA: 0
CONSTIPATION: 0
PARESTHESIAS: 0
SORE THROAT: 0
HEADACHES: 0
SHORTNESS OF BREATH: 0
JOINT SWELLING: 0
DYSURIA: 0
WEAKNESS: 0
HEARTBURN: 0
PALPITATIONS: 0
NERVOUS/ANXIOUS: 0
COUGH: 0
FEVER: 0
DIARRHEA: 0
HEMATURIA: 0
FREQUENCY: 0
BREAST MASS: 0
CHILLS: 0
MYALGIAS: 0
HEMATOCHEZIA: 0
ABDOMINAL PAIN: 0

## 2021-08-11 ASSESSMENT — MIFFLIN-ST. JEOR: SCORE: 1389.42

## 2021-08-11 NOTE — NURSING NOTE
"Chief Complaint   Patient presents with     Physical     pap     initial /64   Pulse 67   Temp 98.1  F (36.7  C) (Oral)   Resp 18   Ht 1.651 m (5' 5\")   Wt 69.9 kg (154 lb)   LMP  (LMP Unknown)   SpO2 99%   Breastfeeding No   BMI 25.63 kg/m   Estimated body mass index is 25.63 kg/m  as calculated from the following:    Height as of this encounter: 1.651 m (5' 5\").    Weight as of this encounter: 69.9 kg (154 lb)..  bp completed using cuff size regular  HANH JOHANSEN LPN  "

## 2021-08-11 NOTE — PROGRESS NOTES
SUBJECTIVE:   CC: Seema Nielson is an 36 year old woman who presents for preventive health visit.       Patient has been advised of split billing requirements and indicates understanding:   Healthy Habits:     Getting at least 3 servings of Calcium per day:  Yes    Bi-annual eye exam:  Yes    Dental care twice a year:  Yes    Sleep apnea or symptoms of sleep apnea:  None    Diet:  Regular (no restrictions)    Frequency of exercise:  1 day/week    Duration of exercise:  Less than 15 minutes    Taking medications regularly:  Yes    Medication side effects:  None    PHQ-2 Total Score: 0    Additional concerns today:  No          Today's PHQ-2 Score:   PHQ-2 ( 1999 Pfizer) 8/10/2021   Q1: Little interest or pleasure in doing things 0   Q2: Feeling down, depressed or hopeless 0   PHQ-2 Score 0   Q1: Little interest or pleasure in doing things Not at all   Q2: Feeling down, depressed or hopeless Not at all   PHQ-2 Score 0       Abuse: Current or Past (Physical, Sexual or Emotional) - No  Do you feel safe in your environment? Yes    Have you ever done Advance Care Planning? (For example, a Health Directive, POLST, or a discussion with a medical provider or your loved ones about your wishes): No, advance care planning information given to patient to review.  Patient declined advance care planning discussion at this time.    Social History     Tobacco Use     Smoking status: Never Smoker     Smokeless tobacco: Never Used   Substance Use Topics     Alcohol use: No     Alcohol/week: 0.0 standard drinks     Comment: One drink/week (prior to pregnancy)         Alcohol Use 8/10/2021   Prescreen: >3 drinks/day or >7 drinks/week? No   Prescreen: >3 drinks/day or >7 drinks/week? -       Reviewed orders with patient.  Reviewed health maintenance and updated orders accordingly - Yes      Breast Cancer Screening:  Any new diagnosis of family breast, ovarian, or bowel cancer? No    FHS-7: No flowsheet data found.      Pertinent  "mammograms are reviewed under the imaging tab.    History of abnormal Pap smear:   PAP / HPV Latest Ref Rng & Units 6/9/2016   PAP (Historical) - NIL   HPV16 NEG Negative   HPV18 NEG Negative   HRHPV NEG Negative     Reviewed and updated as needed this visit by clinical staff  Tobacco  Allergies  Meds  Problems  Med Hx  Surg Hx  Fam Hx  Soc Hx          Reviewed and updated as needed this visit by Provider   Allergies     Surg Hx              Review of Systems   Constitutional: Negative for chills and fever.   HENT: Negative for congestion, ear pain, hearing loss and sore throat.    Eyes: Negative for pain and visual disturbance.   Respiratory: Negative for cough and shortness of breath.    Cardiovascular: Negative for chest pain, palpitations and peripheral edema.   Gastrointestinal: Negative for abdominal pain, constipation, diarrhea, heartburn, hematochezia and nausea.   Breasts:  Negative for tenderness, breast mass and discharge.   Genitourinary: Negative for dysuria, frequency, genital sores, hematuria, pelvic pain, urgency, vaginal bleeding and vaginal discharge.   Musculoskeletal: Negative for arthralgias, joint swelling and myalgias.   Skin: Negative for rash.   Neurological: Negative for dizziness, weakness, headaches and paresthesias.   Psychiatric/Behavioral: Negative for mood changes. The patient is not nervous/anxious.      She is a diabetic educator     Migraine is well controlled with emgality - she gets from neurology     TMJ- flexeril and elavil       OBJECTIVE:   /64   Pulse 67   Temp 98.1  F (36.7  C) (Oral)   Resp 18   Ht 1.651 m (5' 5\")   Wt 69.9 kg (154 lb)   LMP  (LMP Unknown)   SpO2 99%   Breastfeeding No   BMI 25.63 kg/m    Physical Exam  GENERAL:alert and no distress  EYES: Eyes grossly normal to inspection,  and conjunctivae and sclerae normal  HENT: ear canals and TM's normal, nose and mouth without ulcers or lesions  NECK: no adenopathy, no asymmetry, masses, or " scars and thyroid normal to palpation  RESP: lungs clear to auscultation - no rales, rhonchi or wheezes  BREAST: normal without masses, tenderness or nipple discharge and no palpable axillary masses or adenopathy  CV: regular rate and rhythm, normal S1 S2, no S3 or S4, no murmur, click or rub, no peripheral edema and peripheral pulses strong  ABDOMEN: soft, nontender, no hepatosplenomegaly, no masses and bowel sounds normal   (female): normal female external genitalia, normal urethral meatus, vaginal mucosa pink, moist, well rugated, and normal cervix/adnexa/uterus without masses or discharge  MS: no gross musculoskeletal defects noted, no edema  SKIN: no suspicious lesions or rashes  NEURO: Normal strength and tone, mentation intact and speech normal  PSYCH: mentation appears normal, affect normal/bright    Diagnostic Test Results:  Labs reviewed in Epic  Lab   PAP     ASSESSMENT/PLAN:   1. Routine general medical examination at a health care facility    - Pap thin layer screen with HPV - recommended age 30 - 65 years  - Lipid panel reflex to direct LDL Fasting; Future  - Comprehensive metabolic panel (BMP + Alb, Alk Phos, ALT, AST, Total. Bili, TP)  - TSH with free T4 reflex  - CBC with platelets and differential    2. RHETT (generalized anxiety disorder)  Stable on medication   - sertraline (ZOLOFT) 100 MG tablet; TAKE 1 tab qd  Dispense: 90 tablet; Refill: 3  - Comprehensive metabolic panel (BMP + Alb, Alk Phos, ALT, AST, Total. Bili, TP)  - TSH with free T4 reflex  - CBC with platelets and differential    3. Migraine without aura and without status migrainosus, not intractable  Doing well on emgality   - SUMAtriptan (IMITREX) 100 MG tablet; TAKE ONE TABLET BY MOUTH AT ONSET OF HEADACHE  Dispense: 12 tablet; Refill: 6  - ondansetron (ZOFRAN-ODT) 4 MG ODT tab; Take 1 tablet (4 mg) by mouth every 8 hours as needed for nausea  Dispense: 20 tablet; Refill: 6  - Comprehensive metabolic panel (BMP + Alb, Alk Phos,  "ALT, AST, Total. Bili, TP)  - TSH with free T4 reflex  - CBC with platelets and differential    4. Recurrent major depression in complete remission (H)  Stable on medication   - sertraline (ZOLOFT) 100 MG tablet; TAKE 1 tab qd  Dispense: 90 tablet; Refill: 3  - busPIRone HCl (BUSPAR) 30 MG tablet; Take 1 tablet (30 mg) by mouth 2 times daily  Dispense: 180 tablet; Refill: 3  - TSH with free T4 reflex  - CBC with platelets and differential    5. Anxiety  Stable on medication    - sertraline (ZOLOFT) 100 MG tablet; TAKE 1 tab qd  Dispense: 90 tablet; Refill: 3  - busPIRone HCl (BUSPAR) 30 MG tablet; Take 1 tablet (30 mg) by mouth 2 times daily  Dispense: 180 tablet; Refill: 3  - TSH with free T4 reflex  - CBC with platelets and differential    6. TMJ (temporomandibular joint syndrome)  Works well for her pain   - cyclobenzaprine (FLEXERIL) 10 MG tablet; Take 1 tablet (10 mg) by mouth At Bedtime  Dispense: 90 tablet; Refill: 3  - amitriptyline (ELAVIL) 10 MG tablet; Take 1 tablet (10 mg) by mouth At Bedtime  Dispense: 90 tablet; Refill: 3    7. Need for hepatitis C screening test    - Hepatitis C Screen Reflex to HCV RNA Quant and Genotype    Patient has been advised of split billing requirements and indicates understanding:   COUNSELING:  Reviewed preventive health counseling, as reflected in patient instructions       Regular exercise       Healthy diet/nutrition       Osteoporosis prevention/bone health       Consider Hep C screening for all patients one time for ages 18-79 years    Estimated body mass index is 25.63 kg/m  as calculated from the following:    Height as of this encounter: 1.651 m (5' 5\").    Weight as of this encounter: 69.9 kg (154 lb).        She reports that she has never smoked. She has never used smokeless tobacco.      Counseling Resources:  ATP IV Guidelines  Pooled Cohorts Equation Calculator  Breast Cancer Risk Calculator  BRCA-Related Cancer Risk Assessment: FHS-7 Tool  FRAX Risk " Assessment  ICSI Preventive Guidelines  Dietary Guidelines for Americans, 2010  USDA's MyPlate  ASA Prophylaxis  Lung CA Screening    ROSANNE Carlson CNP  [facility]

## 2021-08-12 LAB
ALBUMIN SERPL-MCNC: 4.3 G/DL (ref 3.4–5)
ALP SERPL-CCNC: 58 U/L (ref 40–150)
ALT SERPL W P-5'-P-CCNC: 20 U/L (ref 0–50)
ANION GAP SERPL CALCULATED.3IONS-SCNC: 5 MMOL/L (ref 3–14)
AST SERPL W P-5'-P-CCNC: 16 U/L (ref 0–45)
BILIRUB SERPL-MCNC: 0.4 MG/DL (ref 0.2–1.3)
BUN SERPL-MCNC: 14 MG/DL (ref 7–30)
CALCIUM SERPL-MCNC: 9.6 MG/DL (ref 8.5–10.1)
CHLORIDE BLD-SCNC: 103 MMOL/L (ref 94–109)
CO2 SERPL-SCNC: 26 MMOL/L (ref 20–32)
CREAT SERPL-MCNC: 0.88 MG/DL (ref 0.52–1.04)
GFR SERPL CREATININE-BSD FRML MDRD: 85 ML/MIN/1.73M2
GLUCOSE BLD-MCNC: 77 MG/DL (ref 70–99)
HCV AB SERPL QL IA: NONREACTIVE
POTASSIUM BLD-SCNC: 4.2 MMOL/L (ref 3.4–5.3)
PROT SERPL-MCNC: 7.4 G/DL (ref 6.8–8.8)
SODIUM SERPL-SCNC: 134 MMOL/L (ref 133–144)
TSH SERPL DL<=0.005 MIU/L-ACNC: 1.63 MU/L (ref 0.4–4)

## 2021-08-13 ENCOUNTER — TELEPHONE (OUTPATIENT)
Dept: INTERNAL MEDICINE | Facility: CLINIC | Age: 36
End: 2021-08-13

## 2021-08-13 DIAGNOSIS — F33.42 RECURRENT MAJOR DEPRESSION IN COMPLETE REMISSION (H): ICD-10-CM

## 2021-08-13 DIAGNOSIS — G43.009 MIGRAINE WITHOUT AURA AND WITHOUT STATUS MIGRAINOSUS, NOT INTRACTABLE: ICD-10-CM

## 2021-08-13 DIAGNOSIS — F41.9 ANXIETY: ICD-10-CM

## 2021-08-13 DIAGNOSIS — M26.609 TMJ (TEMPOROMANDIBULAR JOINT SYNDROME): ICD-10-CM

## 2021-08-13 DIAGNOSIS — F41.1 GAD (GENERALIZED ANXIETY DISORDER): ICD-10-CM

## 2021-08-13 RX ORDER — SUMATRIPTAN 100 MG/1
TABLET, FILM COATED ORAL
Qty: 12 TABLET | Refills: 6 | Status: SHIPPED | OUTPATIENT
Start: 2021-08-13 | End: 2022-11-18

## 2021-08-13 RX ORDER — ONDANSETRON 4 MG/1
4 TABLET, ORALLY DISINTEGRATING ORAL EVERY 8 HOURS PRN
Qty: 20 TABLET | Refills: 6 | Status: SHIPPED | OUTPATIENT
Start: 2021-08-13 | End: 2022-09-23

## 2021-08-13 RX ORDER — SERTRALINE HYDROCHLORIDE 100 MG/1
TABLET, FILM COATED ORAL
Qty: 90 TABLET | Refills: 3 | Status: SHIPPED | OUTPATIENT
Start: 2021-08-13 | End: 2022-09-08

## 2021-08-13 RX ORDER — AMITRIPTYLINE HYDROCHLORIDE 10 MG/1
10 TABLET ORAL AT BEDTIME
Qty: 90 TABLET | Refills: 3 | Status: SHIPPED | OUTPATIENT
Start: 2021-08-13 | End: 2022-09-08

## 2021-08-13 RX ORDER — BUSPIRONE HYDROCHLORIDE 30 MG/1
30 TABLET ORAL 2 TIMES DAILY
Qty: 180 TABLET | Refills: 3 | Status: SHIPPED | OUTPATIENT
Start: 2021-08-13 | End: 2022-08-04

## 2021-08-13 RX ORDER — CYCLOBENZAPRINE HCL 10 MG
10 TABLET ORAL AT BEDTIME
Qty: 90 TABLET | Refills: 3 | Status: SHIPPED | OUTPATIENT
Start: 2021-08-13 | End: 2022-08-04

## 2021-08-13 NOTE — TELEPHONE ENCOUNTER
Received fax from [facility] stating the medications patient was prescribed are not specialty and to send to preferred local pharmacy or [facility]. Patient also called clinic and stated that [facility] did not receive the prescriptions that Mya sent on 8/11/21.     Prescriptions resent to [facility].

## 2021-08-17 LAB
BKR LAB AP GYN ADEQUACY: NORMAL
BKR LAB AP GYN INTERPRETATION: NORMAL
BKR LAB AP HPV REFLEX: NORMAL
BKR LAB AP PREVIOUS ABNORMAL: NORMAL
PATH REPORT.COMMENTS IMP SPEC: NORMAL
PATH REPORT.RELEVANT HX SPEC: NORMAL

## 2021-08-18 LAB
HUMAN PAPILLOMA VIRUS 16 DNA: NEGATIVE
HUMAN PAPILLOMA VIRUS 18 DNA: NEGATIVE
HUMAN PAPILLOMA VIRUS FINAL DIAGNOSIS: NORMAL
HUMAN PAPILLOMA VIRUS OTHER HR: NEGATIVE

## 2021-09-14 NOTE — PLAN OF CARE
Data: Seema Nielson transferred to [facility] via wheelchair at 0000. Baby transferred via parent's arms.  Action: Receiving unit notified of transfer: Yes. Patient and family notified of room change. Current nurse continuing cares. Belongings sent to receiving unit. Accompanied by Registered Nurse. Oriented patient to surroundings. Call light within reach. ID bands double-checked with receiving RN.  Response: Patient tolerated transfer and is stable.   Patient Education     Flu Vaccine for Children  Updated for the 5233-9384 flu season   A flu vaccine is the best protection against the flu (influenza) for your child and other family members. The vaccine is given to your child in the form of a shot (injection) or nasal spray. Talk with your child's healthcare provider about which vaccine is best for your child. It’s best to get vaccinated each year, as soon as the vaccine is available in your area. This can be at your healthcare provider's office, health clinic, or pharmacy. If you have questions, talk with your child’s healthcare provider.     Flu facts  · The virus in the flu vaccine has been killed (inactivated) or greatly weakened and won’t give your child the flu.  · The flu is caused by a virus. It can’t be treated with antibiotics.  · The flu can be life threatening. Every year, thousands of people die from complications of the flu.  · Influenza is not the same as stomach flu, the 24-hour bug that causes vomiting and diarrhea. Stomach flu is most likely caused by a gastrointestinal infection, not the flu.  · Flu vaccines are safe for most children. If you have questions or concerns, talk with your child’s healthcare provider.    How a flu vaccine protects your child   There are many types (strains) of flu viruses. Medical experts predict which strains are most likely to make people sick each year. Flu vaccines are made from these strains. With the shot, killed (inactivated) flu viruses are injected into your child’s body. The nasal spray contains a greatly weakened version of the virus. The vaccines prompt the body to make antibodies to fight these flu strains.   Some children may get mild symptoms after a flu vaccine. These may include a runny nose, fever, or pain at the injection site for a day or two. Symptoms can be managed with children’s strength over-the-counter (OTC) medicines. Always talk with your child’s healthcare provider before using OTC  medicines.   · Don’t give OTC cough and cold medicines to a child younger than age 6, unless your child's provider tells you to do so.  · Don’t give your child aspirin.  · Don’t give ibuprofen to an infant age 6 months or younger.  Who should get the flu vaccine?  The CDC and the American Academy of Pediatrics (AAP) recommend that all children 6 months and older get vaccinated, with some exceptions. The vaccine is most often given by a shot into the muscle. The number of doses of flu vaccine depends on the child's age and vaccine history. Two doses are needed for children 6 months to 8 years who are getting their first flu vaccine.   Because of the COVID-19 pandemic, the CDC and AAP strongly advise getting a flu vaccine during 8038-3642 to protect yourself, your family, and others. Flu viruses and the COVID-19 virus are both likely to spread during flu season. A flu vaccine will help save medical resources to care for people with COVID-19. People at high risk for complications from the flu are also likely to be at high risk for serious problems from COVID-19, so it's important to get a flu vaccine.   A nasal spray made of live but weakened flu virus is available for healthy children 2 years and older who don't want the flu shot.   Children younger than 6 months of age are too young to get the flu vaccine.   Some conditions may prevent your child from being vaccinated. Talk with your child's healthcare provider if you are concerned about whether your child should get the flu vaccine. Children may not be able to get a flu shot if they:   · Have had severe allergic reactions to previous flu vaccines  · Have had Guillain-Barré syndrome within 6 weeks of a previous flu vaccine. This is a serious condition that can cause paralysis.  [employer] last reviewed this educational content on 5/1/2020  © 9135-3369 [employer]. All rights reserved. This information is not intended as a substitute for professional  medical care. Always follow your healthcare professional's instructions.

## 2021-09-15 ENCOUNTER — TRANSFERRED RECORDS (OUTPATIENT)
Dept: HEALTH INFORMATION MANAGEMENT | Facility: CLINIC | Age: 36
End: 2021-09-15

## 2021-09-19 ENCOUNTER — HEALTH MAINTENANCE LETTER (OUTPATIENT)
Age: 36
End: 2021-09-19

## 2021-09-29 ENCOUNTER — TRANSFERRED RECORDS (OUTPATIENT)
Dept: HEALTH INFORMATION MANAGEMENT | Facility: CLINIC | Age: 36
End: 2021-09-29

## 2021-12-02 ENCOUNTER — OFFICE VISIT (OUTPATIENT)
Dept: OBGYN | Facility: CLINIC | Age: 36
End: 2021-12-02
Payer: COMMERCIAL

## 2021-12-02 VITALS
WEIGHT: 156 LBS | BODY MASS INDEX: 25.99 KG/M2 | HEIGHT: 65 IN | SYSTOLIC BLOOD PRESSURE: 104 MMHG | DIASTOLIC BLOOD PRESSURE: 68 MMHG

## 2021-12-02 DIAGNOSIS — N90.89 VULVAR LESION: Primary | ICD-10-CM

## 2021-12-02 PROCEDURE — 99212 OFFICE O/P EST SF 10 MIN: CPT | Performed by: OBSTETRICS & GYNECOLOGY

## 2021-12-02 ASSESSMENT — MIFFLIN-ST. JEOR: SCORE: 1398.49

## 2021-12-02 NOTE — NURSING NOTE
"Chief Complaint   Patient presents with     Vaginal Problem     upper labia       Initial /68 (BP Location: Right arm, Patient Position: Chair, Cuff Size: Adult Regular)   Ht 1.651 m (5' 5\")   Wt 70.8 kg (156 lb)   LMP 2021 (Exact Date)   Breastfeeding No   BMI 25.96 kg/m   Estimated body mass index is 25.96 kg/m  as calculated from the following:    Height as of this encounter: 1.651 m (5' 5\").    Weight as of this encounter: 70.8 kg (156 lb).  BP completed using cuff size: regular    Questioned patient about current smoking habits.  Pt. has never smoked.          The following HM Due: NONE  Angeles Hamilton CMA    "

## 2021-12-02 NOTE — PROGRESS NOTES
"Chief Complaint   Patient presents with     Vaginal Problem     upper labia       Subjective:  37yo P2 with a recurrent ?abscess of the left mons pubis.  Becomes tender and drains a few x per month.  Has been recurring x 3 yrs.    Never any signs of systemic illness    REVIEW OF SYSTEMS:  neg    Health Maintenance   Topic Date Due     DEPRESSION ACTION PLAN  Never done     MIGRAINE ACTION PLAN  Never done     PHQ-9  06/04/2020     PREVENTIVE CARE VISIT  08/11/2022     PAP FOLLOW-UP  08/11/2024     HPV FOLLOW-UP  08/11/2024     ADVANCE CARE PLANNING  08/11/2026     DTAP/TDAP/TD IMMUNIZATION (5 - Td or Tdap) 03/14/2029     HEPATITIS C SCREENING  Completed     HIV SCREENING  Completed     INFLUENZA VACCINE  Completed     COVID-19 Vaccine  Completed     Pneumococcal Vaccine: Pediatrics (0 to 5 Years) and At-Risk Patients (6 to 64 Years)  Aged Out     IPV IMMUNIZATION  Aged Out     MENINGITIS IMMUNIZATION  Aged Out     HEPATITIS B IMMUNIZATION  Aged Out       Allergies   Allergen Reactions     Thorazine [Chlorpromazine]      Gets panic attacks per pt     Thimerosal      Skin test reaction        Objective:  Vitals: /68 (BP Location: Right arm, Patient Position: Chair, Cuff Size: Adult Regular)   Ht 1.651 m (5' 5\")   Wt 70.8 kg (156 lb)   LMP 11/02/2021 (Exact Date)   Breastfeeding No   BMI 25.96 kg/m    BMI= Body mass index is 25.96 kg/m .      Right mons superior to clitoral padilla and several cm lateral is a discrete mobile subcutaneous fibrotic ovoid mass c/w chronically inflamed skin appendage.    Discussed removal being best performed in controlled setting in OR ([facility])    Will schedule in near future    Assessment/Plan:  1. Vulvar lesion  Chronically draining subcutaneous abscess.  Not presently infected in appearance    Excision in OR to be scheduled.        Jad Khan MD  "

## 2021-12-03 ENCOUNTER — TELEPHONE (OUTPATIENT)
Dept: OBGYN | Facility: CLINIC | Age: 36
End: 2021-12-03
Payer: COMMERCIAL

## 2021-12-03 DIAGNOSIS — Z11.52 ENCOUNTER FOR PREOPERATIVE SCREENING LABORATORY TESTING FOR COVID-19 VIRUS: Primary | ICD-10-CM

## 2021-12-03 DIAGNOSIS — Z01.812 ENCOUNTER FOR PREOPERATIVE SCREENING LABORATORY TESTING FOR COVID-19 VIRUS: Primary | ICD-10-CM

## 2021-12-03 NOTE — TELEPHONE ENCOUNTER
Procedure name(s) - multi select Remeoval of vulvar lesion    Reason for procedure Chronically inflamed and draining vulvar abscess    Surgeon: Bill    Is this a multi surgeon case? No    Laterality Left    Request for additional equipment Other (see comments) None   Anesthesia Local + MAC    Is the patient known to have any of the following? None of the above    Initiate Pre-op orders for above procedure: Yes, as ordered in Epic Additional orders noted there also   Location of Case: [facility]    PA Assistant: No    Operating room  requested: Yes    Urgency of Surgery: Routine    Surgeon Procedure Time (incision to closure) in minutes (per procedure as applicable) 30    Note: Surgical Case Time Needed (in minutes)   Patient Class (for admit prior to surgery, specify number of days in comments): Same day (surgery center outpatient)    H&P To Be Completed By: PCP     needed? No    Post-Op Appointment 2 weeks    Vendor Needed? No    Spinal Cord Monitoring? No    Patient has Electronic Implant: No

## 2021-12-08 DIAGNOSIS — Z11.52 ENCOUNTER FOR PREOPERATIVE SCREENING LABORATORY TESTING FOR COVID-19 VIRUS: Primary | ICD-10-CM

## 2021-12-08 DIAGNOSIS — Z01.812 ENCOUNTER FOR PREOPERATIVE SCREENING LABORATORY TESTING FOR COVID-19 VIRUS: Primary | ICD-10-CM

## 2021-12-08 NOTE — TELEPHONE ENCOUNTER
Type of surgery: removal of vulvar lesion  Location of surgery: [facility]  Date and time of surgery: 1/27/22 @ 3:00 pm  Surgeon: Dr. Khan  Pre-Op Appt Date: Patient advised to schedule.  Post-Op Appt Date: 2/10/22   Packet sent out: Yes  Pre-cert/Authorization completed:  No  Date: 12/8/21

## 2022-01-11 NOTE — TELEPHONE ENCOUNTER
Patient called to cancel surgery.  She will call back if she would like to reschedule.    Canceled surgery with Nicolasa at [facility].

## 2022-03-04 ENCOUNTER — TELEPHONE (OUTPATIENT)
Dept: OBGYN | Facility: CLINIC | Age: 37
End: 2022-03-04
Payer: COMMERCIAL

## 2022-03-04 DIAGNOSIS — B37.31 YEAST INFECTION OF THE VAGINA: Primary | ICD-10-CM

## 2022-03-04 RX ORDER — FLUCONAZOLE 150 MG/1
150 TABLET ORAL ONCE
Qty: 1 TABLET | Refills: 0 | Status: SHIPPED | OUTPATIENT
Start: 2022-03-04 | End: 2022-03-04

## 2022-03-04 NOTE — TELEPHONE ENCOUNTER
Sx of possible yeast inf.    Has had them before, similar sx.    She has itching, burning, thick discharge.    She requests diflucan pill to be sent. Rx sent.     Anyi RIEVRO RN BSN

## 2022-04-15 ENCOUNTER — OFFICE VISIT (OUTPATIENT)
Dept: OBGYN | Facility: CLINIC | Age: 37
End: 2022-04-15
Payer: COMMERCIAL

## 2022-04-15 VITALS — BODY MASS INDEX: 25.28 KG/M2 | WEIGHT: 151.9 LBS | SYSTOLIC BLOOD PRESSURE: 110 MMHG | DIASTOLIC BLOOD PRESSURE: 70 MMHG

## 2022-04-15 DIAGNOSIS — N81.4 PELVIC RELAXATION DUE TO UTERINE PROLAPSE: Primary | ICD-10-CM

## 2022-04-15 PROCEDURE — 99214 OFFICE O/P EST MOD 30 MIN: CPT | Performed by: OBSTETRICS & GYNECOLOGY

## 2022-04-15 NOTE — PROGRESS NOTES
SUBJECTIVE:                                                       Seema Nielson is a 36 year old female  who presents to clinic today for gyn problem visit.    She has noticed pelvic pressure/vaginal pressure on multiple occasions recently after sitting or standing for long periods of time. It's worse after long periods of strenuous activities or long days on her feet. It is not painful, but is uncomfortable, feeling like pressure or achy. Mirena IUD in place for birth control and rarely gets any bleeding due to this.    No issues with urination generally, though did have an occasional episode of feeling like she was not emptying completely.  She has a history of long-term chronic constipation, and in fact has a sacral nerve stimulator implanted for treatment of this.  This has not changed.  She also feels at times that the vulva may be swollen.        Problem list and histories reviewed & adjusted, as indicated.  Additional history: as documented.    Patient Active Problem List   Diagnosis     TMJ (dislocation of temporomandibular joint)     Constipation     RHETT (generalized anxiety disorder)     Irregular menses     Migraine without aura and without status migrainosus, not intractable     Recurrent major depression in complete remission (H)     Panic attack     Indication for care in labor or delivery     Spontaneous vaginal delivery     Mirena IUD inserted - remove on or before 2024     Urge incontinence of urine     Bowel dysfunction     Rectal urgency     Past Surgical History:   Procedure Laterality Date     APPENDECTOMY OPEN       COLPOSCOPY      normal PAP since this     IMPLANT STIMULATOR SACRAL NERVE STAGE ONE N/A 2019    Procedure: REMOVAL OF PREVIOUSLY PLACED INTERSTIM SYSTEM, INSERTION OF NEW COMPLETE INTERSTIM SYSTEM IMPLANTION WITH INCISION AND IMPLANTATION OF TINED QUADRIPOLAR LEAD ELECTRODES INTO FORAMEN S3 WITH FLUORSOCOPIC GUIDANCE FOR NEEDLE PLACEMENT SUBCUTANEOUS  IMPLANTATION OF SACRAL NEURAL STIMULATOR AND ELECTRONIC ANALYSYS AND COMPLEX PROGRAMMING.;  Surgeon: Royal Ashton MD;  Location: [facility] OR     MANDIBLE SURGERY  2003, 2006    To treat TMJ (Done by oromaxillary sugeon).     NEURAL STIMULATOR DEVICE      Done by urologist in [facility] to treat constipation.     REMOVE STIMULATOR SACRAL NERVE  10/15/2013    Procedure: REMOVE STIMULATOR SACRAL NERVE;   REPLACEMENT NEURO STIMULATOR BATTERY ;  Surgeon: Deacon Casiano MD;  Location: [facility]     wisdom teeth      with jaw surgery      Social History     Tobacco Use     Smoking status: Never Smoker     Smokeless tobacco: Never Used   Substance Use Topics     Alcohol use: No     Alcohol/week: 0.0 standard drinks     Comment: One drink/week (prior to pregnancy)      Problem (# of Occurrences) Relation (Name,Age of Onset)    Breast Cancer (1) Paternal Grandmother (50)    Cerebrovascular Disease (1) Maternal Grandmother (80)    Hyperlipidemia (1) Maternal Grandmother    Hypertension (3) Mother: Born 1953, Paternal Grandmother, Maternal Grandmother    Leukemia (1) Father    Osteoporosis (2) Paternal Grandmother, Paternal Aunt            busPIRone HCl (BUSPAR) 30 MG tablet, Take 1 tablet (30 mg) by mouth 2 times daily  cyclobenzaprine (FLEXERIL) 10 MG tablet, Take 1 tablet (10 mg) by mouth At Bedtime  Galcanezumab-gnlm (EMGALITY SC), Inject Subcutaneous every 30 days  multivitamin w/minerals (THERA-VIT-M) tablet, Take 1 tablet by mouth daily  ondansetron (ZOFRAN-ODT) 4 MG ODT tab, Take 1 tablet (4 mg) by mouth every 8 hours as needed for nausea  sertraline (ZOLOFT) 100 MG tablet, TAKE 1 tab qd  SUMAtriptan (IMITREX) 100 MG tablet, TAKE ONE TABLET BY MOUTH AT ONSET OF HEADACHE  amitriptyline (ELAVIL) 10 MG tablet, Take 1 tablet (10 mg) by mouth At Bedtime    No current facility-administered medications on file prior to visit.    Allergies   Allergen Reactions     Thorazine [Chlorpromazine]      Gets panic attacks per pt      Thimerosal      Skin test reaction        ROS:  As noted above    OBJECTIVE:     Exam:  Constitutional:  Appearance: Well nourished, well developed alert, in no acute distress  Chest:  Respiratory Effort:  Breathing unlabored  Gastrointestinal:  Abdominal Examination:  Abdomen nontender to palpation, tone normal without rigidity or guarding, no masses present, umbilicus without lesions; Liver/Spleen:  No hepatomegaly present, liver nontender to palpation; Hernias:  No hernias present  Skin:General Inspection:  No rashes present, no lesions present, no areas of discoloration  Neurologic/Psychiatric:  Mental Status:  Oriented X3   Pelvic: EGBUS within normal limits.  On speculum examination, there is mild cervico uterine prolapse, grade 1-2.  There is a grade 1 rectocele and a grade 1 cystocele noted.  Mirena IUD strings are easily visualized.  On bimanual exam, the uterus is of normal size and mobility.  Adnexa are negative bilaterally for mass or tenderness.      In-Clinic Test Results:  No results found for this or any previous visit (from the past 24 hour(s)).    ASSESSMENT/PLAN:                                                        ICD-10-CM    1. Pelvic relaxation due to uterine prolapse  N81.4          Options discussed, including surgical correction (vaginal hysterectomy with cystocele and possible rectocele repair) versus pessary fitting versus watchful waiting +/- pelvic floor physical therapy. She is advised that this is not dangerous, and that she can keep an eye on things and wait for any treatment until or if she needs it. She will know she needs to come back in if her symptoms change enough to be bothersome, if she develops any urinary retention or persistent leakage, if bowel movements are difficult and splinting is required to evacuate the rectum, or if she has any vaginal bleeding.    She is interested in referral for pelvic floor physical therapy, which was made today.  I think she mostly  wanted evaluation and reassurance that nothing dangerous was going on, which we discussed.  I am certainly happy to see her back at any point in time if she feels that symptoms are worsening, or if pelvic floor physical therapy is not helpful.    June Jones MD  [facility]

## 2022-04-15 NOTE — NURSING NOTE
"Chief Complaint   Patient presents with     Vaginal Problem     Vaginal pressure    Vaginal deliveries  Pap = UTD          Initial /70 (BP Location: Right arm, Cuff Size: Adult Regular)   Wt 68.9 kg (151 lb 14.4 oz)   Breastfeeding No   BMI 25.28 kg/m   Estimated body mass index is 25.28 kg/m  as calculated from the following:    Height as of 21: 1.651 m (5' 5\").    Weight as of this encounter: 68.9 kg (151 lb 14.4 oz).  BP completed using cuff size: regular    Questioned patient about current smoking habits.  Pt. has never smoked.          The following HM Due: NONE      Emma Berman, MINE on 4/15/2022 at 11:01 AM     "

## 2022-05-08 ENCOUNTER — TRANSFERRED RECORDS (OUTPATIENT)
Dept: HEALTH INFORMATION MANAGEMENT | Facility: CLINIC | Age: 37
End: 2022-05-08
Payer: COMMERCIAL

## 2022-05-16 ENCOUNTER — TRANSFERRED RECORDS (OUTPATIENT)
Dept: HEALTH INFORMATION MANAGEMENT | Facility: CLINIC | Age: 37
End: 2022-05-16
Payer: COMMERCIAL

## 2022-05-23 ENCOUNTER — THERAPY VISIT (OUTPATIENT)
Dept: PHYSICAL THERAPY | Facility: CLINIC | Age: 37
End: 2022-05-23
Attending: OBSTETRICS & GYNECOLOGY
Payer: COMMERCIAL

## 2022-05-23 DIAGNOSIS — N81.4 PELVIC RELAXATION DUE TO UTERINE PROLAPSE: ICD-10-CM

## 2022-05-23 DIAGNOSIS — N39.3 FEMALE STRESS INCONTINENCE: ICD-10-CM

## 2022-05-23 PROCEDURE — 99207 PR NO CHARGE LOS: CPT | Mod: GP | Performed by: PHYSICAL THERAPIST

## 2022-05-23 NOTE — PROGRESS NOTES
Patient arrived for initial physical therapy evaluation, upon discovering length of appointment pt realized she had family commitments and could not stay. [place] appointment length was offered however pt wanting to instead return at later date to benefit from full visit time. Visit rescheduled. No charge applied.

## 2022-06-23 ENCOUNTER — THERAPY VISIT (OUTPATIENT)
Dept: PHYSICAL THERAPY | Facility: CLINIC | Age: 37
End: 2022-06-23
Payer: COMMERCIAL

## 2022-06-23 DIAGNOSIS — N39.3 FEMALE STRESS INCONTINENCE: Primary | ICD-10-CM

## 2022-06-23 DIAGNOSIS — M62.89 PELVIC FLOOR DYSFUNCTION: ICD-10-CM

## 2022-06-23 PROCEDURE — 97140 MANUAL THERAPY 1/> REGIONS: CPT | Mod: GP | Performed by: PHYSICAL THERAPIST

## 2022-06-23 PROCEDURE — 97535 SELF CARE MNGMENT TRAINING: CPT | Mod: GP | Performed by: PHYSICAL THERAPIST

## 2022-06-23 PROCEDURE — 97110 THERAPEUTIC EXERCISES: CPT | Mod: GP | Performed by: PHYSICAL THERAPIST

## 2022-06-23 PROCEDURE — 97161 PT EVAL LOW COMPLEX 20 MIN: CPT | Mod: GP | Performed by: PHYSICAL THERAPIST

## 2022-06-23 NOTE — PROGRESS NOTES
Physical Therapy Initial Evaluation  Subjective:  SUBJECTIVE:    Patient reports onset of symptoms over the past 6 months .   Symptoms include pelvic pressure on days when she is standing more or being more active, feels uncomfortable. Pt also with stress incontinence, feels like she might not be emptying her whole bladder, urinary hesitancy.    Since onset symptoms have been getting better, worse or staying the same? Staying the same       Urination:  Do you leak on the way to the bathroom or with a strong urge to void? No, not really, but rushing to toilet, feels like she won't make it    Do you leak with cough,sneeze, jumping, running? Yes   Do you have triggers that make you feel you can't wait to go to the bathroom? Yes , water nearby, thinking about peeing   Type of pad and number used per day? no  When you leak what is the amount? Small   How often do you typically void? Every 3 hours     How long can you delay the need to urinate? 3 - 10 minutes if it's been awhile   How many times do you get up to urinate at night? 0-1 (usually 1)   Can you stop the flow of urine when on the toilet? No  Is the volume of urine passed usually: sometimes. (8sec rule=  250ml with average bladder storing  400-600ml)    Do you strain to pass urine? No  Do you have a slow or hesitant urinary stream? Sometimes  Do you have difficulty initiating the urine stream? No    How many bladder infections have you had in last 12 months? 0    Fluid intake (one glass is 8oz or one cup) 7 glasses/day  1-2 cup of coffee caffinated glasses/day  0 alcohol glasses/day.    Bowel habits:  Pt has had chronic constipation her whole life, has sacral nerve stimulator stimulator since she was in college, High fiber  Diet, miralax when needed   Frequency of bowel movements? 5 week    Consistancy of stool? soft formed, hard  Do you ignore the urge to defecate? No  Do you strain to pass stool? Yes, sometimes     Pelvic Pain:  When do you have pelvic pain?  After long day of standing feeling more pressure   Are you sexually active? Yes  Is initial penetration during intercourse painful? No  Is deeper penetration painful? No, sometimes   Do you use lubricant? no     Birth History:   Given birth: Yes   Complications: prolonged pushing phase   Vaginal deliveries:  2  C-sections:  0  Episiotomies: 0  Have you ever been worried for your physical safety? No   History of abdominal or pelvic surgeries? Appendix, sacral nerve implant     Regular exercise: elliptical, 2-3x per week    Practiced the PF(kegel) exercises for 4 or more weeks? No             The history is provided by the patient.   Patient Health History  Seema Nielson being seen for stress incontinence.     Problem began: 4/15/2022 (MD visit).   Problem occurred: childbirth    Pain is reported as 0/10 on pain scale.    Pertinent medical history includes: implanted device and migraines/headaches.     Medical allergies: none.    Other surgery history details: TMJ, appendiz, sacral nerve implant .    Current medications:  Anti-depressants, cardiac medication and muscle relaxants.    Current occupation is Part Time diabetes Ed.   Primary job tasks include:  Computer work and prolonged sitting.                                    Objective:  System                                 Pelvic Dysfunction Evaluation:        Flexibility:    Tightness present at:Gluteals      Pelvic Clock Exam:    Ischiocavernosis pain:  -  Bulbocavernosis pain:  -  Transverse Perineal:  -  Levator ANI:  +  Perineal Body:  -      External Assessment:    Skin Condition:  Normal  Scars:  Well healed  Bearing Down/Coughing:  Cystocele  Tissue Symmetry:  Normal  Introitus:  Normal  Muscle Contraction/Perineal Mobility:  Slight lift, no urogential triangle descent  Internal Assessment:    Sensory Exam:  Normal  Contraction/Grade:  Weak squeeze, 2 second hold (2)          Additional History:  Delivery History:  Tearing and vaginal delivery                            General     ROS    Assessment/Plan:    Patient is a 37 year old female with pelvic complaints.    Patient has the following significant findings with corresponding treatment plan.                Diagnosis 1:  Pelvic floor dysfunction  Pain -  self management, education and home program  Decreased ROM/flexibility - manual therapy, therapeutic exercise and home program  Decreased strength - therapeutic exercise, therapeutic activities and home program  Impaired muscle performance - neuro re-education and home program  Decreased function - therapeutic activities and home program      Cumulative Therapy Evaluation is: Low complexity.    Previous and current functional limitations:  (See Goal Flow Sheet for this information)    Short term and Long term goals: (See Goal Flow Sheet for this information)     Communication ability:  Patient appears to be able to clearly communicate and understand verbal and written communication and follow directions correctly.  Treatment Explanation - The following has been discussed with the patient:   RX ordered/plan of care  Anticipated outcomes  Possible risks and side effects  This patient would benefit from PT intervention to resume normal activities.   Rehab potential is good.    Frequency:  1 X week, once daily  Duration:  for 8 weeks  Discharge Plan:  Achieve all LTG.  Independent in home treatment program.  Reach maximal therapeutic benefit.    Please refer to the daily flowsheet for treatment today, total treatment time and time spent performing 1:1 timed codes.

## 2022-07-28 ENCOUNTER — TRANSFERRED RECORDS (OUTPATIENT)
Dept: HEALTH INFORMATION MANAGEMENT | Facility: CLINIC | Age: 37
End: 2022-07-28

## 2022-08-01 DIAGNOSIS — F33.42 RECURRENT MAJOR DEPRESSION IN COMPLETE REMISSION (H): ICD-10-CM

## 2022-08-01 DIAGNOSIS — F41.9 ANXIETY: ICD-10-CM

## 2022-08-01 DIAGNOSIS — M26.609 TMJ (TEMPOROMANDIBULAR JOINT SYNDROME): ICD-10-CM

## 2022-08-02 NOTE — TELEPHONE ENCOUNTER
Pending Prescriptions:                       Disp   Refills    busPIRone HCl (BUSPAR) 30 MG tablet [Pharm*180 ta*3        Sig: TAKE 1 TABLET 2 TIMES DAILY. GENERIC FOR BUSPAR    cyclobenzaprine (FLEXERIL) 10 MG tablet [P*90 tab*3        Sig: TAKE 1 TABLET AT BEDTIME. GENERIC FOR FLEXERIL    Routing refill request to provider for review/approval because:  Drug not on the [facility] refill protocol   PHQ-9 score:    PHQ 12/4/2019   PHQ-9 Total Score 0   Q9: Thoughts of better off dead/self-harm past 2 weeks Not at all

## 2022-08-04 RX ORDER — CYCLOBENZAPRINE HCL 10 MG
TABLET ORAL
Qty: 90 TABLET | Refills: 0 | Status: SHIPPED | OUTPATIENT
Start: 2022-08-04 | End: 2022-09-22

## 2022-08-04 RX ORDER — BUSPIRONE HYDROCHLORIDE 30 MG/1
TABLET ORAL
Qty: 180 TABLET | Refills: 0 | Status: SHIPPED | OUTPATIENT
Start: 2022-08-04 | End: 2022-09-08

## 2022-08-10 ENCOUNTER — HOSPITAL ENCOUNTER (OUTPATIENT)
Dept: GENERAL RADIOLOGY | Facility: CLINIC | Age: 37
Discharge: HOME | End: 2022-08-10
Attending: ORTHOPAEDIC SURGERY | Admitting: ORTHOPAEDIC SURGERY
Payer: COMMERCIAL

## 2022-08-10 VITALS — HEART RATE: 65 BPM | DIASTOLIC BLOOD PRESSURE: 65 MMHG | OXYGEN SATURATION: 98 % | SYSTOLIC BLOOD PRESSURE: 102 MMHG

## 2022-08-10 DIAGNOSIS — M46.1 SACROILIITIS (H): ICD-10-CM

## 2022-08-10 DIAGNOSIS — M53.3 SACRO-ILIAC PAIN: ICD-10-CM

## 2022-08-10 PROCEDURE — 255N000002 HC RX 255 OP 636: Performed by: RADIOLOGY

## 2022-08-10 PROCEDURE — 27096 INJECT SACROILIAC JOINT: CPT | Mod: LT

## 2022-08-10 PROCEDURE — 250N000011 HC RX IP 250 OP 636

## 2022-08-10 PROCEDURE — 250N000009 HC RX 250

## 2022-08-10 RX ORDER — TRIAMCINOLONE ACETONIDE 40 MG/ML
40 INJECTION, SUSPENSION INTRA-ARTICULAR; INTRAMUSCULAR ONCE
Status: DISCONTINUED | OUTPATIENT
Start: 2022-08-10 | End: 2022-10-05 | Stop reason: HOSPADM

## 2022-08-10 RX ORDER — BUPIVACAINE HYDROCHLORIDE 5 MG/ML
INJECTION, SOLUTION EPIDURAL; INTRACAUDAL
Status: COMPLETED
Start: 2022-08-10 | End: 2022-08-10

## 2022-08-10 RX ORDER — LIDOCAINE HYDROCHLORIDE 10 MG/ML
5 INJECTION, SOLUTION EPIDURAL; INFILTRATION; INTRACAUDAL; PERINEURAL ONCE
Status: DISCONTINUED | OUTPATIENT
Start: 2022-08-10 | End: 2022-10-05 | Stop reason: HOSPADM

## 2022-08-10 RX ORDER — BUPIVACAINE HYDROCHLORIDE 5 MG/ML
5 INJECTION, SOLUTION EPIDURAL; INTRACAUDAL ONCE
Status: DISCONTINUED | OUTPATIENT
Start: 2022-08-10 | End: 2022-10-05 | Stop reason: HOSPADM

## 2022-08-10 RX ORDER — LIDOCAINE HYDROCHLORIDE 10 MG/ML
INJECTION, SOLUTION EPIDURAL; INFILTRATION; INTRACAUDAL; PERINEURAL
Status: COMPLETED
Start: 2022-08-10 | End: 2022-08-10

## 2022-08-10 RX ORDER — TRIAMCINOLONE ACETONIDE 40 MG/ML
INJECTION, SUSPENSION INTRA-ARTICULAR; INTRAMUSCULAR
Status: COMPLETED
Start: 2022-08-10 | End: 2022-08-10

## 2022-08-10 RX ORDER — IOPAMIDOL 408 MG/ML
10 INJECTION, SOLUTION INTRATHECAL ONCE
Status: COMPLETED | OUTPATIENT
Start: 2022-08-10 | End: 2022-08-10

## 2022-08-10 RX ADMIN — BUPIVACAINE HYDROCHLORIDE 10 MG: 5 INJECTION, SOLUTION EPIDURAL; INTRACAUDAL; PERINEURAL at 16:03

## 2022-08-10 RX ADMIN — IOPAMIDOL 0.5 ML: 408 INJECTION, SOLUTION INTRATHECAL at 16:01

## 2022-08-10 RX ADMIN — LIDOCAINE HYDROCHLORIDE 4 ML: 10 INJECTION, SOLUTION EPIDURAL; INFILTRATION; INTRACAUDAL at 16:03

## 2022-08-10 RX ADMIN — TRIAMCINOLONE ACETONIDE 40 MG: 40 INJECTION, SUSPENSION INTRA-ARTICULAR; INTRAMUSCULAR at 16:02

## 2022-08-10 NOTE — PROGRESS NOTES
Pt was in Radiology today for a left Sacroiliac joint steroid injection. Pt tolerated ortho procedure well. Pre procedure pain was 5/10 post procedure pain level 1/10. Procedure was completed by Dr Reilly. There were no complications during this procedure. Pt verbalized understanding of written and verbal instructions and left department in stable and satisfactory condition with self. There is no evidence of bleeding or any other complications upon discharge.

## 2022-08-25 ENCOUNTER — THERAPY VISIT (OUTPATIENT)
Dept: PHYSICAL THERAPY | Facility: CLINIC | Age: 37
End: 2022-08-25
Payer: COMMERCIAL

## 2022-08-25 DIAGNOSIS — N39.3 FEMALE STRESS INCONTINENCE: Primary | ICD-10-CM

## 2022-08-25 DIAGNOSIS — M62.89 PELVIC FLOOR DYSFUNCTION: ICD-10-CM

## 2022-08-25 PROCEDURE — 97110 THERAPEUTIC EXERCISES: CPT | Mod: GP | Performed by: PHYSICAL THERAPIST

## 2022-08-25 PROCEDURE — 97140 MANUAL THERAPY 1/> REGIONS: CPT | Mod: GP | Performed by: PHYSICAL THERAPIST

## 2022-09-07 ASSESSMENT — ENCOUNTER SYMPTOMS
DYSURIA: 0
HEMATURIA: 0
JOINT SWELLING: 0
DIZZINESS: 0
FEVER: 0
DIARRHEA: 0
BREAST MASS: 0
HEMATOCHEZIA: 0
MYALGIAS: 0
HEARTBURN: 0
ABDOMINAL PAIN: 0
SHORTNESS OF BREATH: 0
WEAKNESS: 0
CHILLS: 0
COUGH: 0
PALPITATIONS: 0
EYE PAIN: 0
FREQUENCY: 0
SORE THROAT: 0
HEADACHES: 0
CONSTIPATION: 0
ARTHRALGIAS: 1
NAUSEA: 0
PARESTHESIAS: 0
NERVOUS/ANXIOUS: 1

## 2022-09-08 ENCOUNTER — OFFICE VISIT (OUTPATIENT)
Dept: INTERNAL MEDICINE | Facility: CLINIC | Age: 37
End: 2022-09-08
Payer: COMMERCIAL

## 2022-09-08 VITALS
OXYGEN SATURATION: 100 % | SYSTOLIC BLOOD PRESSURE: 114 MMHG | WEIGHT: 149.4 LBS | TEMPERATURE: 97.5 F | HEIGHT: 65 IN | DIASTOLIC BLOOD PRESSURE: 70 MMHG | HEART RATE: 76 BPM | BODY MASS INDEX: 24.89 KG/M2

## 2022-09-08 DIAGNOSIS — G89.29 CHRONIC JAW PAIN: ICD-10-CM

## 2022-09-08 DIAGNOSIS — R53.82 CHRONIC FATIGUE: ICD-10-CM

## 2022-09-08 DIAGNOSIS — F41.1 GAD (GENERALIZED ANXIETY DISORDER): ICD-10-CM

## 2022-09-08 DIAGNOSIS — F33.42 RECURRENT MAJOR DEPRESSION IN COMPLETE REMISSION (H): ICD-10-CM

## 2022-09-08 DIAGNOSIS — M53.3 SACROILIAC JOINT PAIN: ICD-10-CM

## 2022-09-08 DIAGNOSIS — Z13.6 CARDIOVASCULAR SCREENING; LDL GOAL LESS THAN 130: ICD-10-CM

## 2022-09-08 DIAGNOSIS — R68.84 CHRONIC JAW PAIN: ICD-10-CM

## 2022-09-08 DIAGNOSIS — M25.552 HIP PAIN, LEFT: Primary | ICD-10-CM

## 2022-09-08 DIAGNOSIS — G43.009 MIGRAINE WITHOUT AURA AND WITHOUT STATUS MIGRAINOSUS, NOT INTRACTABLE: ICD-10-CM

## 2022-09-08 DIAGNOSIS — F41.9 ANXIETY: ICD-10-CM

## 2022-09-08 LAB
ANION GAP SERPL CALCULATED.3IONS-SCNC: 2 MMOL/L (ref 3–14)
BUN SERPL-MCNC: 11 MG/DL (ref 7–30)
CALCIUM SERPL-MCNC: 9.5 MG/DL (ref 8.5–10.1)
CHLORIDE BLD-SCNC: 107 MMOL/L (ref 94–109)
CHOLEST SERPL-MCNC: 167 MG/DL
CO2 SERPL-SCNC: 30 MMOL/L (ref 20–32)
CREAT SERPL-MCNC: 0.88 MG/DL (ref 0.52–1.04)
ERYTHROCYTE [DISTWIDTH] IN BLOOD BY AUTOMATED COUNT: 12.8 % (ref 10–15)
ERYTHROCYTE [SEDIMENTATION RATE] IN BLOOD BY WESTERGREN METHOD: 14 MM/HR (ref 0–20)
FASTING STATUS PATIENT QL REPORTED: YES
GFR SERPL CREATININE-BSD FRML MDRD: 86 ML/MIN/1.73M2
GLUCOSE BLD-MCNC: 93 MG/DL (ref 70–99)
HCT VFR BLD AUTO: 39.6 % (ref 35–47)
HDLC SERPL-MCNC: 41 MG/DL
HGB BLD-MCNC: 13 G/DL (ref 11.7–15.7)
LDLC SERPL CALC-MCNC: 97 MG/DL
MCH RBC QN AUTO: 30.6 PG (ref 26.5–33)
MCHC RBC AUTO-ENTMCNC: 32.8 G/DL (ref 31.5–36.5)
MCV RBC AUTO: 93 FL (ref 78–100)
NONHDLC SERPL-MCNC: 126 MG/DL
PLATELET # BLD AUTO: 228 10E3/UL (ref 150–450)
POTASSIUM BLD-SCNC: 4 MMOL/L (ref 3.4–5.3)
RBC # BLD AUTO: 4.25 10E6/UL (ref 3.8–5.2)
SODIUM SERPL-SCNC: 139 MMOL/L (ref 133–144)
TRIGL SERPL-MCNC: 145 MG/DL
TSH SERPL DL<=0.005 MIU/L-ACNC: 2.57 MU/L (ref 0.4–4)
WBC # BLD AUTO: 4.5 10E3/UL (ref 4–11)

## 2022-09-08 PROCEDURE — 86431 RHEUMATOID FACTOR QUANT: CPT | Performed by: INTERNAL MEDICINE

## 2022-09-08 PROCEDURE — 80048 BASIC METABOLIC PNL TOTAL CA: CPT | Performed by: INTERNAL MEDICINE

## 2022-09-08 PROCEDURE — 99214 OFFICE O/P EST MOD 30 MIN: CPT | Performed by: INTERNAL MEDICINE

## 2022-09-08 PROCEDURE — 86140 C-REACTIVE PROTEIN: CPT | Performed by: INTERNAL MEDICINE

## 2022-09-08 PROCEDURE — 80061 LIPID PANEL: CPT | Performed by: INTERNAL MEDICINE

## 2022-09-08 PROCEDURE — 84443 ASSAY THYROID STIM HORMONE: CPT | Performed by: INTERNAL MEDICINE

## 2022-09-08 PROCEDURE — 86038 ANTINUCLEAR ANTIBODIES: CPT | Performed by: INTERNAL MEDICINE

## 2022-09-08 PROCEDURE — 85652 RBC SED RATE AUTOMATED: CPT | Performed by: INTERNAL MEDICINE

## 2022-09-08 PROCEDURE — 85027 COMPLETE CBC AUTOMATED: CPT | Performed by: INTERNAL MEDICINE

## 2022-09-08 PROCEDURE — 36415 COLL VENOUS BLD VENIPUNCTURE: CPT | Performed by: INTERNAL MEDICINE

## 2022-09-08 RX ORDER — BUSPIRONE HYDROCHLORIDE 30 MG/1
30 TABLET ORAL 2 TIMES DAILY
Qty: 180 TABLET | Refills: 1 | Status: SHIPPED | OUTPATIENT
Start: 2022-09-08 | End: 2023-04-20

## 2022-09-08 RX ORDER — GABAPENTIN 300 MG/1
CAPSULE ORAL
Qty: 60 CAPSULE | Refills: 0 | Status: SHIPPED | OUTPATIENT
Start: 2022-09-08 | End: 2022-11-18

## 2022-09-08 RX ORDER — SERTRALINE HYDROCHLORIDE 100 MG/1
TABLET, FILM COATED ORAL
Qty: 90 TABLET | Refills: 3 | Status: SHIPPED | OUTPATIENT
Start: 2022-09-08 | End: 2022-09-23

## 2022-09-08 RX ORDER — RIMEGEPANT SULFATE 75 MG/75MG
TABLET, ORALLY DISINTEGRATING ORAL
COMMUNITY
Start: 2022-06-29 | End: 2023-05-01

## 2022-09-08 RX ORDER — LORAZEPAM 0.5 MG/1
0.5 TABLET ORAL DAILY PRN
Qty: 10 TABLET | Refills: 0 | Status: SHIPPED | OUTPATIENT
Start: 2022-09-08 | End: 2023-11-15

## 2022-09-08 RX ORDER — GABAPENTIN 300 MG/1
CAPSULE ORAL
Qty: 60 CAPSULE | Refills: 0 | Status: SHIPPED | OUTPATIENT
Start: 2022-09-08 | End: 2022-09-08

## 2022-09-08 ASSESSMENT — ENCOUNTER SYMPTOMS
CHILLS: 0
FREQUENCY: 0
WEAKNESS: 0
PARESTHESIAS: 0
DIARRHEA: 0
DYSURIA: 0
PALPITATIONS: 0
ARTHRALGIAS: 1
COUGH: 0
HEMATURIA: 0
JOINT SWELLING: 0
EYE PAIN: 0
NAUSEA: 0
HEADACHES: 0
HEARTBURN: 0
SORE THROAT: 0
HEMATOCHEZIA: 0
NERVOUS/ANXIOUS: 1
FEVER: 0
MYALGIAS: 0
SHORTNESS OF BREATH: 0
ABDOMINAL PAIN: 0
CONSTIPATION: 0
DIZZINESS: 0
BREAST MASS: 0

## 2022-09-08 ASSESSMENT — PATIENT HEALTH QUESTIONNAIRE - PHQ9
SUM OF ALL RESPONSES TO PHQ QUESTIONS 1-9: 3
10. IF YOU CHECKED OFF ANY PROBLEMS, HOW DIFFICULT HAVE THESE PROBLEMS MADE IT FOR YOU TO DO YOUR WORK, TAKE CARE OF THINGS AT HOME, OR GET ALONG WITH OTHER PEOPLE: NOT DIFFICULT AT ALL
SUM OF ALL RESPONSES TO PHQ QUESTIONS 1-9: 3

## 2022-09-08 NOTE — PATIENT INSTRUCTIONS
"  *  Gabapentin to help treat the nerve pain.  Take one 300 mg tablet per day in the evening for 3 days, then if no better, then take 1 tablet twice per day.  If still no better after 6-7 days, then take 1 tablet three times per day.   Beware of drowsiness with this medication.     Continue all other medications at the same doses.  Contact your usual pharmacy if you need refills.     *  Lorazepam 1/2 or 1 tablet as needed FOR ACUTE ANXIETY OR PANIC ATTACKS.  Do not drink alcohol after taking this, do not drive after taking this, do not use heavy machinery or perform dangerous tasks after taking due to the possible drowsiness.      *  I would strongly recommend working with a mental health counselor/therapist to help understand your mood better, to learn better coping skills, and to find ways above and beyond medications to to help manage your mood condition.  I can provide a referral if you desire.     *  Google \"CBT for panic\" and check out the book \"Mind over Mood\" for further information on other methods of managing anxiety symptoms beyond medications.     *  Visit the web site National [place] of Mental Health web site (http://www.nimh.nih.gov/index.shtml)  for more information on anxiety, depression, and other conditions      *  Call [phone] ([facility] Internal Medicine Nurse Line) with any questions or concerns about medication side effects.          Resources for any acute mental health issues:    *Crisis Intervention: 335.299.5682 or 572-170-3100 (TTY: 529.924.2217).  Call anytime for help.   *Crisis text line: Text \"START\" to 164-124 (Free - confidential - 24 hours per day/7 days per week)  *[facility] Crisis: COPE: (104.561.9104) 24 hour mobile crisis support for people having a mental health crisis in [facility].    *National Suicide Prevention  9-307-888-7030  *Acute Psychiatric Services (824-473-3365). 24-hour walk-in crisis psychiatric support at [facility]; Emergency " Medications Clinic available 7:30am - 2:00pm  *Crisis Connection: (204.462.8618) 24-hour confidential telephone counseling    *[facility] Emergency Room: [phone]  *Suicide Awareness Voices of Education (SAVE) (www.save.org)  137.705.SAVE (3815)  *Mental Health Consumer/Survivor Network of [place] (www.mhcsn.net): 699.630.8246 or 150-945-4063    *Mental Health Association of [place] (www.mentalhealth.org): 266.802.7698 or 099-688-8790    *Self- Management and Recovery Training., SMART-- Toll free: 160.203.7657  www.Plan A Drink.org    *BRANDI DE OLIVEIRA (National [place] on Mental Illness):  phone: 876.333.3100  toll free: 1.881.BRANDI.HELPS  fax: [phone]  email: [email or web address]  **Call or visit website for information on support groups for individuals or families.

## 2022-09-08 NOTE — PROGRESS NOTES
SUBJECTIVE:   CC: Seema Nielson is an 37 year old woman who initially presents for preventive health visit, but had numerous other issues to discuss.      Patient has been advised of split billing requirements and indicates understanding: Yes     Healthy Habits:     Getting at least 3 servings of Calcium per day:  Yes    Bi-annual eye exam:  Yes    Dental care twice a year:  Yes    Sleep apnea or symptoms of sleep apnea:  None    Diet:  Regular (no restrictions)    Frequency of exercise:  1 day/week    Duration of exercise:  Less than 15 minutes    Taking medications regularly:  Yes    Medication side effects:  None    PHQ-2 Total Score: 0    Additional concerns today:  Yes      1.  Long history of anxiety and depression.  Currently taking medicines from her former primary MD for control of these issues.  As I had distant history of panic attacks never as bad as the first one she had.       2.  Chronic back and sacroiliac pain.  Working with orthopedics and physical therapy.      3.  Chronic migraines on a regular basis.  Takes Imitrex given for symptoms.      4.  Reports nonspecific fatigue of a generalized nature for the many months.  Denies intestinal symptoms (i.e. No diarrhea, no constipation, no irregular BMs), Denies chest pain, shortness of breath, or dyspnea on exertion.  Denies fevers, unintended weight loss, unexplained abdominal pain, unexplained joint or muscle pain,  recent travels, or  history of autoimmune disease.   The patient does not feel that they are aware of any specific cause for this fatigue.      Today's PHQ-2 Score:   PHQ-2 ( 1999 Pf[employer]) 9/7/2022   Q1: Little interest or pleasure in doing things 0   Q2: Feeling down, depressed or hopeless 0   PHQ-2 Score 0   PHQ-2 Total Score (12-17 Years)- Positive if 3 or more points; Administer PHQ-A if positive -   Q1: Little interest or pleasure in doing things Not at all   Q2: Feeling down, depressed or hopeless Not at all   PHQ-2 Score 0        Abuse: Current or Past (Physical, Sexual or Emotional) - No  Do you feel safe in your environment? Yes        Social History     Tobacco Use     Smoking status: Never Smoker     Smokeless tobacco: Never Used   Substance Use Topics     Alcohol use: No     Alcohol/week: 0.0 standard drinks     Comment: One drink/week (prior to pregnancy)         Alcohol Use 9/7/2022   Prescreen: >3 drinks/day or >7 drinks/week? No   Prescreen: >3 drinks/day or >7 drinks/week? -       Reviewed orders with patient.  Reviewed health maintenance and updated orders accordingly - Yes      Breast Cancer Screening:    FHS-7:   Breast CA Risk Assessment (FHS-7) 9/7/2022   Did any of your first-degree relatives have breast or ovarian cancer? No   Did any of your relatives have bilateral breast cancer? No   Did any man in your family have breast cancer? No   Did any woman in your family have breast and ovarian cancer? Yes   Did any woman in your family have breast cancer before age 50 y? No   Do you have 2 or more relatives with breast and/or ovarian cancer? No   Do you have 2 or more relatives with breast and/or bowel cancer? No         Pertinent mammograms are reviewed under the imaging tab.    History of abnormal Pap smear:   PAP / HPV Latest Ref Rng & Units 8/11/2021 6/9/2016   PAP   Negative for Intraepithelial Lesion or Malignancy (NILM) -   PAP (Historical) - - NIL   HPV16 Negative Negative Negative   HPV18 Negative Negative Negative   HRHPV Negative Negative Negative     Reviewed and updated as needed this visit by clinical staff   Tobacco  Allergies  Meds                Reviewed and updated as needed this visit by Provider                     **I reviewed the information recorded in the patient's EPIC chart (including but not limited to medical history, surgical history, family history, problem list, medication list, and allergy list) and updated the information as indicated based on the patients reported information.      "    Review of Systems   Constitutional: Negative for chills and fever.   HENT: Negative for congestion, ear pain, hearing loss and sore throat.    Eyes: Negative for pain and visual disturbance.   Respiratory: Negative for cough and shortness of breath.    Cardiovascular: Negative for chest pain, palpitations and peripheral edema.   Gastrointestinal: Negative for abdominal pain, constipation, diarrhea, heartburn, hematochezia and nausea.   Breasts:  Negative for tenderness, breast mass and discharge.   Genitourinary: Negative for dysuria, frequency, genital sores, hematuria, pelvic pain, urgency, vaginal bleeding and vaginal discharge.   Musculoskeletal: Positive for arthralgias. Negative for joint swelling and myalgias.   Skin: Negative for rash.   Neurological: Negative for dizziness, weakness, headaches and paresthesias.   Psychiatric/Behavioral: Negative for mood changes. The patient is nervous/anxious.      REVIEW OF SYSTEMS:    RESP: negative for cough, dyspnea, wheezing, hemoptysis  CV: negative for chest pain, palpitations, PND, CRAIN, orthopnea; reports no significant changes in their ability to perform physical activity (from cardiovascular standpoint)  GI: negative for dysphagia, N/V, pain, melena, diarrhea and constipation  NEURO: negative for new numbness/tingling, paralysis, incoordination, or focal weakness      OBJECTIVE:   /70   Pulse 76   Temp 97.5  F (36.4  C) (Temporal)   Ht 1.651 m (5' 5\")   Wt 67.8 kg (149 lb 6.4 oz)   SpO2 100%   BMI 24.86 kg/m    Physical Exam  GENERAL alert and no distress  EYES:  Normal sclera,conjunctiva, EOMI  HENT: oral and posterior pharynx without lesions or erythema, facies symmetric  NECK: Neck supple. No LAD, without thyroidmegaly.  RESP: Clear to ausculation bilaterally without wheezes or crackles. Normal BS in all fields.  CV: RRR normal S1S2 without murmurs, rubs or gallops.  LYMPH: no cervical lymph adenopathy appreciated  MS: extremities- no gross " deformities of the visible extremities noted,   EXT:  no lower extremity edema  PSYCH: Alert and oriented times 3; speech- coherent  SKIN:  No obvious significant skin lesions on visible portions of face     Diagnostic Test Results:  Labs reviewed in Epic      Results for orders placed or performed in visit on 09/08/22   CBC with platelets     Status: Normal   Result Value Ref Range    WBC Count 4.5 4.0 - 11.0 10e3/uL    RBC Count 4.25 3.80 - 5.20 10e6/uL    Hemoglobin 13.0 11.7 - 15.7 g/dL    Hematocrit 39.6 35.0 - 47.0 %    MCV 93 78 - 100 fL    MCH 30.6 26.5 - 33.0 pg    MCHC 32.8 31.5 - 36.5 g/dL    RDW 12.8 10.0 - 15.0 %    Platelet Count 228 150 - 450 10e3/uL   TSH with free T4 reflex     Status: Normal   Result Value Ref Range    TSH 2.57 0.40 - 4.00 mU/L   Basic metabolic panel     Status: Abnormal   Result Value Ref Range    Sodium 139 133 - 144 mmol/L    Potassium 4.0 3.4 - 5.3 mmol/L    Chloride 107 94 - 109 mmol/L    Carbon Dioxide (CO2) 30 20 - 32 mmol/L    Anion Gap 2 (L) 3 - 14 mmol/L    Urea Nitrogen 11 7 - 30 mg/dL    Creatinine 0.88 0.52 - 1.04 mg/dL    Calcium 9.5 8.5 - 10.1 mg/dL    Glucose 93 70 - 99 mg/dL    GFR Estimate 86 >60 mL/min/1.73m2   Rheumatoid factor     Status: Normal   Result Value Ref Range    Rheumatoid Factor <6 <12 IU/mL   Anti Nuclear Jeannette IgG by IFA with Reflex     Status: Normal   Result Value Ref Range    BRANDEE interpretation Negative Negative   CRP, inflammation     Status: Normal   Result Value Ref Range    CRP Inflammation <3.00 <5.00 mg/L   ESR: Erythrocyte sedimentation rate     Status: Normal   Result Value Ref Range    Erythrocyte Sedimentation Rate 14 0 - 20 mm/hr   Lipid panel reflex to direct LDL Fasting     Status: Abnormal   Result Value Ref Range    Cholesterol 167 <200 mg/dL    Triglycerides 145 <150 mg/dL    Direct Measure HDL 41 (L) >=50 mg/dL    LDL Cholesterol Calculated 97 <=100 mg/dL    Non HDL Cholesterol 126 <130 mg/dL    Patient Fasting > 8hrs? Yes      Narrative    Cholesterol  Desirable:  <200 mg/dL    Triglycerides  Normal:  Less than 150 mg/dL  Borderline High:  150-199 mg/dL  High:  200-499 mg/dL  Very High:  Greater than or equal to 500 mg/dL    Direct Measure HDL  Female:  Greater than or equal to 50 mg/dL   Male:  Greater than or equal to 40 mg/dL    LDL Cholesterol  Desirable:  <100mg/dL  Above Desirable:  100-129 mg/dL   Borderline High:  130-159 mg/dL   High:  160-189 mg/dL   Very High:  >= 190 mg/dL    Non HDL Cholesterol  Desirable:  130 mg/dL  Above Desirable:  130-159 mg/dL  Borderline High:  160-189 mg/dL  High:  190-219 mg/dL  Very High:  Greater than or equal to 220 mg/dL        ASSESSMENT/PLAN:       (M25.222) Hip pain, left  (primary encounter diagnosis)  Comment:   Plan: REVIEW OF HEALTH MAINTENANCE PROTOCOL ORDERS            (F33.42) Recurrent major depression in complete remission (H)  Comment:   Plan: REVIEW OF HEALTH MAINTENANCE PROTOCOL ORDERS,         sertraline (ZOLOFT) 100 MG tablet, busPIRone         HCl (BUSPAR) 30 MG tablet            (F41.9) Anxiety  Comment: Spoke at length about mood disorders including suspected pathophysiology, role of neurotransmitters, and treatment options including medication options (SSRIs that treat cause of sx and Benzos which treat the sx.) and counselling.   She is prone occasional panic attacks.  Discussed low-dose lorazepam as needed for acute panic or anxiety attacks.  I cautioned this is only an emergency measure and that she should not use it on a regular basis as lorazepam is highly addicting and is sedating much in the same with alcohol may cause impairment  Plan: REVIEW OF HEALTH MAINTENANCE PROTOCOL ORDERS,         sertraline (ZOLOFT) 100 MG tablet, busPIRone         HCl (BUSPAR) 30 MG tablet, gabapentin         (NEURONTIN) 300 MG capsule, DISCONTINUED:         gabapentin (NEURONTIN) 300 MG capsule            (F41.1) RHETT (generalized anxiety disorder)  Comment:   Plan: REVIEW OF HEALTH  MAINTENANCE PROTOCOL ORDERS,         sertraline (ZOLOFT) 100 MG tablet, busPIRone         HCl (BUSPAR) 30 MG tablet, gabapentin         (NEURONTIN) 300 MG capsule, LORazepam (ATIVAN)         0.5 MG tablet, DISCONTINUED: gabapentin         (NEURONTIN) 300 MG capsule            (G43.009) Migraine without aura and without status migrainosus, not intractable  Comment:   Plan: REVIEW OF HEALTH MAINTENANCE PROTOCOL ORDERS            (R68.84,  G89.29) Chronic jaw pain  Comment:   Plan: REVIEW OF HEALTH MAINTENANCE PROTOCOL ORDERS,         ESR: Erythrocyte sedimentation rate, CRP,         inflammation, Anti Nuclear Jeannette IgG by IFA with         Reflex, Rheumatoid factor, CBC with platelets,         TSH with free T4 reflex, Basic metabolic panel,        gabapentin (NEURONTIN) 300 MG capsule,         DISCONTINUED: gabapentin (NEURONTIN) 300 MG         capsule            (R53.82) Chronic fatigue  Comment:   Plan: REVIEW OF HEALTH MAINTENANCE PROTOCOL ORDERS            (M53.3) Sacroiliac joint pain  Comment:   Plan: REVIEW OF HEALTH MAINTENANCE PROTOCOL ORDERS,         ESR: Erythrocyte sedimentation rate, CRP,         inflammation, Anti Nuclear Jeannette IgG by IFA with         Reflex, Rheumatoid factor, gabapentin         (NEURONTIN) 300 MG capsule, DISCONTINUED:         gabapentin (NEURONTIN) 300 MG capsule            (Z13.6) CARDIOVASCULAR SCREENING; LDL GOAL LESS THAN 130  Comment:   Plan: Lipid panel reflex to direct LDL Fasting                *  Gabapentin to help treat the nerve pain.  Take one 300 mg tablet per day in the evening for 3 days, then if no better, then take 1 tablet twice per day.  If still no better after 6-7 days, then take 1 tablet three times per day.   Beware of drowsiness with this medication.       Continue all other medications at the same doses.  Contact your usual pharmacy if you need refills.     *  Lorazepam 1/2 or 1 tablet as needed FOR ACUTE ANXIETY OR PANIC ATTACKS.  Do not drink alcohol after taking  "this, do not drive after taking this, do not use heavy machinery or perform dangerous tasks after taking due to the possible drowsiness.      *  I would strongly recommend working with a mental health counselor/therapist to help understand your mood better, to learn better coping skills, and to find ways above and beyond medications to to help manage your mood condition.  I can provide a referral if you desire.     *  Google \"CBT for panic\" and check out the book \"Mind over Mood\" for further information on other methods of managing anxiety symptoms beyond medications.     *  Visit the web site National [place] of Mental Health web site (http://www.nimh.nih.gov/index.shtml)  for more information on anxiety, depression, and other conditions      *  Call [phone] ([facility] Internal Medicine Nurse Line) with any questions or concerns about medication side effects.         Estimated body mass index is 24.86 kg/m  as calculated from the following:    Height as of this encounter: 1.651 m (5' 5\").    Weight as of this encounter: 67.8 kg (149 lb 6.4 oz).        She reports that she has never smoked. She has never used smokeless tobacco.      Counseling Resources:  ATP IV Guidelines  Pooled Cohorts Equation Calculator  Breast Cancer Risk Calculator  BRCA-Related Cancer Risk Assessment: FHS-7 Tool  FRAX Risk Assessment  ICSI Preventive Guidelines  Dietary Guidelines for Americans, 2010  USDA's MyPlate  ASA Prophylaxis  Lung CA Screening    Chetan Espino MD  [facility]  Answers for HPI/ROS submitted by the patient on 9/8/2022  If you checked off any problems, how difficult have these problems made it for you to do your work, take care of things at home, or get along with other people?: Not difficult at all  PHQ9 TOTAL SCORE: 3      "

## 2022-09-09 LAB
ANA SER QL IF: NEGATIVE
CRP SERPL-MCNC: <3 MG/L
RHEUMATOID FACT SER NEPH-ACNC: <6 IU/ML

## 2022-09-19 ENCOUNTER — MYC MEDICAL ADVICE (OUTPATIENT)
Dept: INTERNAL MEDICINE | Facility: CLINIC | Age: 37
End: 2022-09-19

## 2022-09-19 DIAGNOSIS — M26.609 TMJ (TEMPOROMANDIBULAR JOINT SYNDROME): ICD-10-CM

## 2022-09-19 DIAGNOSIS — F41.1 GAD (GENERALIZED ANXIETY DISORDER): ICD-10-CM

## 2022-09-19 DIAGNOSIS — G43.009 MIGRAINE WITHOUT AURA AND WITHOUT STATUS MIGRAINOSUS, NOT INTRACTABLE: ICD-10-CM

## 2022-09-19 DIAGNOSIS — F41.9 ANXIETY: ICD-10-CM

## 2022-09-19 DIAGNOSIS — F33.42 RECURRENT MAJOR DEPRESSION IN COMPLETE REMISSION (H): ICD-10-CM

## 2022-09-22 NOTE — TELEPHONE ENCOUNTER
Please see attached MC and advise    Pt not taking Gabapentin, has  Self increased Zoloft dosage to 150mg daily and requesting a script sent to pharmacy.    Refill request for Zofran and Flexeril pended for review

## 2022-09-23 RX ORDER — ONDANSETRON 4 MG/1
4 TABLET, ORALLY DISINTEGRATING ORAL EVERY 8 HOURS PRN
Qty: 30 TABLET | Refills: 6 | Status: SHIPPED | OUTPATIENT
Start: 2022-09-23

## 2022-09-23 RX ORDER — CYCLOBENZAPRINE HCL 10 MG
TABLET ORAL
Qty: 90 TABLET | Refills: 1 | Status: SHIPPED | OUTPATIENT
Start: 2022-09-23 | End: 2023-04-20

## 2022-09-23 RX ORDER — SERTRALINE HYDROCHLORIDE 100 MG/1
150 TABLET, FILM COATED ORAL DAILY
Qty: 135 TABLET | Refills: 3 | Status: SHIPPED | OUTPATIENT
Start: 2022-09-23 | End: 2023-10-06

## 2022-10-06 PROBLEM — M62.89 PELVIC FLOOR DYSFUNCTION: Status: RESOLVED | Noted: 2022-06-23 | Resolved: 2022-10-06

## 2022-10-06 PROBLEM — N39.3 FEMALE STRESS INCONTINENCE: Status: RESOLVED | Noted: 2022-05-23 | Resolved: 2022-10-06

## 2022-10-06 NOTE — PROGRESS NOTES
Discharge Note    Progress reporting period is from initial evaluation date (please see noted date below) to Aug 25, 2022.  Linked Episodes   Type: Episode: Status: Noted: Resolved: Last update: Updated by:   PHYSICAL THERAPY Uterine Prolapse and Stress Incontinence 5/23/22 Active 5/23/2022 8/25/2022  4:00 PM Senia Lawrence, PT      Comments:       Seema failed to follow up and current status is unknown.  Please see information below for last relevant information on current status.  Patient seen for 2 visits.    SUBJECTIVE  Subjective changes noted by patient:  pt reports she hasn't been doing anything since last visit because she was overwhelmed after wrapping up PT for her hip and needed a little break prior to next appointments. Pt reports her main goals are to reduce pain wih sex and reduce coccyx pain. Bonus goal would be to sneeze without crossing legs.  .  Current pain level is  .     Previous pain level was   .   Changes in function:  Yes (See Goal flowsheet attached for changes in current functional level)  Adverse reaction to treatment or activity: None    OBJECTIVE  Changes noted in objective findings: review of all exercises as pt hasn't been doing them, progressed internal MFR to B OI     ASSESSMENT/PLAN  Diagnosis: Stress incontinence and prolapse   Updated problem list and treatment plan:   Decreased function - HEP  Decreased strength - HEP  Impaired muscle performance - HEP  STG/LTGs have been met or progress has been made towards goals:  Yes, please see goal flowsheet for most current information  Assessment of Progress: current status is unknown.    Last current status: Pt has not made progress   Self Management Plans:  HEP  I have re-evaluated this patient and find that the nature, scope, duration and intensity of the therapy is appropriate for the medical condition of the patient.  Seema continues to require the following intervention to meet STG and LTG's:   HEP.    Recommendations:  Discharge with current home program.  Patient to follow up with MD as needed.    Please refer to the daily flowsheet for treatment today, total treatment time and time spent performing 1:1 timed codes.

## 2022-11-01 ENCOUNTER — TELEPHONE (OUTPATIENT)
Dept: OBGYN | Facility: CLINIC | Age: 37
End: 2022-11-01

## 2022-11-01 NOTE — TELEPHONE ENCOUNTER
Pt calling.   She is wanting to get an appt with Dr. Ashton and the MD [employer] rep.     pt had an interstim placed 12/2019.    Pt had an MRI done on her head/jaw and was told the correct precautions were not taken.    Aria BARNES RN

## 2022-11-03 NOTE — TELEPHONE ENCOUNTER
Charo can you help arrange an office appointment with myself and the [employer] rep to evaluate her InterStim implant  Thank you

## 2022-11-04 NOTE — TELEPHONE ENCOUNTER
Appointment scheduled and coordinated with the [employer] rep on 11/18/22 @ 9:30.  Patient notified.

## 2022-11-18 ENCOUNTER — OFFICE VISIT (OUTPATIENT)
Dept: OBGYN | Facility: CLINIC | Age: 37
End: 2022-11-18

## 2022-11-18 VITALS — WEIGHT: 152 LBS | SYSTOLIC BLOOD PRESSURE: 104 MMHG | DIASTOLIC BLOOD PRESSURE: 66 MMHG | BODY MASS INDEX: 25.29 KG/M2

## 2022-11-18 DIAGNOSIS — N32.81 URGENCY-FREQUENCY SYNDROME: Primary | ICD-10-CM

## 2022-11-18 PROCEDURE — 99213 OFFICE O/P EST LOW 20 MIN: CPT | Performed by: OBSTETRICS & GYNECOLOGY

## 2022-11-18 NOTE — PROGRESS NOTES
Seema Nielson is a 37 year old female  mid been using Mirena IUD for contraception who on 2019 underwent Removal of previously placed InterStim system and insertion of a new complete InterStim system implantation with incision and implantation of tined quadripolar leads into the right S3 foramen with fluoroscopic guidance for needle placement and subcutaneous implantation of sacral nerve stimulator, electronic analysis and complex programming..  See the operative note from 2019 for full details.  The device is working very well and she presently is asymptomatic.  She recently underwent an MRI scan of her head and jaw and was not sure that proper precautions were taken with the device in place.  The patient would like to have a device check to make sure that it is functioning properly.  I had the [employer] rep come out today and interrogate the unit.  The impedance settings are 1.7 which is very low.  There is adequate battery reserve left in place.  The patient has the appropriate sensations with good control of her urgency frequency syndrome.  After interrogating the unit we found no evidence of any compromise.    Past Medical History:   Diagnosis Date     Abnormal Pap smear of cervix     Treated with Cryo.  Normal paps since then.  Last pap 2016     Anxiety state, unspecified     On Zoloft, well controlled. Managed by Dr. Zhu/Marianne     Battery end of life of spinal cord stimulator      Constipation      Constipation      Depressive disorder     Anxiety     H/O: depression      History of vaccination against human papillomavirus      Migraine, unspecified, without mention of intractable migraine without mention of status migrainosus     Managed by Dr. Zhu/Marianne     OAB (overactive bladder)     interstim initial      Temporomandibular joint disorders, unspecified      TMJ (dislocation of temporomandibular joint) middle school    Has taken Zanaflex.  Has also had surgeries.     Urinary  problem      Current Outpatient Medications   Medication     busPIRone HCl (BUSPAR) 30 MG tablet     cyclobenzaprine (FLEXERIL) 10 MG tablet     Galcanezumab-gnlm (EMGALITY SC)     LORazepam (ATIVAN) 0.5 MG tablet     multivitamin w/minerals (THERA-VIT-M) tablet     NURTEC 75 MG TBDP     ondansetron (ZOFRAN ODT) 4 MG ODT tab     sertraline (ZOLOFT) 100 MG tablet     No current facility-administered medications for this visit.     /66   Wt 68.9 kg (152 lb)   BMI 25.29 kg/m    Constitutional: healthy, alert and no distress    (N32.81) Urgency-frequency syndrome  (primary encounter diagnosis)  Comment: Her InterStim 2 device is working very well providing good relief of symptoms with low impedance settings and shows evidence of proper placement and no evidence of concern.  We discussed that typically battery life is 3 to 5 years but in light of her low power requirements her battery may last significantly longer.  We discussed the signs and symptoms for which she should call  Plan: Otherwise we will have the patient return for routine healthcare maintenance or as needed concerns

## 2022-11-18 NOTE — PATIENT INSTRUCTIONS
You can reach your [facility] Team any time of the day by calling [phone]. This number will put you in touch with the 24 hour nurse line if the clinic is closed.    To contact your OB/GYN Station Coordinator/Surgery Scheduler please call [phone]. This is a direct number for your care team between 8 a.m. and 4 p.m. Monday through Friday.    [facility] is open for your convenience:  Monday through Friday 8 a.m. to 6 p.m.  Closed weekends and all major holidays.

## 2022-11-18 NOTE — NURSING NOTE
"Chief Complaint   Patient presents with     Consult     [employer]       Initial /66   Wt 68.9 kg (152 lb)   BMI 25.29 kg/m   Estimated body mass index is 25.29 kg/m  as calculated from the following:    Height as of 22: 1.651 m (5' 5\").    Weight as of this encounter: 68.9 kg (152 lb).  BP completed using cuff size: regular    Questioned patient about current smoking habits.  Pt. has never smoked.          The following HM Due: NONE      The following patient reported/Care Every where data was sent to:  P ABSTRACT QUALITY INITIATIVES [[id number]]        Liliya Walker CMA                 "

## 2022-11-21 ENCOUNTER — HEALTH MAINTENANCE LETTER (OUTPATIENT)
Age: 37
End: 2022-11-21

## 2023-03-29 ENCOUNTER — TELEPHONE (OUTPATIENT)
Dept: FAMILY MEDICINE | Facility: CLINIC | Age: 38
End: 2023-03-29
Payer: COMMERCIAL

## 2023-03-29 NOTE — TELEPHONE ENCOUNTER
Patient calling and states she does not have a primary and has upcoming appt with Starr and hoping to have her a ongoing provider as has heard good things about her.  Feels she needs med for ADD.  Was on in past.  She is wondering if anything she should do prior to visit.  Asked if she has had testing and was diagnosed.  Was tested in college.  Does have those results.  Will bring to visit.  Feels she was on Concerta in the past.  Discussed til seen by provider can not do anything til established patient.  Patient will discuss at her upcoming visit 5/3/23.  Natalie Singer RN

## 2023-04-04 NOTE — ADDENDUM NOTE
Addendum  created 05/17/19 [id number] by Deacon Spain MD    Sign clinical note       [facility]    Medicine Progress Note - Hospitalist Service    Date of Admission:  1/21/2023  Date of Service: 04/04/2023    Assessment & Plan      Blanco Osborne is a 58 year-old male admitted on 1/21/2023 as a transfer from [facility] for evaluation of loculated pleural effusion. He has extensive PMH including h/o liver transplant 2016 due to alcohol abuse, pAF on chronic anticoagulation, history of diabetes mellitus type 2, CVA, hypertension, CHRISTIAN on BiPAP.  In 11/2022 he had respiratory arrest and was diagnosed with parainfluenza and strep pneumoniae and acute on chronic renal insufficiency, which ended with ESRD, needing dialysis initiation and also found to have cirrhosis of transplanted liver. He was recovering in [facility] and was transferred to [facility] for consideration of chest tube placement and possible intrapleural lysis for worsening effusion.     Acute metabolic encephalopathy with delirium, multifactorial, slowly improving?  Hepatic encephalopathy  Chronic liver disease, history of liver transplant 2016  End-stage renal disease (ESRD), on hemodialysis (HD)  History of seizure, on Keppra and Vimpat  Waxing and waning mentation, coinciding with lactulose being held at [facility]  Earlier in hospital stay, remained confused and had pulled out tracheostomy tube, PICC line and pulled his dialysis catheter.  Lines replaced.  Psychiatry consulted, recent follow-up 2/21, 2/28, see notes.    Mental status continues to fluctuate with periods of alertness and increased sleepiness, overall improving.    Complicated, prolonged hospital course with mental status concerns multifactorial related to hepatic encephalopathy, end-stage renal disease on dialysis, past PEA arrest, seizure disorder, medication, and chronic liver disease with prior liver transplant.  As mental status fluctuates will continue to require one-to-one sitter as needed for added safety and supervision.  Reassess  daily.    Continue to reorient and redirect as able.  Avoid restraints if possible with goals of safety and close supervision (given multiple lines and tubes - dialysis catheter, PEG tube, IV, etc).    Maintain normal day/night cycles and sleep-wake cycles.  Minimize sedating medications as able.  Remains weak and deconditioned with complex, prolonged hospital course and limited mobility.  Encouragement and support offered daily to patient.    Continue Lactulose to 10 mg BID, monitor for symptoms; goal to have 2-3 bowel movements per day.    Ongoing hemodialysis, as per nephrology, 3X/week dialysis schedule.    Nephrology consult follow-up appreciated, dialysis patient.    Continue ramelteon 8 mg at bedtime; monitor for side effects including AM sedation, reassess daily.    Started on olanzapine 5 mg BID and PRN earlier during this hospitalization. Delirium improved, discontinued AM dose of olanzapine on non-dialysis days.    Seizure disorder stable, continue Keppra and Vimpat; monitor, no recurrent seizures reported of recent.    Reconsult psychiatry as needed.  Neurology consult appreciated.  Patient has been started on ropinirole for restless leg syndrome.     Bilateral pleural effusions   Acute respiratory failure requiring tracheostomy  - resolved    Pneumonia  - resolved   ARDS  - resolved    Right pneumothorax - resolved   *Initial event was parainfluenza and strep pneumo pneumonia back in November 2022. Treated for ARDS. Had failed extubation x2 and tracheostomy performed on previous hospitalization. *Had additional complications of bilateral pneumothoraces as well as hydropneumothorax- pleural fluid grew candida parapsilosis  *Completed 4-week antifungal treatment. While in LTACH he was successfully weaned from the ventilator.  *Recently there were concern for worsening effusion while at LTACH and had thoracentesis 01/13 which returned exudative without growth on cultures. CT chest/abdomen/pelvis on 01/18  demonstrated worsening effusions and concerns for infected parapneumonic effusions with possible SBP.  Multiple pulmonologist at [facility] reviewed CT scan recommended transfer to [facility] for consideration of chest tube placement and possible intrapleural lysis  *Thoracic surgery (Dr. Jackson) consulted during admission   *CT chest 1/22, small effusions on imaging and so chest tube was not inserted.   ID consulted, see note 2/10.  *Patient pulled out his tracheostomy tube on the night 2/1-2/2 and is tolerating well without increased shortness of breath persistent cough or worsening hypoxia.  * Chest x-ray 2/3 with cardiomegaly, chronic small bilateral pleural effusions, no pulmonary edema; right internal jugular dialysis catheter in place.   3/12 Pulmonary consult, see note, concerns of hypercapnia, atelectasis, with consideration of BiPAP trial; no recommendations for antibiotics with infiltrates felt to be atelectasis.    Stable, continue to monitor respiratory status, recently stable on room air; occasional cough reported.    Encourage incentive spirometry as able, up to chair, deep breathing.    Wound care previously consulted for tracheostomy site care, monitor for signs and symptoms of infection, healing well - resolved.    Encourage ambulation.    Possible splenic infract  Wedge shaped area on CT scan chest and CT abdomen on 3/7/23.    Hematology consulted, suspicion for infarct was low.    TTE no thrombus or vegetations.    No abdominal pain CTM.     S/p liver transplant 2016   Chronic immunosuppression, on tacrolimus  Cirrhosis with ascites  Subacute bacterial peritonitis (SBP), resolved  *Main manifestations of this are hepatic encephalopathy and ascites.  Hepatologist at [facility] felt that most likely reason for the cirrhosis was metabolic syndrome or alcohol intake.  There was no evidence of rejection on biopsy and there was some evidence of regeneration. Paracentesis done on 12/22/2022 showed  "lactobacillus indicating SBP, treated with Unasyn and Augmentin, finished 2-week course 1/12/2023.  Requires large-volume paracentesis periodically for recurring ascites.    *Paracentesis 1/13/2023 yielded about 7500 cc. Cultures NGTD. paracentesis on 1/24 with 4.8 L fluid removal  Paracentesis on 3/6/23 No SBP    Continue intermittent paracentesis as needed if develops symptomatic ascites.    Monitor for signs and symptoms of recurrent spontaneous bacterial peritonitis.    Further treatment for recurrent ascites with TIPS deferred to his Liver Transplant team and/or Hepatology, he has an appointment on 4/21/2023 with Hepatology, Dr. Simms.    Continue Tacrolimus 1 mg BID, rifaximin 550 mg BID.    Continue lactulose as ordered, titrate as needed to have 2-3 bms.    GI consult as needed in hospital for new acute issues, otherwise as outpatient.    Encourgae high protein diet.    CT abdomen on March 9, 2023 showed small to moderate ascites.    Goals of care   As per prior rounding provider, \"on 1/25 nursing had mentioned that patient had refused to undergo dialysis and want to stop care, palliative care was consulted.  Patient and his spouse denied wanting to stop care and wanted ongoing restorative care including full code\"    Monitor, Full Code status.    Needs placement to TCU.     Diarrhea, improved, on lactulose for chronic liver disease    C difficile negative on 1/31. Secondary to lactulose. Discontinued rectal tube (2/12) as stools more formed.    Continue lactulose with goal of 2 to 3 soft bowel movement in 24 hours.     Paroxysmal atrial fibrillation  Chronic anticoagulation, apixaban  History of embolic strokes  Remains in sinus rhythm, on anticoagulation with apixaban indefinitely for stroke prophylaxis.      Monitor rhythm.    Continue apixaban anticoagulation, it was briefly held for fistula placement.    Monitor hemoglobin, see below.     Acute anemia  Patient has required intermittent transfusions for " on-going anemia.  Anemia most likely secondary to critical illness/chronic disease, chronic renal disease.  Baseline hemoglobin around 7-8.  Likely Epo resistance.    Hemoglobin stable at 8.4 on 3/30/23.    Monitor intermittently.    Hypercalcemia  Josue hypercalcemia of Immobility    Nephrology following.    Hold VIT D.    Continue to hold Phoslo.    Monitor labs.    Encourage ambulation.    Nephrology considering prolia, however patient's wife would like to hold off for now.    History PEA arrest   PEA arrest prior to his previous admission and 1 episode of PEA associated with desaturation on 12/20.  No structural cardiac disease.     Stable, continue cardiac Monitoring.     Chronic Hypotension  In the past has developed baseline hypotension with cirrhosis and prolonged critical illness.  On hemodialysis.  Receiving midodrine and albumin during dialysis.    Continue midodrine, as per nephrology.      History of seizure   After hypoxic event, in the context of baseline multifactorial encephalopathy secondary to his ongoing critical illness and prior cardiac arrests and prior strokes and cirrhosis with hepatic encephalopathy. MRI of head of 12/20/22 reveals no PRES or leptomeningeal enhancement but scattered.  HHV6+ in CSF is regarded by neurology as unlikely to be a pathogen and Gancyclovir was stopped.   No recent seizure activity.    Continue levetiracetam, lacosamide.    Seizure precautions.    Outpatient follow-up with neurology, consult inpatient if needed.     ESRD  Anemia due to renal disease  Hypotension during dialysis  Hyperphosphatemia    As per nephrology.    Nephrology reviewing vein mapping to assess options for internal access placement for dialysis, see note 3/16.    Underwent Fistula placement on  3/21/22.    Vascular surgery following-continue to use for tunnel catheter.  Outpatient follow-up with vascular surgery and for will need a follow-up ultrasound of the fistula in 6 weeks to assess  patency.    Started sevalmer 800mg TID with meals on 3/27/23.    Complained of cramping with dialysis. Tried a dose of oxycodone prior to dialysis but this was not helpful. He will discuss the cramping with nephrology.     Diabetes mellitus type 2  *Hemoglobin A1c on November 2022 was 4.7  *By report, on Lantus insulin in the past.  Blood sugar checks and sliding scale insulin previously discontinued as has been stable without insulin needs.    Monitor with periodic blood sugar checks as needed.    Hypomagnesemia    Resolved with replacement.     Severe malnutrition, protein and calorie type  Moderate dysphagia  G-tube feedings    Continue with tube feeds via PEG tube.    IR replaced the PEG tube on 2/8/2023, monitor.    Nutritionist consulted and following for tube feeds.    SLP following as well. Oral diet as per speech and language therapy (SLP). Patient hopeful to advance to thin liquids soon.    2/20 Nutrition following for tube feeds.    Now undergoing PRN tube feeds.    Encourage high-protein diet.    Nutrition notes reviewed, patient eating 0 to 100% of meals.  Continue current interventions.  As needed tube feed boluses available if patient consumes less than 50% of the meal.    Family encouraging patient to eat high protein diet including protein bars.     Anxiety  Fluoxetine was discontinued as per psychiatry recommendation on 2/2 (see consult note for details).    Stable, monitor; comfort and reassurance offered during visit.    Psychiatry follow-up.    Restless leg  Syndrome    Patient started on ropiranole by neurology.       Diet: Room Service  Snacks/Supplements Adult: Other; Gelatein+ with brkfst and lunch, and magic cup with dinner (RD); With Meals  Combination Diet Regular Diet; Mildly Thick (level 2) (OK for fresh fruit (e.g., pineapple) per SLP)  Adult Formula Bolus Feeding: Novasource Renal; Route: Gastrostomy; 3 times daily; Volume per Bolus: 240; mL(s); Back up bolus for when pt consumes <50%  of meals: 80 mL/hr x 3 hrs    DVT Prophylaxis: DOAC  Nixon Catheter: Not present  Lines: PRESENT      CVC Double Lumen Right External jugular Tunneled-Site Assessment: WDL  Hemodialysis Vascular Access Arteriovenous fistula Left Forearm-Site Assessment: WDL;Bruit present;Thrill present      Cardiac Monitoring: None  Code Status: Full Code      Clinically Significant Risk Factors           # Hypercalcemia: Highest Ca = 10.2 mg/dL in last 2 days, will monitor as appropriate    # Hypoalbuminemia: Lowest albumin = 1.9 g/dL at 1/23/2023  6:38 AM, will monitor as appropriate   # Thrombocytopenia: Lowest platelets = 118 in last 2 days, will monitor for bleeding          # Severe Malnutrition: based on nutrition assessment        Disposition Plan      Expected Discharge Date: 04/12/2023, 12:00 PM  Discharge Delays: Placement - LTC  Destination: inpatient rehabilitation facility  Discharge Comments: Dialysis pt MWF. Will need placement TCU/LTC. [facility] willing to accept once no sitter, SW to follow up when sitter free          Franky Monet MD  Hospitalist Service  [facility]  Securely message with [employer] (more info)  Text page via [employer] Paging/Directory   ______________________________________________________________________    Interval History      Blanco Osborne was seen this morning.  Still needing sitter  Intermittently impulsive  No CP/SOB  More calm this afternoon, no fevers  No new complaints    Physical Exam   Vital Signs: Temp: 97.5  F (36.4  C) Temp src: Oral BP: 90/53 Pulse: 71   Resp: 20 SpO2: 97 % O2 Device: None (Room air)    Weight: 180 lbs 15.96 oz    Constitutional: no apparent distress  Respiratory: no increased work of breathing, clear to auscultation bilaterally, no crackles or wheezing  Cardiovascular: regular rate and rhythm, normal S1 and S2, no murmur noted  GI: normal bowel sounds, soft, non-distended, non-tender  Skin: warm, dry  Musculoskeletal: no lower extremity  pitting edema present  Neurologic: awake, alert, appropriate in conversation, moves all extremities    Medical Decision Making       30 MINUTES SPENT BY ME on the date of service doing chart review, history, exam, documentation & further activities per the note.      Data     I have personally reviewed the following data over the past 24 hrs:    N/A  \   N/A   / N/A     139 99 50.1 (H) /  133 (H)   4.0 26 5.10 (H) \       ALT: N/A AST: N/A AP: N/A TBILI: N/A   ALB: 3.6 TOT PROTEIN: N/A LIPASE: N/A

## 2023-04-12 ENCOUNTER — MYC MEDICAL ADVICE (OUTPATIENT)
Dept: INTERNAL MEDICINE | Facility: CLINIC | Age: 38
End: 2023-04-12
Payer: COMMERCIAL

## 2023-04-12 DIAGNOSIS — F41.1 GAD (GENERALIZED ANXIETY DISORDER): Primary | ICD-10-CM

## 2023-04-12 DIAGNOSIS — G43.009 MIGRAINE WITHOUT AURA AND WITHOUT STATUS MIGRAINOSUS, NOT INTRACTABLE: ICD-10-CM

## 2023-04-12 DIAGNOSIS — F33.42 RECURRENT MAJOR DEPRESSION IN COMPLETE REMISSION (H): ICD-10-CM

## 2023-04-19 DIAGNOSIS — F41.9 ANXIETY: ICD-10-CM

## 2023-04-19 DIAGNOSIS — M26.609 TMJ (TEMPOROMANDIBULAR JOINT SYNDROME): ICD-10-CM

## 2023-04-19 DIAGNOSIS — F41.1 GAD (GENERALIZED ANXIETY DISORDER): ICD-10-CM

## 2023-04-19 DIAGNOSIS — F33.42 RECURRENT MAJOR DEPRESSION IN COMPLETE REMISSION (H): ICD-10-CM

## 2023-04-20 RX ORDER — CYCLOBENZAPRINE HCL 10 MG
TABLET ORAL
Qty: 90 TABLET | Refills: 1 | Status: SHIPPED | OUTPATIENT
Start: 2023-04-20

## 2023-04-20 RX ORDER — BUSPIRONE HYDROCHLORIDE 30 MG/1
TABLET ORAL
Qty: 180 TABLET | Refills: 0 | Status: SHIPPED | OUTPATIENT
Start: 2023-04-20 | End: 2023-10-06

## 2023-04-30 NOTE — PROGRESS NOTES
Medication Therapy Management (MTM) Encounter    ASSESSMENT:                            Medication Adherence/Access: No issues identified    Depression/Anxiety: PHQ-9 and RHETT-7 previously at goal of < 5 score. Would benefit from updated assessment - sent via [email or web address]Me. Suggested patient monitor time of day when anxiety worsens or fatigue occurs - Buspar may contribute to fatigue and due to it's short half-life (2-4 hrs) the effects on anxiety may wean off throughout day (takes twice daily - AM/PM). Future considerations include reducing dose of Buspar (on max daily dose) & modify administration to three times daily - recommended discussing this further with provider at upcoming appt if patient would be interested in modifying therapy. See notes below for future considerations for SNRI/gabapentinoid in setting of pain.     Chronic Pain/TMJ: Discussed risks vs benefits of chronic NSAID use - TIAN-2 selective NSAIDs (celecoxib). Patient would benefit from trial of celecoxib as needed vs scheduled twice daily. Alternatively, may consider addition of topical NSAID (diclofenac) as needed for temporal mandibular pain. Would benefit from continuing to follow with PT & OMJ specialist. Future considerations include: (i) switch SSRI (serltraine) to SNRI (I.e. duloxetine, venlafaxine); or (ii) addition of gabapentin or pregabalin for added neuropathic & musculoskeletal pain benefits.     Migraine: Improved. Given concerns with fatigue, and benefits from preventive migraine medication (Emgality): may consider tapering off propranolol therapy in future (fatigue 5-7% per UpToDate). Recommend patient considers discussing this further at upcoming appointment with provider and/or at follow- up with Neurology (Dr. Liu).     Allergies: Stable.     PLAN:                            (1) Complete updated PHQ-9 and RHETT-7 score, sent in [employer] message.     (2) Consider Celebrex twice daily as needed vs scheduled. May also consider lower  dose 100 mg capsule. Confirm this with Dr. Karl Mendoza DDS, [facility]    (3) Scheduled for appointment with provider Starr Gutierres PA-C on 5/3/23     Future Considerations to Discuss with Primary Provider, Neurologist or Jaw Specialist:   - reduce propranolol dose and/or taper off - monitor for improvement in fatigue or worsening migraines  - Buspar 20 mg three times daily vs 30 mg three times daily to improve anxiety   - Addition of pregabalin or gabapentin for neuropathic/musculoskeletal pain (can also help with anxiety)   - Switch in therapy from SSRI (sertraline) to SRNI (duloxetine or venlafaxine) for anti-depressant/anxiety benefits plus neuropathic/musculoskeletal pain     Follow-up: Return in about 1 year (around 5/1/2024) for Follow up, Medication Therapy Management or as needed for medication related questions or concerns.    SUBJECTIVE/OBJECTIVE:                          Seema Nielson is a 37 year old female called for an initial visit. She was self-referred to me, orders placed by Dr. Espino.      Reason for visit: Medication review - would like to ensure she is taking the right medications, at the right time & that they are safe for her to take. Has felt some more general fatigue, wonders if any of her medications could play a role in this.     Allergies/ADRs: Reviewed in chart  Past Medical History: Reviewed in chart  Tobacco: She reports that she has never smoked. She has never used smokeless tobacco.  Alcohol: Reviewed in chart    Medication Adherence/Access: no issues reported, consistently takes medications.   The patient fills medications at [facility] [employer] at [place], [place]     Depression/Anxiety: Current medications include: Sertraline 150 mg once daily and Buspirone 30mg twice daily.   Has lorazepam 0.5 mg as needed on hand for acute panic attacks- limits use to ~2 x per year.   She is unsure if Buspar worsens fatigues when administered at different times. No concerns with other side  effects.     Patient reports that her mood & depression symptoms are doing really well. Does note some difficulties with anxiety vs depression symptoms, not able to pinpoint time of day when anxiety is worse/flares, she will monitor this. Sometimes feels that she cannot shut off her mind. No major concerns with sleep - in general is able to sleep through the night.   Patient reports sertraline dose was increased from 100 mg daily (per records 10/2022) - this has helped anxiety. Feels this may have 'flattened' her mood more.      Medication History:   She has been on both of these medications for at least 10 years, they have helped control her depression/anxiety. Reports trying to get off Buspar in the past (~6 years ago) - found this to be difficult on her mood & resumed use.   See below - never tried Gabapentin; notes she increased Zoloft dose instead.        6/27/2019    10:17 AM 12/4/2019     9:08 AM 9/8/2022     9:11 AM   PHQ-9 SCORE   PHQ-9 Total Score MyChart  0 3 (Minimal depression)   PHQ-9 Total Score 0 0 3         6/9/2016     1:19 PM 10/4/2017     3:07 PM 2/23/2019     8:03 AM   RHETT-7 SCORE   Total Score   2 (minimal anxiety)   Total Score 2 0 2     Chronic pain/TMJ: Currently medications include:  Cyclobenzaprine 10 mg nightly as needed for TMJ - is effective, limits use (on average 1 x per week)  Celebrex 200 mg twice daily scheduled - started around 1-2 months ago by 'jaw doctor'.   Also receives trigger point injections.     She wonders if the celebrex is safe for her to continue to take scheduled, seems like a high dose. Thinks this is helpful for pain, however has never skipped/missed a dose & is not sure if this would worsen pain - would be interested in trying as needed vs scheduled or reduced dose.   Potential side effects noted include: fatigue with cyclobenzaprine (limited use)      Previous Medications: Notes she has tried a number of medications in past for pain - thinks she has tried  Naproxen, not sure if it was helpful.   - Gabapentin 300 mg: prescribed for neuropathic pain in Sept 2022 (Dr. Espino - titrate up to 300 mg TID) ; she did not try this medication. Reports she was evaluated by Rheumatologist recently & that they suggested she could consider trying this if needed for pain   - Per records has also tried: Amitriptyline, Medrol DosePak, Tizanidine    PMH: chronic TMJ area pain for many years; status post bilateral TMJ arthroplasty twice; History of degenerative changes/joint dysfunction in the sacroiliac joint area.     Specialist: Follows with craniofacial/Jaw specialist, Dr. Ruma Gastelum at [facility]. Patient reports her provider ran out of ideas to help with pain & she was referred to see a surgical consultant.     Specialist: Saw Dr. Karl Mendoza DDS, [facility] (Oral & Maxillofacial Surgery); started on Celebrex at this time    Specialist: Frederick Hall PT at [facility] for TMJ. Last visit 4/28/23. The following was discussed:   - Option for repeat Botox injections, scheduled for 5/18/23    Specialist: Dr. Isaac Garnica, [facility].  Last visit on 4/13/23. The follow was noted: low suspicion for any inflammatory arthritis or connective tissue disease contributing to her symptoms. Discuss adjuvant therapy like gabapentin, Lyrica, Cymbalta, Savella with PCP. Continue Celebrex up to twice daily as needed for joint pain    Creatinine   Date Value Ref Range Status   09/08/2022 0.88 0.52 - 1.04 mg/dL Final   09/13/2019 0.95 0.52 - 1.04 mg/dL Final     GFR Estimate   Date Value Ref Range Status   09/08/2022 86 >60 mL/min/1.73m2 Final     Comment:     Effective December 21, 2021 eGFRcr in adults is calculated using the 2021 CKD-EPI creatinine equation which includes age and gender (Major et al., NEJ, DOI: 10.1056/REEIrk8283152)     Chronic Migraine without Aura:   Current preventive medications include: Emgalitiy 120 mg SC every 28 days,  "Propranolol 20 mg AM & 40 mg PM   Current abortive medications include: Nurtec ODT 75 mg as needed, uses ~ 2x per month.   Zofran ODT as needed for nausea with migraines.     Feels migraines have been well controlled since starting Emgality.   She wonders if propranolol could be contributing to fatigue. Notes trying to go off propranolol in the past, needed to resume due to flare in migraines. Does not think she was on Emgality at this time or had recently initiated this.   Patient's triggers include: Weather changes, reports this time of year tends to be worse for migraine flares     Specialist: Dr. Liu and Deisy Welch, Neurologist.     Previous Medications:   - Topamax: caused foggy brain   - Metoprolol   - Sumatriptan     Allergies: Current medications include: Flonase 1-2 sprays daily. Completed [facility] visit 4/27/23. Has been helpful for viral sinusitis. No concerns with side effects.    BP Readings from Last 1 Encounters:   11/18/22 104/66     Pulse Readings from Last 1 Encounters:   09/08/22 76     Wt Readings from Last 1 Encounters:   11/18/22 152 lb (68.9 kg)     Ht Readings from Last 1 Encounters:   09/08/22 5' 5\" (1.651 m)     Estimated body mass index is 25.29 kg/m  as calculated from the following:    Height as of 9/8/22: 5' 5\" (1.651 m).    Weight as of 11/18/22: 152 lb (68.9 kg).    Temp Readings from Last 1 Encounters:   09/08/22 97.5  F (36.4  C) (Temporal)     ----------------    I spent 35 minutes with this patient today. I offer these suggestions for consideration by Starr Gutierres PA-C. A copy of the visit note was provided to the patient's provider(s).    A summary of these recommendations was sent via Getui.    Ms. Nielson was seen independently by Dr. Sexton. I have reviewed and agree with the resident note and plan of care.  Senia Miranda, PharmD      Claudia Sexton, PharmD  MTM Pharmacist Resident  Pager: [phone]    Telemedicine Visit Details  Type of service:  " Telephone visit  Start Time: 11:00am  End Time: 11:35am     Medication Therapy Recommendations  TMJ (temporomandibular joint syndrome)    Current Medication: celecoxib (CELEBREX) 200 MG capsule   Rationale: Dose too high - Dosage too high - Safety   Recommendation: Decrease Frequency - Consider celecoxib 100-200 mg twice daily as needed vs scheduled. Confirm this with Dr. Mendoza.   Status: Patient Agreed - Adherence/Education

## 2023-05-01 ENCOUNTER — VIRTUAL VISIT (OUTPATIENT)
Dept: PHARMACY | Facility: CLINIC | Age: 38
End: 2023-05-01
Attending: INTERNAL MEDICINE
Payer: COMMERCIAL

## 2023-05-01 DIAGNOSIS — G43.009 MIGRAINE WITHOUT AURA AND WITHOUT STATUS MIGRAINOSUS, NOT INTRACTABLE: ICD-10-CM

## 2023-05-01 DIAGNOSIS — F33.42 RECURRENT MAJOR DEPRESSION IN COMPLETE REMISSION (H): Primary | ICD-10-CM

## 2023-05-01 DIAGNOSIS — M26.609 TMJ (TEMPOROMANDIBULAR JOINT SYNDROME): ICD-10-CM

## 2023-05-01 DIAGNOSIS — J30.2 SEASONAL ALLERGIC RHINITIS, UNSPECIFIED TRIGGER: ICD-10-CM

## 2023-05-01 DIAGNOSIS — G89.29 OTHER CHRONIC PAIN: ICD-10-CM

## 2023-05-01 DIAGNOSIS — F41.1 GAD (GENERALIZED ANXIETY DISORDER): ICD-10-CM

## 2023-05-01 PROCEDURE — 99607 MTMS BY PHARM ADDL 15 MIN: CPT

## 2023-05-01 PROCEDURE — 99605 MTMS BY PHARM NP 15 MIN: CPT

## 2023-05-01 RX ORDER — PROPRANOLOL HYDROCHLORIDE 40 MG/1
TABLET ORAL
COMMUNITY
Start: 2023-04-16

## 2023-05-01 RX ORDER — GALCANEZUMAB 120 MG/ML
120 INJECTION, SOLUTION SUBCUTANEOUS
COMMUNITY
Start: 2023-03-26

## 2023-05-01 RX ORDER — CELECOXIB 200 MG/1
200 CAPSULE ORAL 2 TIMES DAILY
COMMUNITY
End: 2023-05-01

## 2023-05-01 RX ORDER — FLUTICASONE PROPIONATE 50 MCG
1 SPRAY, SUSPENSION (ML) NASAL DAILY PRN
COMMUNITY
Start: 2023-04-27 | End: 2023-05-03

## 2023-05-01 RX ORDER — RIMEGEPANT SULFATE 75 MG/75MG
75 TABLET, ORALLY DISINTEGRATING ORAL PRN
COMMUNITY
Start: 2023-03-31

## 2023-05-01 RX ORDER — CELECOXIB 200 MG/1
200 CAPSULE ORAL 2 TIMES DAILY
COMMUNITY
Start: 2023-03-28

## 2023-05-01 SDOH — ECONOMIC STABILITY: INCOME INSECURITY: HOW HARD IS IT FOR YOU TO PAY FOR THE VERY BASICS LIKE FOOD, HOUSING, MEDICAL CARE, AND HEATING?: NOT HARD AT ALL

## 2023-05-01 SDOH — ECONOMIC STABILITY: TRANSPORTATION INSECURITY
IN THE PAST 12 MONTHS, HAS LACK OF TRANSPORTATION KEPT YOU FROM MEETINGS, WORK, OR FROM GETTING THINGS NEEDED FOR DAILY LIVING?: NO

## 2023-05-01 SDOH — ECONOMIC STABILITY: FOOD INSECURITY: WITHIN THE PAST 12 MONTHS, YOU WORRIED THAT YOUR FOOD WOULD RUN OUT BEFORE YOU GOT MONEY TO BUY MORE.: NEVER TRUE

## 2023-05-01 SDOH — HEALTH STABILITY: PHYSICAL HEALTH: ON AVERAGE, HOW MANY DAYS PER WEEK DO YOU ENGAGE IN MODERATE TO STRENUOUS EXERCISE (LIKE A BRISK WALK)?: 1 DAY

## 2023-05-01 SDOH — HEALTH STABILITY: PHYSICAL HEALTH: ON AVERAGE, HOW MANY MINUTES DO YOU ENGAGE IN EXERCISE AT THIS LEVEL?: 20 MIN

## 2023-05-01 SDOH — ECONOMIC STABILITY: TRANSPORTATION INSECURITY
IN THE PAST 12 MONTHS, HAS THE LACK OF TRANSPORTATION KEPT YOU FROM MEDICAL APPOINTMENTS OR FROM GETTING MEDICATIONS?: NO

## 2023-05-01 SDOH — ECONOMIC STABILITY: FOOD INSECURITY: WITHIN THE PAST 12 MONTHS, THE FOOD YOU BOUGHT JUST DIDN'T LAST AND YOU DIDN'T HAVE MONEY TO GET MORE.: NEVER TRUE

## 2023-05-01 SDOH — ECONOMIC STABILITY: INCOME INSECURITY: IN THE LAST 12 MONTHS, WAS THERE A TIME WHEN YOU WERE NOT ABLE TO PAY THE MORTGAGE OR RENT ON TIME?: NO

## 2023-05-01 ASSESSMENT — ENCOUNTER SYMPTOMS
BREAST MASS: 0
HEMATOCHEZIA: 0
FEVER: 0
FREQUENCY: 0
PARESTHESIAS: 0
MYALGIAS: 1
ABDOMINAL PAIN: 0
CONSTIPATION: 0
HEADACHES: 0
DYSURIA: 0
CHILLS: 0
NERVOUS/ANXIOUS: 0
SORE THROAT: 0
DIZZINESS: 0
ARTHRALGIAS: 0
COUGH: 0
EYE PAIN: 0
HEARTBURN: 0
WEAKNESS: 0
PALPITATIONS: 0
DIARRHEA: 0
HEMATURIA: 0
NAUSEA: 0
SHORTNESS OF BREATH: 0
JOINT SWELLING: 0

## 2023-05-01 ASSESSMENT — LIFESTYLE VARIABLES
SKIP TO QUESTIONS 9-10: 1
HOW OFTEN DO YOU HAVE SIX OR MORE DRINKS ON ONE OCCASION: NEVER
HOW MANY STANDARD DRINKS CONTAINING ALCOHOL DO YOU HAVE ON A TYPICAL DAY: 1 OR 2
AUDIT-C TOTAL SCORE: 1
HOW OFTEN DO YOU HAVE A DRINK CONTAINING ALCOHOL: MONTHLY OR LESS

## 2023-05-01 ASSESSMENT — SOCIAL DETERMINANTS OF HEALTH (SDOH)
IN A TYPICAL WEEK, HOW MANY TIMES DO YOU TALK ON THE PHONE WITH FAMILY, FRIENDS, OR NEIGHBORS?: TWICE A WEEK
DO YOU BELONG TO ANY CLUBS OR ORGANIZATIONS SUCH AS CHURCH GROUPS UNIONS, FRATERNAL OR ATHLETIC GROUPS, OR SCHOOL GROUPS?: YES
HOW OFTEN DO YOU ATTEND CHURCH OR RELIGIOUS SERVICES?: NEVER
HOW OFTEN DO YOU GET TOGETHER WITH FRIENDS OR RELATIVES?: ONCE A WEEK

## 2023-05-01 NOTE — PATIENT INSTRUCTIONS
Recommendations from today's MTM visit:                                                    MTM (medication therapy management) is a service provided by a clinical pharmacist designed to help you get the most of out of your medicines.   Today we reviewed what your medicines are for, how to know if they are working, that your medicines are safe and how to make your medicine regimen as easy as possible.      Jase Goetz,     It was great speaking with you today! I have include a summary of some of the considerations we discussed today for your medications. Feel free to reach out by [employer] if you have any additional questions or concerns about your medications.     Complete updated PHQ-9 and RHETT-7 score, sent in [employer] message.   Consider Celebrex twice daily as needed vs scheduled. Discuss this with Dr. Karl Mendoza DDS, [facility]  Scheduled for appointment with provider Starr Gutierres PA-C on 5/3/23     Future medication changes & considerations to discuss with your provider(s): [Primary Provider (Starr Gutierres), Neurologist (Dr. Liu), Jaw Specialist (Dr. Mendoza or Dr. Gastelum)]  Reduce propranolol dose and/or taper off - monitor for improvement in fatigue or worsening migraines  Buspar 20 mg three times daily vs 30 mg three times daily to improve anxiety   Addition of pregabalin or gabapentin for neuropathic/musculoskeletal pain (can also help with anxiety)   Switch in therapy from SSRI (sertraline) to SRNI (duloxetine or venlafaxine) for anti-depressant/anxiety benefits plus neuropathic/musculoskeletal pain      Follow-up: Return in about 1 year (around 5/1/2024) for Follow up, Medication Therapy Management or as needed for medication related questions or concerns.    It was great speaking with you today.  I value your experience and would be very thankful for your time in providing feedback in our clinic survey. In the next few days, you may receive an email or text message from [employer] with a  "link to a survey related to your  clinical pharmacist.\"     To schedule another MTM appointment, please call the clinic directly or you may call the MTM scheduling line at [phone] or toll-free at [phone].     My Clinical Pharmacist's contact information:                                                      Please feel free to contact me with any questions or concerns you have.      Claudia Sexton, Lonnie  MTM Pharmacist Resident  Pager: [phone]     "

## 2023-05-03 ENCOUNTER — OFFICE VISIT (OUTPATIENT)
Dept: FAMILY MEDICINE | Facility: CLINIC | Age: 38
End: 2023-05-03
Payer: COMMERCIAL

## 2023-05-03 VITALS
OXYGEN SATURATION: 98 % | WEIGHT: 145.4 LBS | HEIGHT: 65 IN | SYSTOLIC BLOOD PRESSURE: 113 MMHG | HEART RATE: 93 BPM | TEMPERATURE: 98.3 F | DIASTOLIC BLOOD PRESSURE: 70 MMHG | BODY MASS INDEX: 24.22 KG/M2

## 2023-05-03 DIAGNOSIS — F90.0 ATTENTION DEFICIT HYPERACTIVITY DISORDER (ADHD), PREDOMINANTLY INATTENTIVE TYPE: Primary | ICD-10-CM

## 2023-05-03 PROCEDURE — 99213 OFFICE O/P EST LOW 20 MIN: CPT | Performed by: PHYSICIAN ASSISTANT

## 2023-05-03 RX ORDER — DEXTROAMPHETAMINE SACCHARATE, AMPHETAMINE ASPARTATE MONOHYDRATE, DEXTROAMPHETAMINE SULFATE AND AMPHETAMINE SULFATE 2.5; 2.5; 2.5; 2.5 MG/1; MG/1; MG/1; MG/1
10 CAPSULE, EXTENDED RELEASE ORAL DAILY
Qty: 30 CAPSULE | Refills: 0 | Status: SHIPPED | OUTPATIENT
Start: 2023-05-03 | End: 2023-06-01 | Stop reason: ALTCHOICE

## 2023-05-03 ASSESSMENT — PATIENT HEALTH QUESTIONNAIRE - PHQ9
SUM OF ALL RESPONSES TO PHQ QUESTIONS 1-9: 1
SUM OF ALL RESPONSES TO PHQ QUESTIONS 1-9: 1
10. IF YOU CHECKED OFF ANY PROBLEMS, HOW DIFFICULT HAVE THESE PROBLEMS MADE IT FOR YOU TO DO YOUR WORK, TAKE CARE OF THINGS AT HOME, OR GET ALONG WITH OTHER PEOPLE: NOT DIFFICULT AT ALL

## 2023-05-03 ASSESSMENT — PAIN SCALES - GENERAL: PAINLEVEL: NO PAIN (0)

## 2023-05-03 NOTE — PROGRESS NOTES
"  Assessment & Plan     Attention deficit hyperactivity disorder (ADHD), predominantly inattentive type  Risks/benefits of medication use discussed with patient. Patient reports understanding and accepts trial of medication.  Follow-up video visit in 3-4 weeks.    - amphetamine-dextroamphetamine (ADDERALL XR) 10 MG 24 hr capsule; Take 1 capsule (10 mg) by mouth daily    Starr Gutierres PA-C  [facility] SCOTT Stephenson is a 37 year old, presenting for the following health issues:  Establish Care and Physical        5/3/2023     3:45 PM   Additional Questions   Roomed by Suzy White [facility] ADHD Follow-Up (Adult)  Concerns:previous dx of adhd  Changes since last visit: Worse increased distractibility and finishing tasks   Taking controlled (daily) medications as prescribed:no, does not have med  Sleep: no problems  Adult ADHD Self-Reporting form given to patient?:  No  Currently in counseling: No    Medication Benefits:   Controlled symptoms: None  Uncontrolled symptoms:  Attention span, Distractability and Finishing tasks    Medication Side Effects:  Reports:  none  Sleep Problems? no  ++++++++++++++++++++++++++++++++++++++++++++++++    Employer Concerns/Feedback: None  Coworker Concerns:   None  Home/Family Concerns: None      Patient's mood has been stable for anxiety and depression with current meds.           Review of Systems   Constitutional, HEENT, cardiovascular, pulmonary, gi and gu systems are negative, except as otherwise noted.      Objective    /70 (BP Location: Right arm, Patient Position: Sitting, Cuff Size: Adult Regular)   Pulse 93   Temp 98.3  F (36.8  C) (Oral)   Ht 1.651 m (5' 5\")   Wt 66 kg (145 lb 6.4 oz)   LMP 04/26/2023 (Approximate)   SpO2 98%   BMI 24.20 kg/m    Body mass index is 24.2 kg/m .  Physical Exam   GENERAL APPEARANCE: healthy, alert and no distress  PSYCH: mentation appears normal and affect normal/bright            "

## 2023-05-18 ENCOUNTER — MYC MEDICAL ADVICE (OUTPATIENT)
Dept: FAMILY MEDICINE | Facility: CLINIC | Age: 38
End: 2023-05-18
Payer: COMMERCIAL

## 2023-05-18 DIAGNOSIS — F90.0 ATTENTION DEFICIT HYPERACTIVITY DISORDER (ADHD), PREDOMINANTLY INATTENTIVE TYPE: Primary | ICD-10-CM

## 2023-06-01 ENCOUNTER — VIRTUAL VISIT (OUTPATIENT)
Dept: FAMILY MEDICINE | Facility: CLINIC | Age: 38
End: 2023-06-01
Payer: COMMERCIAL

## 2023-06-01 ENCOUNTER — MYC MEDICAL ADVICE (OUTPATIENT)
Dept: FAMILY MEDICINE | Facility: CLINIC | Age: 38
End: 2023-06-01

## 2023-06-01 DIAGNOSIS — F90.0 ATTENTION DEFICIT HYPERACTIVITY DISORDER (ADHD), PREDOMINANTLY INATTENTIVE TYPE: ICD-10-CM

## 2023-06-01 PROCEDURE — 99213 OFFICE O/P EST LOW 20 MIN: CPT | Mod: VID | Performed by: PHYSICIAN ASSISTANT

## 2023-06-01 RX ORDER — METHYLPHENIDATE HYDROCHLORIDE 20 MG/1
20 CAPSULE, EXTENDED RELEASE ORAL DAILY
Qty: 30 CAPSULE | Refills: 0 | Status: SHIPPED | OUTPATIENT
Start: 2023-06-01 | End: 2023-06-12 | Stop reason: ALTCHOICE

## 2023-06-01 NOTE — PROGRESS NOTES
Seema is a 38 year old who is being evaluated via a billable video visit.      How would you like to obtain your AVS? MyChart  If the video visit is dropped, the invitation should be resent by: Text to cell phone: [phone]  Will anyone else be joining your video visit? No        Assessment & Plan     Attention deficit hyperactivity disorder (ADHD), predominantly inattentive type  Will change over to Ritalin. mychart in 1 week.  Discussed other LA formulations vs. Concerta  - methylphenidate (RITALIN LA) 20 MG 24 hr capsule; Take 20 mg by mouth daily               Starr Gutierres PA-C  [facility]   Seema is a 38 year old, presenting for the following health issues:  A.D.H.D and Recheck Medication (Adderall/)        6/1/2023     8:59 AM   Additional Questions   Roomed by MINE Mason.H.[email or web address]    History of Present Illness       Reason for visit:  F/u ADD medication start    She eats 4 or more servings of fruits and vegetables daily.She consumes 0 sweetened beverage(s) daily.She exercises with enough effort to increase her heart rate 10 to 19 minutes per day.  She exercises with enough effort to increase her heart rate 3 or less days per week.   She is taking medications regularly.       Medication Followup of Adderall    Taking Medication as prescribed: yes    Side Effects:  None    Medication Helping Symptoms:  yes    ADHD Follow-Up (Adult)  Concerns: none  Changes since last visit: Improving  Taking controlled (daily) medications as prescribed: Yes  Sleep: trouble falling asleep  Adult ADHD Self-Reporting form given to patient?:  No  Currently in counseling: No    Medication Benefits:   Controlled symptoms: Attention span and Distractability  Uncontrolled symptoms:  Finishing tasks    Medication Side Effects:  Reports:  headache  Sleep Problems? Yes Details: unsure if related to meds or life stress  ++++++++++++++++++++++++++++++++++++++++++++++++    Employer  "Concerns/Feedback: Stable  Coworker Concerns:   Stable  Home/Family Concerns: Stable            Review of Systems   Constitutional, HEENT, cardiovascular, pulmonary, gi and gu systems are negative, except as otherwise noted.      Objective    Vitals - Patient Reported  Weight (Patient Reported): 68 kg (150 lb)  Height (Patient Reported): 163.8 cm (5' 4.5\")  BMI (Based on Pt Reported Ht/Wt): 25.35        Physical Exam   GENERAL: Healthy, alert and no distress  EYES: Eyes grossly normal to inspection.  No discharge or erythema, or obvious scleral/conjunctival abnormalities.  RESP: No audible wheeze, cough, or visible cyanosis.  No visible retractions or increased work of breathing.    SKIN: Visible skin clear. No significant rash, abnormal pigmentation or lesions.  NEURO: Cranial nerves grossly intact.  Mentation and speech appropriate for age.  PSYCH: Mentation appears normal, affect normal/bright, judgement and insight intact, normal speech and appearance well-groomed.                Video-Visit Details    Type of service:  Video Visit     Originating Location (pt. Location): Home  Distant Location (provider location):  Off-site  Platform used for Video Visit: Neville    "

## 2023-06-12 RX ORDER — LISDEXAMFETAMINE DIMESYLATE 60 MG/1
60 CAPSULE ORAL EVERY MORNING
Qty: 30 CAPSULE | Refills: 0 | Status: SHIPPED | OUTPATIENT
Start: 2023-06-12 | End: 2023-07-07

## 2023-07-06 ENCOUNTER — MYC MEDICAL ADVICE (OUTPATIENT)
Dept: FAMILY MEDICINE | Facility: CLINIC | Age: 38
End: 2023-07-06
Payer: COMMERCIAL

## 2023-07-06 DIAGNOSIS — F90.0 ATTENTION DEFICIT HYPERACTIVITY DISORDER (ADHD), PREDOMINANTLY INATTENTIVE TYPE: ICD-10-CM

## 2023-07-07 RX ORDER — LISDEXAMFETAMINE DIMESYLATE 60 MG/1
60 CAPSULE ORAL EVERY MORNING
Qty: 30 CAPSULE | Refills: 0 | Status: SHIPPED | OUTPATIENT
Start: 2023-07-12 | End: 2024-03-21

## 2023-07-07 RX ORDER — LISDEXAMFETAMINE DIMESYLATE 60 MG/1
60 CAPSULE ORAL EVERY MORNING
Qty: 30 CAPSULE | Refills: 0 | Status: SHIPPED | OUTPATIENT
Start: 2023-08-12 | End: 2023-09-26

## 2023-08-01 NOTE — TELEPHONE ENCOUNTER
"Requested Prescriptions   Pending Prescriptions Disp Refills     metoclopramide (REGLAN) 5 MG tablet 30 tablet 1     Sig: Take 1 tablet (5 mg) by mouth 4 times daily as needed (for nausea and vomiting)        Antivertigo/Antiemetic Agents Passed - 6/4/2019  3:24 PM        Passed - Recent (12 mo) or future (30 days) visit within the authorizing provider's specialty     Patient had office visit in the last 12 months or has a visit in the next 30 days with authorizing provider or within the authorizing provider's specialty.  See \"Patient Info\" tab in inbasket, or \"Choose Columns\" in Meds & Orders section of the refill encounter.              Passed - Medication is active on med list        Passed - Patient is 18 years of age or older        rx approved per [facility] protocol.      Mary Jo Gutierrez RN    "
No

## 2023-09-11 ENCOUNTER — MYC MEDICAL ADVICE (OUTPATIENT)
Dept: FAMILY MEDICINE | Facility: CLINIC | Age: 38
End: 2023-09-11
Payer: COMMERCIAL

## 2023-09-11 DIAGNOSIS — F90.0 ATTENTION DEFICIT HYPERACTIVITY DISORDER (ADHD), PREDOMINANTLY INATTENTIVE TYPE: Primary | ICD-10-CM

## 2023-09-11 NOTE — TELEPHONE ENCOUNTER
Chris vieira, closed by accident.  See [employer] message below.  T'd up chews.  Please advise.  Natalie Singer RN

## 2023-09-12 RX ORDER — LISDEXAMFETAMINE DIMESYLATE 60 MG/1
60 TABLET, CHEWABLE ORAL DAILY
Qty: 30 TABLET | Refills: 0 | Status: SHIPPED | OUTPATIENT
Start: 2023-09-12 | End: 2023-11-15

## 2023-09-26 ENCOUNTER — MYC MEDICAL ADVICE (OUTPATIENT)
Dept: FAMILY MEDICINE | Facility: CLINIC | Age: 38
End: 2023-09-26
Payer: COMMERCIAL

## 2023-09-26 DIAGNOSIS — F90.0 ATTENTION DEFICIT HYPERACTIVITY DISORDER (ADHD), PREDOMINANTLY INATTENTIVE TYPE: ICD-10-CM

## 2023-09-26 RX ORDER — LISDEXAMFETAMINE DIMESYLATE 60 MG/1
60 CAPSULE ORAL EVERY MORNING
Qty: 30 CAPSULE | Refills: 0 | Status: SHIPPED | OUTPATIENT
Start: 2023-09-26 | End: 2023-11-15

## 2023-10-05 ENCOUNTER — MYC MEDICAL ADVICE (OUTPATIENT)
Dept: FAMILY MEDICINE | Facility: CLINIC | Age: 38
End: 2023-10-05
Payer: COMMERCIAL

## 2023-10-05 DIAGNOSIS — F33.42 RECURRENT MAJOR DEPRESSION IN COMPLETE REMISSION (H): ICD-10-CM

## 2023-10-05 DIAGNOSIS — F41.1 GAD (GENERALIZED ANXIETY DISORDER): ICD-10-CM

## 2023-10-05 DIAGNOSIS — F41.9 ANXIETY: ICD-10-CM

## 2023-10-11 RX ORDER — BUSPIRONE HYDROCHLORIDE 30 MG/1
TABLET ORAL
Qty: 180 TABLET | Refills: 0 | Status: SHIPPED | OUTPATIENT
Start: 2023-10-11 | End: 2023-11-15

## 2023-10-11 RX ORDER — SERTRALINE HYDROCHLORIDE 100 MG/1
150 TABLET, FILM COATED ORAL DAILY
Qty: 135 TABLET | Refills: 0 | Status: SHIPPED | OUTPATIENT
Start: 2023-10-11 | End: 2023-11-15

## 2023-10-13 ENCOUNTER — MYC MEDICAL ADVICE (OUTPATIENT)
Dept: FAMILY MEDICINE | Facility: CLINIC | Age: 38
End: 2023-10-13
Payer: COMMERCIAL

## 2023-10-13 DIAGNOSIS — F33.42 RECURRENT MAJOR DEPRESSION IN COMPLETE REMISSION (H): Primary | ICD-10-CM

## 2023-10-13 DIAGNOSIS — F41.1 GAD (GENERALIZED ANXIETY DISORDER): ICD-10-CM

## 2023-10-13 DIAGNOSIS — F41.9 ANXIETY: ICD-10-CM

## 2023-10-13 RX ORDER — BUSPIRONE HYDROCHLORIDE 30 MG/1
30 TABLET ORAL 2 TIMES DAILY
Qty: 14 TABLET | Refills: 0 | Status: SHIPPED | OUTPATIENT
Start: 2023-10-13 | End: 2023-11-15

## 2023-11-08 SDOH — HEALTH STABILITY: PHYSICAL HEALTH: ON AVERAGE, HOW MANY MINUTES DO YOU ENGAGE IN EXERCISE AT THIS LEVEL?: 30 MIN

## 2023-11-08 SDOH — HEALTH STABILITY: PHYSICAL HEALTH: ON AVERAGE, HOW MANY DAYS PER WEEK DO YOU ENGAGE IN MODERATE TO STRENUOUS EXERCISE (LIKE A BRISK WALK)?: 1 DAY

## 2023-11-08 ASSESSMENT — ENCOUNTER SYMPTOMS
NAUSEA: 0
WEAKNESS: 0
CONSTIPATION: 0
BREAST MASS: 0
SORE THROAT: 0
DIARRHEA: 0
PARESTHESIAS: 0
ARTHRALGIAS: 0
COUGH: 0
MYALGIAS: 0
DYSURIA: 0
FEVER: 0
NERVOUS/ANXIOUS: 0
JOINT SWELLING: 0
CHILLS: 0
ABDOMINAL PAIN: 0
DIZZINESS: 0
SHORTNESS OF BREATH: 0
EYE PAIN: 0
HEMATOCHEZIA: 0
HEMATURIA: 0
PALPITATIONS: 0
HEADACHES: 1
HEARTBURN: 0
FREQUENCY: 0

## 2023-11-08 ASSESSMENT — LIFESTYLE VARIABLES
HOW OFTEN DO YOU HAVE A DRINK CONTAINING ALCOHOL: NEVER
SKIP TO QUESTIONS 9-10: 1
HOW MANY STANDARD DRINKS CONTAINING ALCOHOL DO YOU HAVE ON A TYPICAL DAY: PATIENT DOES NOT DRINK
HOW OFTEN DO YOU HAVE SIX OR MORE DRINKS ON ONE OCCASION: NEVER
AUDIT-C TOTAL SCORE: 0

## 2023-11-08 ASSESSMENT — SOCIAL DETERMINANTS OF HEALTH (SDOH)
HOW OFTEN DO YOU ATTEND CHURCH OR RELIGIOUS SERVICES?: NEVER
DO YOU BELONG TO ANY CLUBS OR ORGANIZATIONS SUCH AS CHURCH GROUPS UNIONS, FRATERNAL OR ATHLETIC GROUPS, OR SCHOOL GROUPS?: YES
HOW OFTEN DO YOU GET TOGETHER WITH FRIENDS OR RELATIVES?: ONCE A WEEK
IN A TYPICAL WEEK, HOW MANY TIMES DO YOU TALK ON THE PHONE WITH FAMILY, FRIENDS, OR NEIGHBORS?: TWICE A WEEK
HOW OFTEN DO YOU ATTENT MEETINGS OF THE CLUB OR ORGANIZATION YOU BELONG TO?: MORE THAN 4 TIMES PER YEAR

## 2023-11-15 ENCOUNTER — OFFICE VISIT (OUTPATIENT)
Dept: FAMILY MEDICINE | Facility: CLINIC | Age: 38
End: 2023-11-15
Payer: COMMERCIAL

## 2023-11-15 VITALS
WEIGHT: 138 LBS | DIASTOLIC BLOOD PRESSURE: 69 MMHG | TEMPERATURE: 97.5 F | HEIGHT: 65 IN | SYSTOLIC BLOOD PRESSURE: 102 MMHG | BODY MASS INDEX: 22.99 KG/M2 | HEART RATE: 74 BPM | OXYGEN SATURATION: 100 % | RESPIRATION RATE: 17 BRPM

## 2023-11-15 DIAGNOSIS — F33.42 RECURRENT MAJOR DEPRESSION IN COMPLETE REMISSION (H): ICD-10-CM

## 2023-11-15 DIAGNOSIS — Z01.84 IMMUNITY STATUS TESTING: ICD-10-CM

## 2023-11-15 DIAGNOSIS — F90.0 ATTENTION DEFICIT HYPERACTIVITY DISORDER (ADHD), PREDOMINANTLY INATTENTIVE TYPE: ICD-10-CM

## 2023-11-15 DIAGNOSIS — F41.1 GAD (GENERALIZED ANXIETY DISORDER): ICD-10-CM

## 2023-11-15 DIAGNOSIS — Z00.00 ROUTINE HISTORY AND PHYSICAL EXAMINATION OF ADULT: Primary | ICD-10-CM

## 2023-11-15 DIAGNOSIS — E55.9 VITAMIN D DEFICIENCY: ICD-10-CM

## 2023-11-15 DIAGNOSIS — F41.9 ANXIETY: ICD-10-CM

## 2023-11-15 DIAGNOSIS — M26.609 TMJ DYSFUNCTION: ICD-10-CM

## 2023-11-15 LAB
ALBUMIN SERPL BCG-MCNC: 4.6 G/DL (ref 3.5–5.2)
ALP SERPL-CCNC: 54 U/L (ref 40–150)
ALT SERPL W P-5'-P-CCNC: 12 U/L (ref 0–50)
ANION GAP SERPL CALCULATED.3IONS-SCNC: 9 MMOL/L (ref 7–15)
AST SERPL W P-5'-P-CCNC: 19 U/L (ref 0–45)
BILIRUB SERPL-MCNC: 0.5 MG/DL
BUN SERPL-MCNC: 10.5 MG/DL (ref 6–20)
CALCIUM SERPL-MCNC: 9.8 MG/DL (ref 8.6–10)
CHLORIDE SERPL-SCNC: 103 MMOL/L (ref 98–107)
CHOLEST SERPL-MCNC: 174 MG/DL
CREAT SERPL-MCNC: 0.96 MG/DL (ref 0.51–0.95)
DEPRECATED HCO3 PLAS-SCNC: 28 MMOL/L (ref 22–29)
EGFRCR SERPLBLD CKD-EPI 2021: 77 ML/MIN/1.73M2
ERYTHROCYTE [DISTWIDTH] IN BLOOD BY AUTOMATED COUNT: 12.1 % (ref 10–15)
GLUCOSE SERPL-MCNC: 86 MG/DL (ref 70–99)
HBA1C MFR BLD: 5.1 % (ref 0–5.6)
HBV SURFACE AB SERPL IA-ACNC: 78.74 M[IU]/ML
HBV SURFACE AB SERPL IA-ACNC: REACTIVE M[IU]/ML
HCT VFR BLD AUTO: 37.2 % (ref 35–47)
HDLC SERPL-MCNC: 47 MG/DL
HGB BLD-MCNC: 12.8 G/DL (ref 11.7–15.7)
LDLC SERPL CALC-MCNC: 110 MG/DL
MCH RBC QN AUTO: 31.3 PG (ref 26.5–33)
MCHC RBC AUTO-ENTMCNC: 34.4 G/DL (ref 31.5–36.5)
MCV RBC AUTO: 91 FL (ref 78–100)
NONHDLC SERPL-MCNC: 127 MG/DL
PLATELET # BLD AUTO: 228 10E3/UL (ref 150–450)
POTASSIUM SERPL-SCNC: 4.8 MMOL/L (ref 3.4–5.3)
PROT SERPL-MCNC: 6.9 G/DL (ref 6.4–8.3)
RBC # BLD AUTO: 4.09 10E6/UL (ref 3.8–5.2)
SODIUM SERPL-SCNC: 140 MMOL/L (ref 135–145)
TRIGL SERPL-MCNC: 87 MG/DL
VIT D+METAB SERPL-MCNC: 55 NG/ML (ref 20–50)
WBC # BLD AUTO: 3.4 10E3/UL (ref 4–11)

## 2023-11-15 PROCEDURE — 80053 COMPREHEN METABOLIC PANEL: CPT | Performed by: PHYSICIAN ASSISTANT

## 2023-11-15 PROCEDURE — 83036 HEMOGLOBIN GLYCOSYLATED A1C: CPT | Performed by: PHYSICIAN ASSISTANT

## 2023-11-15 PROCEDURE — 36415 COLL VENOUS BLD VENIPUNCTURE: CPT | Performed by: PHYSICIAN ASSISTANT

## 2023-11-15 PROCEDURE — 99395 PREV VISIT EST AGE 18-39: CPT | Performed by: PHYSICIAN ASSISTANT

## 2023-11-15 PROCEDURE — 85027 COMPLETE CBC AUTOMATED: CPT | Performed by: PHYSICIAN ASSISTANT

## 2023-11-15 PROCEDURE — 80061 LIPID PANEL: CPT | Performed by: PHYSICIAN ASSISTANT

## 2023-11-15 PROCEDURE — 99213 OFFICE O/P EST LOW 20 MIN: CPT | Mod: 25 | Performed by: PHYSICIAN ASSISTANT

## 2023-11-15 PROCEDURE — 82306 VITAMIN D 25 HYDROXY: CPT | Performed by: PHYSICIAN ASSISTANT

## 2023-11-15 PROCEDURE — 86706 HEP B SURFACE ANTIBODY: CPT | Performed by: PHYSICIAN ASSISTANT

## 2023-11-15 RX ORDER — BUSPIRONE HYDROCHLORIDE 30 MG/1
TABLET ORAL
Qty: 180 TABLET | Refills: 3 | Status: SHIPPED | OUTPATIENT
Start: 2023-11-15

## 2023-11-15 RX ORDER — LORAZEPAM 1 MG/1
1 TABLET ORAL DAILY PRN
Qty: 60 TABLET | Refills: 0 | Status: SHIPPED | OUTPATIENT
Start: 2023-11-15

## 2023-11-15 RX ORDER — SERTRALINE HYDROCHLORIDE 100 MG/1
150 TABLET, FILM COATED ORAL DAILY
Qty: 135 TABLET | Refills: 3 | Status: SHIPPED | OUTPATIENT
Start: 2023-11-15

## 2023-11-15 RX ORDER — LISDEXAMFETAMINE DIMESYLATE 60 MG/1
60 CAPSULE ORAL EVERY MORNING
Qty: 90 CAPSULE | Refills: 0 | Status: SHIPPED | OUTPATIENT
Start: 2023-11-15 | End: 2023-11-21

## 2023-11-15 ASSESSMENT — ENCOUNTER SYMPTOMS
HEMATURIA: 0
PALPITATIONS: 0
DYSURIA: 0
SORE THROAT: 0
JOINT SWELLING: 0
ABDOMINAL PAIN: 0
EYE PAIN: 0
CHILLS: 0
NERVOUS/ANXIOUS: 0
COUGH: 0
SHORTNESS OF BREATH: 0
HEADACHES: 1
WEAKNESS: 0
HEARTBURN: 0
BREAST MASS: 0
CONSTIPATION: 0
DIARRHEA: 0
DIZZINESS: 0
ARTHRALGIAS: 0
PARESTHESIAS: 0
HEMATOCHEZIA: 0
FREQUENCY: 0
MYALGIAS: 0
NAUSEA: 0
FEVER: 0

## 2023-11-15 ASSESSMENT — PATIENT HEALTH QUESTIONNAIRE - PHQ9
SUM OF ALL RESPONSES TO PHQ QUESTIONS 1-9: 1
SUM OF ALL RESPONSES TO PHQ QUESTIONS 1-9: 1
10. IF YOU CHECKED OFF ANY PROBLEMS, HOW DIFFICULT HAVE THESE PROBLEMS MADE IT FOR YOU TO DO YOUR WORK, TAKE CARE OF THINGS AT HOME, OR GET ALONG WITH OTHER PEOPLE: NOT DIFFICULT AT ALL
SUM OF ALL RESPONSES TO PHQ QUESTIONS 1-9: 1

## 2023-11-15 ASSESSMENT — PAIN SCALES - GENERAL: PAINLEVEL: NO PAIN (0)

## 2023-11-15 NOTE — PROGRESS NOTES
SUBJECTIVE:   Seema is a 38 year old, presenting for the following:  Physical        11/15/2023     7:19 AM   Additional Questions   Roomed by Mecca Cano       Healthy Habits:     Getting at least 3 servings of Calcium per day:  Yes    Bi-annual eye exam:  Yes    Dental care twice a year:  Yes    Sleep apnea or symptoms of sleep apnea:  None    Diet:  Regular (no restrictions)    Frequency of exercise:  1 day/week    Duration of exercise:  Less than 15 minutes    Taking medications regularly:  Yes    Medication side effects:  None    Additional concerns today:  No              Anxiety Follow-Up  How are you doing with your anxiety since your last visit? No change  Are you having other symptoms that might be associated with anxiety? No  Have you had a significant life event? No   Are you feeling depressed? No  Do you have any concerns with your use of alcohol or other drugs? No    Social History     Tobacco Use    Smoking status: Never    Smokeless tobacco: Never   Vaping Use    Vaping Use: Never used   Substance Use Topics    Alcohol use: No     Comment: One drink/week (prior to pregnancy)    Drug use: No         10/4/2017     3:07 PM 2/23/2019     8:03 AM 5/1/2023     9:21 PM   RHETT-7 SCORE   Total Score  2 (minimal anxiety) 3 (minimal anxiety)   Total Score 0 2 3         5/3/2023     3:45 PM 11/15/2023     7:18 AM 11/15/2023     7:29 AM   PHQ   PHQ-9 Total Score 1 1 1   Q9: Thoughts of better off dead/self-harm past 2 weeks Not at all Not at all Not at all         11/15/2023     7:29 AM   Last PHQ-9   1.  Little interest or pleasure in doing things 0   2.  Feeling down, depressed, or hopeless 0   3.  Trouble falling or staying asleep, or sleeping too much 1   4.  Feeling tired or having little energy 0   5.  Poor appetite or overeating 0   6.  Feeling bad about yourself 0   7.  Trouble concentrating 0   8.  Moving slowly or restless 0   Q9: Thoughts of better off dead/self-harm past 2 weeks 0   PHQ-9 Total  Score 1         5/1/2023     9:21 PM   RHETT-7    1. Feeling nervous, anxious, or on edge 1   2. Not being able to stop or control worrying 0   3. Worrying too much about different things 0   4. Trouble relaxing 1   5. Being so restless that it is hard to sit still 0   6. Becoming easily annoyed or irritable 1   7. Feeling afraid, as if something awful might happen 0   RHETT-7 Total Score 3   If you checked any problems, how difficult have they made it for you to do your work, take care of things at home, or get along with other people? Not difficult at all     Follow-up on Vyvanse. Feels like it's working well for her.   Insurance will pay for 90 day supply to mail order.    Refill on Ativan for TMJ. May want to see new specialist       Social History     Tobacco Use    Smoking status: Never    Smokeless tobacco: Never   Substance Use Topics    Alcohol use: No     Comment: One drink/week (prior to pregnancy)             11/8/2023     8:27 PM   Alcohol Use   Prescreen: >3 drinks/day or >7 drinks/week? No     Reviewed orders with patient.  Reviewed health maintenance and updated orders accordingly - Yes  Lab work is in process    Breast Cancer Screening:    FHS-7:       9/7/2022    11:16 AM 5/1/2023     9:17 PM 11/8/2023     8:32 PM   Breast CA Risk Assessment (FHS-7)   Did any of your first-degree relatives have breast or ovarian cancer? No No No   Did any of your relatives have bilateral breast cancer? No No No   Did any man in your family have breast cancer? No No No   Did any woman in your family have breast and ovarian cancer? Yes Yes Yes   Did any woman in your family have breast cancer before age 50 y? No No No   Do you have 2 or more relatives with breast and/or ovarian cancer? No No No   Do you have 2 or more relatives with breast and/or bowel cancer? No No No       Patient under 40 years of age: Routine Mammogram Screening not recommended.   Pertinent mammograms are reviewed under the imaging tab.    History of  abnormal Pap smear: NO - age 30-65 PAP every 5 years with negative HPV co-testing recommended      Latest Ref Rng & Units 8/11/2021     4:35 PM 6/9/2016     2:01 PM 6/9/2016    12:00 AM   PAP / HPV   PAP  Negative for Intraepithelial Lesion or Malignancy (NILM)      PAP (Historical)    NIL    HPV 16 DNA Negative Negative  Negative     HPV 18 DNA Negative Negative  Negative     Other HR HPV Negative Negative  Negative       Reviewed and updated as needed this visit by clinical staff                  Reviewed and updated as needed this visit by Provider                 Past Medical History:   Diagnosis Date    Abnormal Pap smear of cervix 2004    Treated with Cryo.  Normal paps since then.  Last pap 4/2016    Anxiety state, unspecified     On Zoloft, well controlled. Managed by Dr. Zhu/Marianne    Battery end of life of spinal cord stimulator     Constipation     Constipation     Depressive disorder     Anxiety    H/O: depression     History of vaccination against human papillomavirus     Migraine, unspecified, without mention of intractable migraine without mention of status migrainosus     Managed by Dr. Zhu/Marianne    OAB (overactive bladder)     interstim initial     Temporomandibular joint disorders, unspecified     TMJ (dislocation of temporomandibular joint) middle school    Has taken Zanaflex.  Has also had surgeries.    Urinary problem         Review of Systems   Constitutional:  Negative for chills and fever.   HENT:  Negative for congestion, ear pain, hearing loss and sore throat.    Eyes:  Negative for pain and visual disturbance.   Respiratory:  Negative for cough and shortness of breath.    Cardiovascular:  Negative for chest pain, palpitations and peripheral edema.   Gastrointestinal:  Negative for abdominal pain, constipation, diarrhea, heartburn, hematochezia and nausea.   Breasts:  Negative for tenderness, breast mass and discharge.   Genitourinary:  Negative for dysuria, frequency, genital sores,  "hematuria, pelvic pain, urgency, vaginal bleeding and vaginal discharge.   Musculoskeletal:  Negative for arthralgias, joint swelling and myalgias.   Skin:  Negative for rash.   Neurological:  Positive for headaches. Negative for dizziness, weakness and paresthesias.   Psychiatric/Behavioral:  Negative for mood changes. The patient is not nervous/anxious.           OBJECTIVE:   /69 (BP Location: Right arm, Patient Position: Sitting, Cuff Size: Adult Regular)   Pulse 74   Temp 97.5  F (36.4  C) (Oral)   Resp 17   Ht 1.651 m (5' 5\")   Wt 62.6 kg (138 lb)   LMP 11/01/2023   SpO2 100%   Breastfeeding No   BMI 22.96 kg/m    Physical Exam  GENERAL: healthy, alert and no distress  EYES: Eyes grossly normal to inspection, PERRL and conjunctivae and sclerae normal  HENT: ear canals and TM's normal, nose and mouth without ulcers or lesions  NECK: no adenopathy, no asymmetry, masses, or scars and thyroid normal to palpation  RESP: lungs clear to auscultation - no rales, rhonchi or wheezes  CV: regular rate and rhythm, normal S1 S2, no S3 or S4, no murmur, click or rub, no peripheral edema and peripheral pulses strong  ABDOMEN: soft, nontender, no hepatosplenomegaly, no masses and bowel sounds normal  MS: no gross musculoskeletal defects noted, no edema  SKIN: no suspicious lesions or rashes  NEURO: Normal strength and tone, mentation intact and speech normal  PSYCH: mentation appears normal, affect normal/bright        ASSESSMENT/PLAN:   (Z00.00) Routine history and physical examination of adult  (primary encounter diagnosis)  Comment:   Plan: CBC with platelets, Comprehensive metabolic         panel (BMP + Alb, Alk Phos, ALT, AST, Total.         Bili, TP), Lipid panel reflex to direct LDL         Fasting, Hemoglobin A1c            (F41.1) RHETT (generalized anxiety disorder)  Comment: stable with meds.   Plan: LORazepam (ATIVAN) 1 MG tablet, OFFICE/OUTPT         VISIT,EST,LEVL III            (M26.609) TMJ " dysfunction  Comment: new referral given. Ativan as needed. Continue splints  Plan: Pain Management  Referral,         OFFICE/OUTPT VISIT,EST,LEVL III            (F90.0) Attention deficit hyperactivity disorder (ADHD), predominantly inattentive type  Comment: stable with meds.   Plan: lisdexamfetamine (VYVANSE) 60 MG capsule,         OFFICE/OUTPT VISIT,EST,LEVL III            (Z01.84) Immunity status testing  Comment:   Plan: Hepatitis B Surface Antibody            (E55.9) Vitamin D deficiency  Comment:   Plan: Vitamin D Deficiency            Patient has been advised of split billing requirements and indicates understanding: No      COUNSELING:  Reviewed preventive health counseling, as reflected in patient instructions       Regular exercise       Healthy diet/nutrition       Vision screening        She reports that she has never smoked. She has never used smokeless tobacco.          NEO Goff [facility]

## 2023-11-20 ENCOUNTER — MYC MEDICAL ADVICE (OUTPATIENT)
Dept: FAMILY MEDICINE | Facility: CLINIC | Age: 38
End: 2023-11-20
Payer: COMMERCIAL

## 2023-11-20 DIAGNOSIS — F90.0 ATTENTION DEFICIT HYPERACTIVITY DISORDER (ADHD), PREDOMINANTLY INATTENTIVE TYPE: ICD-10-CM

## 2023-11-21 ENCOUNTER — MYC MEDICAL ADVICE (OUTPATIENT)
Dept: FAMILY MEDICINE | Facility: CLINIC | Age: 38
End: 2023-11-21
Payer: COMMERCIAL

## 2023-11-21 DIAGNOSIS — D72.819 LEUKOPENIA, UNSPECIFIED TYPE: Primary | ICD-10-CM

## 2023-11-21 RX ORDER — LISDEXAMFETAMINE DIMESYLATE 60 MG/1
60 CAPSULE ORAL EVERY MORNING
Qty: 90 CAPSULE | Refills: 0 | Status: SHIPPED | OUTPATIENT
Start: 2023-11-21 | End: 2023-12-08

## 2023-11-21 NOTE — TELEPHONE ENCOUNTER
Routed to PCP, see [employer] message request, mail order out of [place], inform pt when final  Brook Oseguera RN, BSN  [facility]

## 2023-11-21 NOTE — TELEPHONE ENCOUNTER
Starr Gutierres PA-C, please see mychart. Pt's WBC count was 3.4 on 11/15/23    Routing to PCP to review and advise.  Brooke PEREZ RN

## 2023-12-07 ENCOUNTER — TRANSFERRED RECORDS (OUTPATIENT)
Dept: HEALTH INFORMATION MANAGEMENT | Facility: CLINIC | Age: 38
End: 2023-12-07
Payer: COMMERCIAL

## 2023-12-08 ENCOUNTER — MYC MEDICAL ADVICE (OUTPATIENT)
Dept: FAMILY MEDICINE | Facility: CLINIC | Age: 38
End: 2023-12-08
Payer: COMMERCIAL

## 2023-12-08 DIAGNOSIS — F90.0 ATTENTION DEFICIT HYPERACTIVITY DISORDER (ADHD), PREDOMINANTLY INATTENTIVE TYPE: ICD-10-CM

## 2023-12-08 RX ORDER — LISDEXAMFETAMINE DIMESYLATE 60 MG/1
60 CAPSULE ORAL EVERY MORNING
Qty: 30 CAPSULE | Refills: 0 | Status: SHIPPED | OUTPATIENT
Start: 2023-12-08 | End: 2024-01-08

## 2023-12-08 RX ORDER — LISDEXAMFETAMINE DIMESYLATE 60 MG/1
60 CAPSULE ORAL EVERY MORNING
Qty: 30 CAPSULE | Refills: 0 | Status: SHIPPED | OUTPATIENT
Start: 2023-12-08 | End: 2023-12-08

## 2023-12-08 NOTE — TELEPHONE ENCOUNTER
See Mychart response, NOW WANTS sent to [employer] [facility] on [place] (not [place]) routed to CJ, please approve  Brook Oseguera, RN, BSN  [facility]

## 2024-01-04 ENCOUNTER — LAB (OUTPATIENT)
Dept: LAB | Facility: CLINIC | Age: 39
End: 2024-01-04
Payer: COMMERCIAL

## 2024-01-04 DIAGNOSIS — D72.819 LEUKOPENIA, UNSPECIFIED TYPE: ICD-10-CM

## 2024-01-04 LAB
ERYTHROCYTE [DISTWIDTH] IN BLOOD BY AUTOMATED COUNT: 11.6 % (ref 10–15)
HCT VFR BLD AUTO: 38.3 % (ref 35–47)
HGB BLD-MCNC: 13.3 G/DL (ref 11.7–15.7)
MCH RBC QN AUTO: 31.4 PG (ref 26.5–33)
MCHC RBC AUTO-ENTMCNC: 34.7 G/DL (ref 31.5–36.5)
MCV RBC AUTO: 91 FL (ref 78–100)
PLATELET # BLD AUTO: 246 10E3/UL (ref 150–450)
RBC # BLD AUTO: 4.23 10E6/UL (ref 3.8–5.2)
WBC # BLD AUTO: 7.4 10E3/UL (ref 4–11)

## 2024-01-04 PROCEDURE — 36415 COLL VENOUS BLD VENIPUNCTURE: CPT

## 2024-01-04 PROCEDURE — 85027 COMPLETE CBC AUTOMATED: CPT

## 2024-01-08 ENCOUNTER — MYC REFILL (OUTPATIENT)
Dept: FAMILY MEDICINE | Facility: CLINIC | Age: 39
End: 2024-01-08
Payer: COMMERCIAL

## 2024-01-08 ENCOUNTER — MYC MEDICAL ADVICE (OUTPATIENT)
Dept: FAMILY MEDICINE | Facility: CLINIC | Age: 39
End: 2024-01-08
Payer: COMMERCIAL

## 2024-01-08 DIAGNOSIS — F90.0 ATTENTION DEFICIT HYPERACTIVITY DISORDER (ADHD), PREDOMINANTLY INATTENTIVE TYPE: ICD-10-CM

## 2024-01-09 RX ORDER — LISDEXAMFETAMINE DIMESYLATE 60 MG/1
60 CAPSULE ORAL EVERY MORNING
Qty: 30 CAPSULE | Refills: 0 | Status: SHIPPED | OUTPATIENT
Start: 2024-01-09 | End: 2024-03-21

## 2024-01-09 RX ORDER — LISDEXAMFETAMINE DIMESYLATE 60 MG/1
60 CAPSULE ORAL EVERY MORNING
Qty: 30 CAPSULE | Refills: 0 | Status: SHIPPED | OUTPATIENT
Start: 2024-01-09 | End: 2024-02-08

## 2024-02-05 ENCOUNTER — TRANSFERRED RECORDS (OUTPATIENT)
Dept: HEALTH INFORMATION MANAGEMENT | Facility: CLINIC | Age: 39
End: 2024-02-05
Payer: COMMERCIAL

## 2024-02-08 DIAGNOSIS — F90.0 ATTENTION DEFICIT HYPERACTIVITY DISORDER (ADHD), PREDOMINANTLY INATTENTIVE TYPE: ICD-10-CM

## 2024-02-08 RX ORDER — LISDEXAMFETAMINE DIMESYLATE 60 MG/1
60 CAPSULE ORAL EVERY MORNING
Qty: 30 CAPSULE | Refills: 0 | Status: SHIPPED | OUTPATIENT
Start: 2024-02-08 | End: 2024-03-21

## 2024-02-08 RX ORDER — LISDEXAMFETAMINE DIMESYLATE 60 MG/1
60 CAPSULE ORAL EVERY MORNING
Qty: 30 CAPSULE | Refills: 0 | Status: SHIPPED | OUTPATIENT
Start: 2024-03-08 | End: 2024-03-20

## 2024-02-08 RX ORDER — LISDEXAMFETAMINE DIMESYLATE 60 MG/1
60 CAPSULE ORAL EVERY MORNING
Qty: 30 CAPSULE | Refills: 0 | Status: SHIPPED | OUTPATIENT
Start: 2024-04-08

## 2024-03-12 ENCOUNTER — MYC REFILL (OUTPATIENT)
Dept: FAMILY MEDICINE | Facility: CLINIC | Age: 39
End: 2024-03-12
Payer: COMMERCIAL

## 2024-03-12 ENCOUNTER — MYC MEDICAL ADVICE (OUTPATIENT)
Dept: FAMILY MEDICINE | Facility: CLINIC | Age: 39
End: 2024-03-12
Payer: COMMERCIAL

## 2024-03-12 DIAGNOSIS — F90.0 ATTENTION DEFICIT HYPERACTIVITY DISORDER (ADHD), PREDOMINANTLY INATTENTIVE TYPE: ICD-10-CM

## 2024-03-12 RX ORDER — LISDEXAMFETAMINE DIMESYLATE 60 MG/1
60 CAPSULE ORAL EVERY MORNING
Qty: 30 CAPSULE | Refills: 0 | OUTPATIENT
Start: 2024-03-12

## 2024-03-20 ENCOUNTER — MYC MEDICAL ADVICE (OUTPATIENT)
Dept: FAMILY MEDICINE | Facility: CLINIC | Age: 39
End: 2024-03-20
Payer: COMMERCIAL

## 2024-03-20 DIAGNOSIS — F90.0 ATTENTION DEFICIT HYPERACTIVITY DISORDER (ADHD), PREDOMINANTLY INATTENTIVE TYPE: ICD-10-CM

## 2024-03-21 RX ORDER — LISDEXAMFETAMINE DIMESYLATE 60 MG/1
60 CAPSULE ORAL EVERY MORNING
Qty: 30 CAPSULE | Refills: 0 | Status: SHIPPED | OUTPATIENT
Start: 2024-03-21

## 2024-03-25 ENCOUNTER — TELEPHONE (OUTPATIENT)
Dept: FAMILY MEDICINE | Facility: CLINIC | Age: 39
End: 2024-03-25
Payer: COMMERCIAL

## 2024-03-25 DIAGNOSIS — F90.0 ATTENTION DEFICIT HYPERACTIVITY DISORDER (ADHD), PREDOMINANTLY INATTENTIVE TYPE: Primary | ICD-10-CM

## 2024-03-25 RX ORDER — LISDEXAMFETAMINE DIMESYLATE 60 MG/1
60 CAPSULE ORAL EVERY MORNING
Qty: 30 CAPSULE | Refills: 0 | Status: SHIPPED | OUTPATIENT
Start: 2024-03-25 | End: 2024-06-06

## 2024-03-25 NOTE — TELEPHONE ENCOUNTER
Patient would like to start a prior authorization (see encounter from 3/25/24)for brand name Vyvanse since generic is so difficult to obtain. Order pended.    [facility] pharmacy has brand name in stock.    Patient scheduled a phone visit, 3/28/24, to discuss possible dose change or complete medication change. She would prefer not to have to do a phone visit due to cost but is willing.

## 2024-03-25 NOTE — TELEPHONE ENCOUNTER
Patient requesting prior authorization for brand name Vyvanse since it is too difficult to find generic.    lisdexamfetamine (VYVANSE) 60 MG capsule Take 1 capsule (60 mg) by mouth every morning 30 capsule

## 2024-03-27 ENCOUNTER — MYC MEDICAL ADVICE (OUTPATIENT)
Dept: FAMILY MEDICINE | Facility: CLINIC | Age: 39
End: 2024-03-27
Payer: COMMERCIAL

## 2024-03-27 ENCOUNTER — TELEPHONE (OUTPATIENT)
Dept: FAMILY MEDICINE | Facility: CLINIC | Age: 39
End: 2024-03-27
Payer: COMMERCIAL

## 2024-03-27 DIAGNOSIS — F90.0 ATTENTION DEFICIT HYPERACTIVITY DISORDER (ADHD), PREDOMINANTLY INATTENTIVE TYPE: ICD-10-CM

## 2024-03-27 NOTE — TELEPHONE ENCOUNTER
Reason for Call:  Other prescription    Detailed comments: PATIENT IS CALLING ABOUT GENERIC OPTION FOR ADHD MEDICATION     Phone Number Patient can be reached at: Cell number on file:    Telephone Information:   Mobile [phone]       Best Time: PLEASE CALL ABOUT THIS    Can we leave a detailed message on this number? YES    Call taken on 3/27/2024 at 3:26 PM by Emma Kim

## 2024-03-28 RX ORDER — LISDEXAMFETAMINE DIMESYLATE 60 MG/1
60 CAPSULE ORAL EVERY MORNING
Qty: 30 CAPSULE | Refills: 0 | Status: CANCELLED | OUTPATIENT
Start: 2024-03-28

## 2024-03-28 RX ORDER — LISDEXAMFETAMINE DIMESYLATE 60 MG/1
60 CAPSULE ORAL EVERY MORNING
Qty: 90 CAPSULE | Refills: 0 | Status: SHIPPED | OUTPATIENT
Start: 2024-03-28

## 2024-03-28 NOTE — TELEPHONE ENCOUNTER
Starr- see [employer] message below.  T'd up with note for brand name due to shortage.  Her appt is at 10 am.  Her last med check was 11/15/23 so should reschedule if cancels today's visit.  Please advise.  Natalie Singer RN

## 2024-03-28 NOTE — TELEPHONE ENCOUNTER
Prior Authorization Not Needed per Insurance    Medication: LISDEXAMFETAMINE DIMESYLATE 60 MG PO CAPS  Insurance Company: [employer] - Phone [phone] Fax [phone]  Expected CoPay: $    Pharmacy Filling the Rx: [facility] [place], [place] - [address] [facility]  Pharmacy Notified: Yes - Pharmacy process as brand  Patient Notified: Yes - Pharmacy already spoken with patient regarding copay amount. Patient is okay with copay

## 2024-06-05 ASSESSMENT — ANXIETY QUESTIONNAIRES
GAD7 TOTAL SCORE: 0
1. FEELING NERVOUS, ANXIOUS, OR ON EDGE: NOT AT ALL
3. WORRYING TOO MUCH ABOUT DIFFERENT THINGS: NOT AT ALL
7. FEELING AFRAID AS IF SOMETHING AWFUL MIGHT HAPPEN: NOT AT ALL
8. IF YOU CHECKED OFF ANY PROBLEMS, HOW DIFFICULT HAVE THESE MADE IT FOR YOU TO DO YOUR WORK, TAKE CARE OF THINGS AT HOME, OR GET ALONG WITH OTHER PEOPLE?: NOT DIFFICULT AT ALL
5. BEING SO RESTLESS THAT IT IS HARD TO SIT STILL: NOT AT ALL
7. FEELING AFRAID AS IF SOMETHING AWFUL MIGHT HAPPEN: NOT AT ALL
2. NOT BEING ABLE TO STOP OR CONTROL WORRYING: NOT AT ALL
6. BECOMING EASILY ANNOYED OR IRRITABLE: NOT AT ALL
IF YOU CHECKED OFF ANY PROBLEMS ON THIS QUESTIONNAIRE, HOW DIFFICULT HAVE THESE PROBLEMS MADE IT FOR YOU TO DO YOUR WORK, TAKE CARE OF THINGS AT HOME, OR GET ALONG WITH OTHER PEOPLE: NOT DIFFICULT AT ALL
GAD7 TOTAL SCORE: 0
4. TROUBLE RELAXING: NOT AT ALL
GAD7 TOTAL SCORE: 0

## 2024-06-06 ENCOUNTER — VIRTUAL VISIT (OUTPATIENT)
Dept: FAMILY MEDICINE | Facility: CLINIC | Age: 39
End: 2024-06-06
Payer: COMMERCIAL

## 2024-06-06 DIAGNOSIS — F33.42 RECURRENT MAJOR DEPRESSION IN COMPLETE REMISSION (H): ICD-10-CM

## 2024-06-06 DIAGNOSIS — F90.0 ATTENTION DEFICIT HYPERACTIVITY DISORDER (ADHD), PREDOMINANTLY INATTENTIVE TYPE: ICD-10-CM

## 2024-06-06 PROCEDURE — 99214 OFFICE O/P EST MOD 30 MIN: CPT | Mod: 95 | Performed by: PHYSICIAN ASSISTANT

## 2024-06-06 RX ORDER — LISDEXAMFETAMINE DIMESYLATE 60 MG/1
60 CAPSULE ORAL EVERY MORNING
Qty: 30 CAPSULE | Refills: 0 | Status: SHIPPED | OUTPATIENT
Start: 2024-07-29

## 2024-06-06 RX ORDER — LISDEXAMFETAMINE DIMESYLATE 60 MG/1
60 CAPSULE ORAL EVERY MORNING
Qty: 30 CAPSULE | Refills: 0 | Status: SHIPPED | OUTPATIENT
Start: 2024-06-29

## 2024-06-06 RX ORDER — LISDEXAMFETAMINE DIMESYLATE 60 MG/1
60 CAPSULE ORAL EVERY MORNING
Qty: 30 CAPSULE | Refills: 0 | Status: SHIPPED | OUTPATIENT
Start: 2024-08-29

## 2024-06-06 RX ORDER — LISDEXAMFETAMINE DIMESYLATE 60 MG/1
60 CAPSULE ORAL EVERY MORNING
Qty: 30 CAPSULE | Refills: 0 | Status: SHIPPED | OUTPATIENT
Start: 2024-07-29 | End: 2024-06-06

## 2024-06-06 NOTE — PROGRESS NOTES
Seema is a 39 year old who is being evaluated via a billable video visit.    How would you like to obtain your AVS? MyChart  If the video visit is dropped, the invitation should be resent by: Text to cell phone: [phone]  Will anyone else be joining your video visit? No      Assessment & Plan     (F90.0) Attention deficit hyperactivity disorder (ADHD), predominantly inattentive type  Comment:   Plan: symptoms well tolerated with Vyvase 60 mg  Will  paper copies @  due to shortage.  Understands if paper Rx lost, cannot replace. Follow-up in 6 months.       (F33.42) Recurrent major depression in complete remission (H24)  Comment:   Plan: stable. Remains in remission                 Subjective   Seema is a 39 year old, presenting for the following health issues:  A.D.H.D, Anxiety, and Depression        6/6/2024     7:52 AM   Additional Questions   Roomed by AT         A.D.H.D    History of Present Illness       Reason for visit:  Check-in regarding ADHD medications and refills    She eats 4 or more servings of fruits and vegetables daily.She consumes 0 sweetened beverage(s) daily.She exercises with enough effort to increase her heart rate 20 to 29 minutes per day.  She exercises with enough effort to increase her heart rate 3 or less days per week.   She is taking medications regularly.       ADHD Follow-Up (Adult)  Concerns: getting medication filled. Due to shortage   Changes since last visit: None  Taking controlled (daily) medications as prescribed: Yes  Sleep: no problems  Adult ADHD Self-Reporting form given to patient?:  Yes  Currently in counseling: Yes    Medication Benefits:   Controlled symptoms: Attention span, Distractability, and Finishing tasks  Uncontrolled symptoms:  None    Medication Side Effects:  Reports:  none  Sleep Problems? no  ++++++++++++++++++++++++++++++++++++++++++++++++    Employer Concerns/Feedback: None  Coworker Concerns:   None  Home/Family Concerns:  "None      Depression and Anxiety   How are you doing with your depression since your last visit? No change  How are you doing with your anxiety since your last visit?  No change  Are you having other symptoms that might be associated with depression or anxiety? No  Have you had a significant life event? Housing Concerns   Do you have any concerns with your use of alcohol or other drugs? No    Social History     Tobacco Use    Smoking status: Never    Smokeless tobacco: Never   Vaping Use    Vaping status: Never Used   Substance Use Topics    Alcohol use: No     Comment: One drink/week (prior to pregnancy)    Drug use: No         11/15/2023     7:18 AM 11/15/2023     7:29 AM 6/5/2024     2:59 PM   PHQ   PHQ-9 Total Score 1 1 0   Q9: Thoughts of better off dead/self-harm past 2 weeks Not at all Not at all Not at all         2/23/2019     8:03 AM 5/1/2023     9:21 PM 6/5/2024     3:00 PM   RHETT-7 SCORE   Total Score 2 (minimal anxiety) 3 (minimal anxiety) 0 (minimal anxiety)   Total Score 2 3 0         [place]      Review of Systems  Constitutional, HEENT, cardiovascular, pulmonary, gi and gu systems are negative, except as otherwise noted.      Objective    Vitals - Patient Reported  Height (Patient Reported): 165.1 cm (5' 5\")  Pain Score: No Pain (0)      Vitals:  No vitals were obtained today due to virtual visit.    Physical Exam   GENERAL: alert and no distress  EYES: Eyes grossly normal to inspection.  No discharge or erythema, or obvious scleral/conjunctival abnormalities.  RESP: No audible wheeze, cough, or visible cyanosis.    SKIN: Visible skin clear. No significant rash, abnormal pigmentation or lesions.  NEURO: Cranial nerves grossly intact.  Mentation and speech appropriate for age.  PSYCH: Appropriate affect, tone, and pace of words          Video-Visit Details    Type of service:  Video Visit   Originating Location (pt. Location): Home    Distant Location (provider location):  Off-site  Platform used for Video " Visit: Neville  Signed Electronically by: Starr Gutierres PA-C

## 2024-09-05 ENCOUNTER — TELEPHONE (OUTPATIENT)
Dept: PHARMACY | Facility: CLINIC | Age: 39
End: 2024-09-05
Payer: COMMERCIAL

## 2024-09-05 NOTE — TELEPHONE ENCOUNTER
We have attempted to contact this patient three times to set up a MTM follow up appointment and were unsuccessful. Contact attempts were made via [employer] x2 and letter. We will no longer continue to contact this patient to schedule a visit at this time. Please refer back to MTM if you believe this patient would continue to benefit from our services.     Thank you!    Farida Cooper, PharmD  Medication Therapy Management Pharmacist  Voicemail: [phone]

## 2024-09-06 ENCOUNTER — TRANSFERRED RECORDS (OUTPATIENT)
Dept: HEALTH INFORMATION MANAGEMENT | Facility: CLINIC | Age: 39
End: 2024-09-06
Payer: COMMERCIAL

## 2024-09-11 ENCOUNTER — TELEPHONE (OUTPATIENT)
Dept: FAMILY MEDICINE | Facility: CLINIC | Age: 39
End: 2024-09-11
Payer: COMMERCIAL

## 2024-09-11 NOTE — TELEPHONE ENCOUNTER
Patient calling and wondering if she can get 3 month's printed RX's for Vyvanse.  Discussed she is not due for refill yet.  Discussed scheduling establish care with primary care provider.  Discussed covering providers will cover til establish care visit for refills.  Natalie Singer RN

## 2025-02-20 ENCOUNTER — E-VISIT (OUTPATIENT)
Dept: FAMILY MEDICINE | Facility: CLINIC | Age: 40
End: 2025-02-20
Payer: COMMERCIAL

## 2025-02-20 DIAGNOSIS — R69 CHRONIC DISEASE: Primary | ICD-10-CM

## 2025-02-20 PROCEDURE — 99207 PR NON-BILLABLE SERV PER CHARTING: CPT

## 2025-02-24 ASSESSMENT — ANXIETY QUESTIONNAIRES
IF YOU CHECKED OFF ANY PROBLEMS ON THIS QUESTIONNAIRE, HOW DIFFICULT HAVE THESE PROBLEMS MADE IT FOR YOU TO DO YOUR WORK, TAKE CARE OF THINGS AT HOME, OR GET ALONG WITH OTHER PEOPLE: NOT DIFFICULT AT ALL
GAD7 TOTAL SCORE: 1
3. WORRYING TOO MUCH ABOUT DIFFERENT THINGS: NOT AT ALL
4. TROUBLE RELAXING: SEVERAL DAYS
1. FEELING NERVOUS, ANXIOUS, OR ON EDGE: NOT AT ALL
GAD7 TOTAL SCORE: 1
2. NOT BEING ABLE TO STOP OR CONTROL WORRYING: NOT AT ALL
6. BECOMING EASILY ANNOYED OR IRRITABLE: NOT AT ALL
7. FEELING AFRAID AS IF SOMETHING AWFUL MIGHT HAPPEN: NOT AT ALL
7. FEELING AFRAID AS IF SOMETHING AWFUL MIGHT HAPPEN: NOT AT ALL
8. IF YOU CHECKED OFF ANY PROBLEMS, HOW DIFFICULT HAVE THESE MADE IT FOR YOU TO DO YOUR WORK, TAKE CARE OF THINGS AT HOME, OR GET ALONG WITH OTHER PEOPLE?: NOT DIFFICULT AT ALL
5. BEING SO RESTLESS THAT IT IS HARD TO SIT STILL: NOT AT ALL
GAD7 TOTAL SCORE: 1

## 2025-02-24 ASSESSMENT — PATIENT HEALTH QUESTIONNAIRE - PHQ9
SUM OF ALL RESPONSES TO PHQ QUESTIONS 1-9: 0
SUM OF ALL RESPONSES TO PHQ QUESTIONS 1-9: 0

## 2025-02-24 NOTE — TELEPHONE ENCOUNTER
Provider E-Visit time total (minutes): Referred to in person/virtual visit.  Less than 5 minutes.

## 2025-02-24 NOTE — PATIENT INSTRUCTIONS
Dear Seema,    I can refill your Vyvanse until May and then I will need you to be seen either virtually or in person to have refilled. I hope you are enjoying the warmer weather.     You will not be charged for this eVisit. Thank you for trusting us with your care.     ROSANNE Schmitt CNP

## 2025-03-15 ENCOUNTER — MYC MEDICAL ADVICE (OUTPATIENT)
Dept: FAMILY MEDICINE | Facility: CLINIC | Age: 40
End: 2025-03-15
Payer: COMMERCIAL

## 2025-03-17 NOTE — TELEPHONE ENCOUNTER
Please see mychart message from patient. Patient asking about IUD.    Per chart review, patient had Mirena IUD inserted 6/27/2019 by Dr. Jones . Per that note: to remove on or before 6/27/2024.    Ana María Malik, RN, BSN, PHN  [facility]

## 2025-03-24 ENCOUNTER — TRANSFERRED RECORDS (OUTPATIENT)
Dept: HEALTH INFORMATION MANAGEMENT | Facility: CLINIC | Age: 40
End: 2025-03-24
Payer: COMMERCIAL

## 2025-05-08 ENCOUNTER — VIRTUAL VISIT (OUTPATIENT)
Dept: FAMILY MEDICINE | Facility: CLINIC | Age: 40
End: 2025-05-08
Payer: COMMERCIAL

## 2025-05-08 DIAGNOSIS — F90.0 ATTENTION DEFICIT HYPERACTIVITY DISORDER (ADHD), PREDOMINANTLY INATTENTIVE TYPE: Primary | ICD-10-CM

## 2025-05-08 DIAGNOSIS — F41.1 GAD (GENERALIZED ANXIETY DISORDER): ICD-10-CM

## 2025-05-08 RX ORDER — DEXTROAMPHETAMINE SACCHARATE, AMPHETAMINE ASPARTATE, DEXTROAMPHETAMINE SULFATE AND AMPHETAMINE SULFATE 1.25; 1.25; 1.25; 1.25 MG/1; MG/1; MG/1; MG/1
5-10 TABLET ORAL DAILY
Qty: 60 TABLET | Refills: 0 | Status: SHIPPED | OUTPATIENT
Start: 2025-05-08

## 2025-05-08 RX ORDER — LISDEXAMFETAMINE DIMESYLATE 60 MG/1
60 CAPSULE ORAL DAILY
Qty: 30 CAPSULE | Refills: 0 | Status: SHIPPED | OUTPATIENT
Start: 2025-05-08 | End: 2025-05-08

## 2025-05-08 RX ORDER — LISDEXAMFETAMINE DIMESYLATE 60 MG/1
60 CAPSULE ORAL DAILY
Qty: 30 CAPSULE | Refills: 0 | Status: SHIPPED | OUTPATIENT
Start: 2025-07-07 | End: 2025-05-08

## 2025-05-08 RX ORDER — LISDEXAMFETAMINE DIMESYLATE 60 MG/1
60 CAPSULE ORAL DAILY
Qty: 30 CAPSULE | Refills: 0 | Status: SHIPPED | OUTPATIENT
Start: 2025-07-07

## 2025-05-08 RX ORDER — LISDEXAMFETAMINE DIMESYLATE 60 MG/1
60 CAPSULE ORAL DAILY
Qty: 30 CAPSULE | Refills: 0 | Status: SHIPPED | OUTPATIENT
Start: 2025-05-08

## 2025-05-08 RX ORDER — LISDEXAMFETAMINE DIMESYLATE 60 MG/1
60 CAPSULE ORAL DAILY
Qty: 30 CAPSULE | Refills: 0 | Status: SHIPPED | OUTPATIENT
Start: 2025-06-07

## 2025-05-08 RX ORDER — LISDEXAMFETAMINE DIMESYLATE 60 MG/1
60 CAPSULE ORAL DAILY
Qty: 30 CAPSULE | Refills: 0 | Status: SHIPPED | OUTPATIENT
Start: 2025-06-07 | End: 2025-05-08

## 2025-05-08 RX ORDER — DEXTROAMPHETAMINE SACCHARATE, AMPHETAMINE ASPARTATE, DEXTROAMPHETAMINE SULFATE AND AMPHETAMINE SULFATE 1.25; 1.25; 1.25; 1.25 MG/1; MG/1; MG/1; MG/1
5-10 TABLET ORAL DAILY
Qty: 60 TABLET | Refills: 0 | Status: SHIPPED | OUTPATIENT
Start: 2025-05-08 | End: 2025-05-08

## 2025-05-08 NOTE — PROGRESS NOTES
"ADHD Follow-Up (Adult)  Concerns: taking it everyday / taking it daily helps with becoming feisty   Changes since last visit: Stable and up a dose.  Taking controlled (daily) medications as prescribed: Yes  Sleep: trouble falling asleep and trouble awaking in the morning  Adult ADHD Self-Reporting form given to patient?:    Currently in counseling:     Medication Benefits:   Controlled symptoms: Hyperactivity - motor restlessness, Attention span, Distractability, and Finishing tasks  Uncontrolled symptoms:  Distractability, Finishing tasks, Frustration tolerance, and Accepting limits    Medication Side Effects:  Reports:  drowsiness, \"zombie\" effect, and is it because of antidepressant or       Seema is a 39 year old who is being evaluated via a billable video visit.    How would you like to obtain your AVS? MyChart  If the video visit is dropped, the invitation should be resent by: Text to cell phone: [phone]  Will anyone else be joining your video visit? No      Assessment & Plan     Attention deficit hyperactivity disorder (ADHD), predominantly inattentive type  - ADHD symptoms may overlap with anxiety, making it difficult to distinguish between the two. Current Vyvanse dose wears off too soon.  - Continue Vyvanse 60 mg daily. Add Adderall immediate release 5-10 mg in the afternoon, with instructions to take between 12:00 PM and 2:00 PM, adjusting as needed. Follow-up in one month to evaluate effectiveness and adjust treatment if necessary.    RHETT (generalized anxiety disorder)  - Potential overlap of symptoms with ADHD.   - Patient does not want to make any adjustments to her Zoloft or Buspar at this time. Will see how she responds to ADHD med adjustment.     The longitudinal plan of care for the diagnosis(es)/condition(s) as documented were addressed during this visit. Due to the added complexity in care, I will continue to support Seema in the subsequent management and with ongoing continuity of " "care.    Subjective   Seema is a 39 year old, presenting for the following health issues:  A.D.H.D (Stable yet medication maybe a better option)        5/8/2025    10:58 AM   Additional Questions   Roomed by maurice MARTINEZ    History of Present Illness       Reason for visit:  ADHD check-in    She eats 4 or more servings of fruits and vegetables daily.She consumes 0 sweetened beverage(s) daily.She exercises with enough effort to increase her heart rate 9 or less minutes per day.  She exercises with enough effort to increase her heart rate 3 or less days per week.   She is taking medications regularly.        History of Present Illness-  Seema Nielson, 39 years old, reports uncertainty about the effectiveness of her current Vyvanse medication, although she acknowledges it is better than not taking it. She has been feeling unlike herself lately, which she attributes to possible stress from her nephew's recent birth with severe palate and cleft, as well as general life ups and downs. She expresses concerns about whether her depression and anxiety medications are optimally managed, noting that adjustments take time to evaluate.    She takes Vyvanse daily, finding it helpful for focus in her roles as a mother and in daily life, but feels it may wear off too soon. She has been feeling more tired recently. Seema describes a busy family schedule in May, with all family members having birthdays within three weeks, adding to her stress. She has been on anxiety medication since high school and wonders if long-term use may affect its efficacy. Despite these concerns, she states she is generally fine and not depressed.       Objective    Vitals - Patient Reported  Weight (Patient Reported): 63.5 kg (140 lb)  Height (Patient Reported): 165.1 cm (5' 5\")  BMI (Based on Pt Reported Ht/Wt): 23.3  Pain Score: No Pain (0)        Physical Exam   GENERAL: alert and no distress  EYES: Eyes grossly normal to " inspection.  No discharge or erythema, or obvious scleral/conjunctival abnormalities.  RESP: No audible wheeze, cough, or visible cyanosis.    SKIN: Visible skin clear. No significant rash, abnormal pigmentation or lesions.  NEURO: Cranial nerves grossly intact.  Mentation and speech appropriate for age.  PSYCH: Appropriate affect, tone, and pace of words        Video-Visit Details    Type of service:  Video Visit   Originating Location (pt. Location): Home    Distant Location (provider location):  On-site  Platform used for Video Visit: Neville  Signed Electronically by: ROSANNE Schmitt CNP

## 2025-05-31 ENCOUNTER — HEALTH MAINTENANCE LETTER (OUTPATIENT)
Age: 40
End: 2025-05-31

## 2025-06-02 ENCOUNTER — MYC MEDICAL ADVICE (OUTPATIENT)
Dept: FAMILY MEDICINE | Facility: CLINIC | Age: 40
End: 2025-06-02

## 2025-06-02 DIAGNOSIS — F90.0 ATTENTION DEFICIT HYPERACTIVITY DISORDER (ADHD), PREDOMINANTLY INATTENTIVE TYPE: ICD-10-CM

## 2025-06-03 NOTE — TELEPHONE ENCOUNTER
Michela- see [employer] message below.  Please advise.  Natalie Singer, RN    5/8/2025  [facility]    - Continue Vyvanse 60 mg daily. Add Adderall immediate release 5-10 mg in the afternoon, with instructions to take between 12:00 PM and 2:00 PM, adjusting as needed. Follow-up in one month to evaluate effectiveness and adjust treatment if necessary.     Signed Electronically by: ROSANNE Schmitt CNP

## 2025-06-04 RX ORDER — DEXTROAMPHETAMINE SACCHARATE, AMPHETAMINE ASPARTATE, DEXTROAMPHETAMINE SULFATE AND AMPHETAMINE SULFATE 1.25; 1.25; 1.25; 1.25 MG/1; MG/1; MG/1; MG/1
5-10 TABLET ORAL DAILY
Qty: 60 TABLET | Refills: 0 | Status: SHIPPED | OUTPATIENT
Start: 2025-06-04

## 2025-06-04 NOTE — TELEPHONE ENCOUNTER
"Michela Soni, ROSANNE CNP- See below. Please review pended prescription and order, if appropriate.     Called and spoke with pt,     She states she is alternating between 5 mg and 10 mg depending on the day. If she needs to go with one or the other, she would like 5 mg. She says, \"thank you\" for refilling the medication.     Rosibel NORMAN RN   Clinic RN  [facility]    "

## 2025-06-04 NOTE — TELEPHONE ENCOUNTER
I would be happy to send in refill and she can follow-up this fall. Is she taking 5 or 10 mg of the adderall immediate release?

## 2025-07-03 DIAGNOSIS — F90.0 ATTENTION DEFICIT HYPERACTIVITY DISORDER (ADHD), PREDOMINANTLY INATTENTIVE TYPE: ICD-10-CM

## 2025-07-03 DIAGNOSIS — F41.1 GAD (GENERALIZED ANXIETY DISORDER): Primary | ICD-10-CM

## 2025-07-03 DIAGNOSIS — F33.42 RECURRENT MAJOR DEPRESSION IN COMPLETE REMISSION: ICD-10-CM

## 2025-07-03 RX ORDER — BUSPIRONE HYDROCHLORIDE 30 MG/1
30 TABLET ORAL 2 TIMES DAILY
Qty: 10 TABLET | Refills: 0 | Status: SHIPPED | OUTPATIENT
Start: 2025-07-03 | End: 2025-07-08

## 2025-07-03 RX ORDER — LISDEXAMFETAMINE DIMESYLATE 60 MG/1
60 CAPSULE ORAL EVERY MORNING
Qty: 5 CAPSULE | Refills: 0 | Status: SHIPPED | OUTPATIENT
Start: 2025-07-03 | End: 2025-07-08

## 2025-07-03 RX ORDER — DEXTROAMPHETAMINE SACCHARATE, AMPHETAMINE ASPARTATE, DEXTROAMPHETAMINE SULFATE AND AMPHETAMINE SULFATE 1.25; 1.25; 1.25; 1.25 MG/1; MG/1; MG/1; MG/1
TABLET ORAL
Qty: 10 TABLET | Refills: 0 | Status: SHIPPED | OUTPATIENT
Start: 2025-07-03 | End: 2025-07-08

## 2025-07-03 RX ORDER — SERTRALINE HYDROCHLORIDE 100 MG/1
150 TABLET, FILM COATED ORAL DAILY
Qty: 8 TABLET | Refills: 0 | Status: SHIPPED | OUTPATIENT
Start: 2025-07-03 | End: 2025-07-08

## 2025-07-03 NOTE — TELEPHONE ENCOUNTER
Huddled with POD regarding request, who advised to pend and route encounter to address.    YESSENIA OdonnellN RN  [facility]

## 2025-07-03 NOTE — TELEPHONE ENCOUNTER
Patient calling to request short term supply of medications. She is in [place] and forgot her medications.    Patient requesting short term supply of:    Zoloft  Buspar  Adderall  Vyvanse  Propranolol (prescribed by neurology and advised to reach out to them)    Pended a 5 days supply. Please review. Patient aware PCP is not in clinic. She did state she could go without the Adderall and Vyvanse if covering provider is not comfortable signing for those.    Pended. Please advise.  Thanks!  Deisi GRIFFITH RN, BSN  Clinic RN  [facility]

## 2025-08-27 ENCOUNTER — OFFICE VISIT (OUTPATIENT)
Dept: FAMILY MEDICINE | Facility: CLINIC | Age: 40
End: 2025-08-27
Payer: COMMERCIAL

## 2025-08-27 VITALS
HEIGHT: 65 IN | BODY MASS INDEX: 21.98 KG/M2 | OXYGEN SATURATION: 100 % | SYSTOLIC BLOOD PRESSURE: 120 MMHG | RESPIRATION RATE: 20 BRPM | HEART RATE: 68 BPM | TEMPERATURE: 98.6 F | DIASTOLIC BLOOD PRESSURE: 81 MMHG | WEIGHT: 131.9 LBS

## 2025-08-27 DIAGNOSIS — Z12.31 VISIT FOR SCREENING MAMMOGRAM: Primary | ICD-10-CM

## 2025-08-27 DIAGNOSIS — L02.412 CUTANEOUS ABSCESS OF LEFT AXILLA: ICD-10-CM

## 2025-08-27 PROCEDURE — 87070 CULTURE OTHR SPECIMN AEROBIC: CPT | Performed by: FAMILY MEDICINE

## 2025-08-27 RX ORDER — SULFAMETHOXAZOLE AND TRIMETHOPRIM 800; 160 MG/1; MG/1
1 TABLET ORAL
COMMUNITY
Start: 2025-08-23 | End: 2025-09-02

## 2025-08-27 RX ORDER — FLUCONAZOLE 150 MG/1
150 TABLET ORAL ONCE
Qty: 1 TABLET | Refills: 0 | Status: SHIPPED | OUTPATIENT
Start: 2025-08-27 | End: 2025-08-27

## 2025-08-27 ASSESSMENT — PATIENT HEALTH QUESTIONNAIRE - PHQ9
10. IF YOU CHECKED OFF ANY PROBLEMS, HOW DIFFICULT HAVE THESE PROBLEMS MADE IT FOR YOU TO DO YOUR WORK, TAKE CARE OF THINGS AT HOME, OR GET ALONG WITH OTHER PEOPLE: NOT DIFFICULT AT ALL
SUM OF ALL RESPONSES TO PHQ QUESTIONS 1-9: 2
SUM OF ALL RESPONSES TO PHQ QUESTIONS 1-9: 2

## 2025-08-27 ASSESSMENT — PAIN SCALES - GENERAL: PAINLEVEL_OUTOF10: MODERATE PAIN (5)

## 2025-08-28 LAB
BACTERIA FLD CULT: ABNORMAL
BACTERIA FLD CULT: ABNORMAL

## 2025-09-04 ENCOUNTER — MYC REFILL (OUTPATIENT)
Dept: FAMILY MEDICINE | Facility: CLINIC | Age: 40
End: 2025-09-04
Payer: COMMERCIAL

## 2025-09-04 DIAGNOSIS — F90.0 ATTENTION DEFICIT HYPERACTIVITY DISORDER (ADHD), PREDOMINANTLY INATTENTIVE TYPE: ICD-10-CM

## 2025-09-04 RX ORDER — LISDEXAMFETAMINE DIMESYLATE 60 MG/1
60 CAPSULE ORAL DAILY
Qty: 30 CAPSULE | Refills: 0 | Status: SHIPPED | OUTPATIENT
Start: 2025-09-04

## 2025-09-04 RX ORDER — DEXTROAMPHETAMINE SACCHARATE, AMPHETAMINE ASPARTATE, DEXTROAMPHETAMINE SULFATE AND AMPHETAMINE SULFATE 1.25; 1.25; 1.25; 1.25 MG/1; MG/1; MG/1; MG/1
5-10 TABLET ORAL DAILY
Qty: 60 TABLET | Refills: 0 | Status: SHIPPED | OUTPATIENT
Start: 2025-09-04

## (undated) DEVICE — BLADE KNIFE SURG 15 371115

## (undated) DEVICE — SU MONOCRYL 4-0 PS-2 18" UND Y496G

## (undated) DEVICE — DRAPE C-ARM 60X42" 1013

## (undated) DEVICE — KIT ACCESSORY PERIPHERAL LEAD INTR 3550-18

## (undated) DEVICE — GLOVE PROTEXIS MICRO 7.5  2D73PM75

## (undated) DEVICE — LINEN FULL SHEET 5511

## (undated) DEVICE — DRAPE BREAST/CHEST 29420

## (undated) DEVICE — PREP DURAPREP 26ML APL 8630

## (undated) DEVICE — ADH SKIN CLOSURE PREMIERPRO EXOFIN 1.0ML 3470

## (undated) DEVICE — LINEN DRAPE 54X72" 5467

## (undated) DEVICE — EXTERNAL NEUROSTIMULATOR

## (undated) DEVICE — GLOVE PROTEXIS W/NEU-THERA 7.0  2D73TE70

## (undated) DEVICE — BAG CLEAR TRASH 1.3M 39X33" P4040C

## (undated) DEVICE — DRAPE SLEEVE 599

## (undated) DEVICE — TUBING SUCTION 12"X1/4" N612

## (undated) DEVICE — PACK MINOR CUSTOM RIDGES SBA32RMRMA

## (undated) DEVICE — LINEN HALF SHEET 5512

## (undated) DEVICE — LINEN TOWEL PACK X10 5473

## (undated) DEVICE — DRAPE IOBAN INCISE 23X17" 6650EZ

## (undated) DEVICE — Device

## (undated) DEVICE — GOWN XLG DISP 9545

## (undated) DEVICE — SUCTION TIP YANKAUER W/O VENT K86

## (undated) DEVICE — SYR BULB IRRIG 50ML LATEX FREE 0035280

## (undated) DEVICE — COVER PROBE ULTRASOUND 3D W/GEL 5X96" LF 20-P3D596

## (undated) DEVICE — SU VICRYL 3-0 CT-2 27" UND J232H

## (undated) DEVICE — SU VICRYL 3-0 SH 27" J316H

## (undated) RX ORDER — PROPOFOL 10 MG/ML
INJECTION, EMULSION INTRAVENOUS
Status: DISPENSED
Start: 2019-12-09

## (undated) RX ORDER — ONDANSETRON 2 MG/ML
INJECTION INTRAMUSCULAR; INTRAVENOUS
Status: DISPENSED
Start: 2019-12-09

## (undated) RX ORDER — FENTANYL CITRATE 50 UG/ML
INJECTION, SOLUTION INTRAMUSCULAR; INTRAVENOUS
Status: DISPENSED
Start: 2019-12-09

## (undated) RX ORDER — LIDOCAINE HYDROCHLORIDE 10 MG/ML
INJECTION, SOLUTION EPIDURAL; INFILTRATION; INTRACAUDAL; PERINEURAL
Status: DISPENSED
Start: 2019-12-09

## (undated) RX ORDER — CEFAZOLIN SODIUM 2 G/100ML
INJECTION, SOLUTION INTRAVENOUS
Status: DISPENSED
Start: 2019-12-09

## (undated) RX ORDER — DEXAMETHASONE SODIUM PHOSPHATE 4 MG/ML
INJECTION, SOLUTION INTRA-ARTICULAR; INTRALESIONAL; INTRAMUSCULAR; INTRAVENOUS; SOFT TISSUE
Status: DISPENSED
Start: 2019-12-09

## (undated) RX ORDER — VANCOMYCIN HYDROCHLORIDE 1 G/20ML
INJECTION, POWDER, LYOPHILIZED, FOR SOLUTION INTRAVENOUS
Status: DISPENSED
Start: 2019-12-09